# Patient Record
Sex: FEMALE | Race: BLACK OR AFRICAN AMERICAN | NOT HISPANIC OR LATINO | Employment: FULL TIME | URBAN - METROPOLITAN AREA
[De-identification: names, ages, dates, MRNs, and addresses within clinical notes are randomized per-mention and may not be internally consistent; named-entity substitution may affect disease eponyms.]

---

## 2020-01-17 ENCOUNTER — TELEPHONE (OUTPATIENT)
Dept: ENDOCRINOLOGY | Facility: CLINIC | Age: 55
End: 2020-01-17

## 2020-01-17 NOTE — TELEPHONE ENCOUNTER
----- Message from Janki Souza LPN sent at 1/17/2020  3:06 PM CST -----  Contact: Self  Message was sent to Dr. Strange's staff but pt is specifically asking for [place].  Please call her to schedule.    ----- Message -----  From: Miladis Jack  Sent: 1/17/2020   2:27 PM CST  To: Alexandr Powell Staff (Endo)    Pt is calling to be scheduled for an appt for possible diabetes & underactive thyroid; however,  was unable to find availability due to an exhausted template.    She can be reached at [phone].    Thank you.

## 2020-09-30 PROBLEM — S52.021A CLOSED FRACTURE OF RIGHT OLECRANON PROCESS: Status: RESOLVED | Noted: 2020-09-30 | Resolved: 2020-09-30

## 2020-09-30 PROBLEM — S52.021A CLOSED FRACTURE OF RIGHT OLECRANON PROCESS: Status: ACTIVE | Noted: 2020-09-30

## 2021-03-05 ENCOUNTER — IMMUNIZATION (OUTPATIENT)
Dept: PRIMARY CARE CLINIC | Facility: CLINIC | Age: 56
End: 2021-03-05
Payer: COMMERCIAL

## 2021-03-05 DIAGNOSIS — Z23 NEED FOR VACCINATION: Primary | ICD-10-CM

## 2021-03-05 PROCEDURE — 0031A PR IMMUNIZ ADMIN, SARS-COV-2 COVID-19 VACC, 5X10VP/0.5ML: ICD-10-PCS | Mod: CV19,S$GLB,, | Performed by: INTERNAL MEDICINE

## 2021-03-05 PROCEDURE — 91303 PR SARSCOV2 VAC AD26 .5ML IM: CPT | Mod: S$GLB,,, | Performed by: INTERNAL MEDICINE

## 2021-03-05 PROCEDURE — 91303 PR SARSCOV2 VAC AD26 .5ML IM: ICD-10-PCS | Mod: S$GLB,,, | Performed by: INTERNAL MEDICINE

## 2021-03-05 PROCEDURE — 0031A PR IMMUNIZ ADMIN, SARS-COV-2 COVID-19 VACC, 5X10VP/0.5ML: CPT | Mod: CV19,S$GLB,, | Performed by: INTERNAL MEDICINE

## 2021-05-07 ENCOUNTER — LAB VISIT (OUTPATIENT)
Dept: INTERNAL MEDICINE | Facility: CLINIC | Age: 56
End: 2021-05-07
Payer: COMMERCIAL

## 2021-05-07 DIAGNOSIS — Z01.818 PRE-OP TESTING: ICD-10-CM

## 2021-05-07 LAB — SARS-COV-2 RNA RESP QL NAA+PROBE: NOT DETECTED

## 2021-05-07 PROCEDURE — U0003 INFECTIOUS AGENT DETECTION BY NUCLEIC ACID (DNA OR RNA); SEVERE ACUTE RESPIRATORY SYNDROME CORONAVIRUS 2 (SARS-COV-2) (CORONAVIRUS DISEASE [COVID-19]), AMPLIFIED PROBE TECHNIQUE, MAKING USE OF HIGH THROUGHPUT TECHNOLOGIES AS DESCRIBED BY CMS-2020-01-R: HCPCS | Performed by: ORTHOPAEDIC SURGERY

## 2021-05-07 PROCEDURE — U0005 INFEC AGEN DETEC AMPLI PROBE: HCPCS | Performed by: ORTHOPAEDIC SURGERY

## 2021-05-10 PROBLEM — Z01.818 PRE-OP TESTING: Status: ACTIVE | Noted: 2021-05-10

## 2021-05-10 PROBLEM — S52.021D: Status: ACTIVE | Noted: 2020-09-30

## 2021-05-10 PROBLEM — S52.021D: Status: RESOLVED | Noted: 2020-09-30 | Resolved: 2021-05-10

## 2022-04-14 ENCOUNTER — OFFICE VISIT (OUTPATIENT)
Dept: ENDOCRINOLOGY | Facility: CLINIC | Age: 57
End: 2022-04-14
Payer: COMMERCIAL

## 2022-04-14 VITALS
TEMPERATURE: 99 F | WEIGHT: 186 LBS | BODY MASS INDEX: 35.14 KG/M2 | SYSTOLIC BLOOD PRESSURE: 163 MMHG | HEART RATE: 73 BPM | DIASTOLIC BLOOD PRESSURE: 96 MMHG

## 2022-04-14 DIAGNOSIS — I10 HYPERTENSION, UNSPECIFIED TYPE: ICD-10-CM

## 2022-04-14 DIAGNOSIS — E11.9 CONTROLLED TYPE 2 DIABETES MELLITUS WITHOUT COMPLICATION, WITHOUT LONG-TERM CURRENT USE OF INSULIN: Primary | ICD-10-CM

## 2022-04-14 DIAGNOSIS — E04.9 GOITER: ICD-10-CM

## 2022-04-14 PROCEDURE — 1159F MED LIST DOCD IN RCRD: CPT | Mod: CPTII,S$GLB,, | Performed by: HOSPITALIST

## 2022-04-14 PROCEDURE — 3077F SYST BP >= 140 MM HG: CPT | Mod: CPTII,S$GLB,, | Performed by: HOSPITALIST

## 2022-04-14 PROCEDURE — 4010F PR ACE/ARB THEARPY RXD/TAKEN: ICD-10-PCS | Mod: CPTII,S$GLB,, | Performed by: HOSPITALIST

## 2022-04-14 PROCEDURE — 4010F ACE/ARB THERAPY RXD/TAKEN: CPT | Mod: CPTII,S$GLB,, | Performed by: HOSPITALIST

## 2022-04-14 PROCEDURE — 3077F PR MOST RECENT SYSTOLIC BLOOD PRESSURE >= 140 MM HG: ICD-10-PCS | Mod: CPTII,S$GLB,, | Performed by: HOSPITALIST

## 2022-04-14 PROCEDURE — 99204 OFFICE O/P NEW MOD 45 MIN: CPT | Mod: S$GLB,,, | Performed by: HOSPITALIST

## 2022-04-14 PROCEDURE — 3008F PR BODY MASS INDEX (BMI) DOCUMENTED: ICD-10-PCS | Mod: CPTII,S$GLB,, | Performed by: HOSPITALIST

## 2022-04-14 PROCEDURE — 3080F DIAST BP >= 90 MM HG: CPT | Mod: CPTII,S$GLB,, | Performed by: HOSPITALIST

## 2022-04-14 PROCEDURE — 3080F PR MOST RECENT DIASTOLIC BLOOD PRESSURE >= 90 MM HG: ICD-10-PCS | Mod: CPTII,S$GLB,, | Performed by: HOSPITALIST

## 2022-04-14 PROCEDURE — 99999 PR PBB SHADOW E&M-EST. PATIENT-LVL V: ICD-10-PCS | Mod: PBBFAC,,, | Performed by: HOSPITALIST

## 2022-04-14 PROCEDURE — 99204 PR OFFICE/OUTPT VISIT, NEW, LEVL IV, 45-59 MIN: ICD-10-PCS | Mod: S$GLB,,, | Performed by: HOSPITALIST

## 2022-04-14 PROCEDURE — 1160F RVW MEDS BY RX/DR IN RCRD: CPT | Mod: CPTII,S$GLB,, | Performed by: HOSPITALIST

## 2022-04-14 PROCEDURE — 1160F PR REVIEW ALL MEDS BY PRESCRIBER/CLIN PHARMACIST DOCUMENTED: ICD-10-PCS | Mod: CPTII,S$GLB,, | Performed by: HOSPITALIST

## 2022-04-14 PROCEDURE — 1159F PR MEDICATION LIST DOCUMENTED IN MEDICAL RECORD: ICD-10-PCS | Mod: CPTII,S$GLB,, | Performed by: HOSPITALIST

## 2022-04-14 PROCEDURE — 99999 PR PBB SHADOW E&M-EST. PATIENT-LVL V: CPT | Mod: PBBFAC,,, | Performed by: HOSPITALIST

## 2022-04-14 PROCEDURE — 3008F BODY MASS INDEX DOCD: CPT | Mod: CPTII,S$GLB,, | Performed by: HOSPITALIST

## 2022-04-14 RX ORDER — METFORMIN HYDROCHLORIDE 500 MG/1
500 TABLET, EXTENDED RELEASE ORAL DAILY
COMMUNITY
Start: 2022-02-17 | End: 2022-04-14 | Stop reason: SDUPTHER

## 2022-04-14 RX ORDER — FUROSEMIDE 40 MG/1
40 TABLET ORAL DAILY
COMMUNITY
Start: 2022-01-25 | End: 2022-10-06 | Stop reason: SDUPTHER

## 2022-04-14 RX ORDER — INSULIN PUMP SYRINGE, 3 ML
EACH MISCELLANEOUS
Qty: 1 EACH | Refills: 0 | Status: SHIPPED | OUTPATIENT
Start: 2022-04-14

## 2022-04-14 RX ORDER — LOSARTAN POTASSIUM 50 MG/1
50 TABLET ORAL DAILY
Status: ON HOLD | COMMUNITY
Start: 2021-11-16 | End: 2022-10-07 | Stop reason: HOSPADM

## 2022-04-14 RX ORDER — LANCETS
EACH MISCELLANEOUS
Qty: 50 EACH | Refills: 11 | Status: SHIPPED | OUTPATIENT
Start: 2022-04-14

## 2022-04-14 RX ORDER — METFORMIN HYDROCHLORIDE 500 MG/1
500 TABLET, EXTENDED RELEASE ORAL DAILY
Qty: 90 TABLET | Refills: 3 | Status: SHIPPED | OUTPATIENT
Start: 2022-04-14 | End: 2022-06-03 | Stop reason: SDUPTHER

## 2022-04-14 RX ORDER — LANCING DEVICE
EACH MISCELLANEOUS
Qty: 1 EACH | Refills: 0 | Status: SHIPPED | OUTPATIENT
Start: 2022-04-14

## 2022-04-14 NOTE — ASSESSMENT & PLAN NOTE
- lack of information, does limit diabetes care, reportedly diabetes to be better control with A1c of 6%, Goal A1C for patient is 7%  - Limit data/lack of glucose log, making adjustment of diabetes regiment very difficult  - Diabetic supplies/medications reviewed this visit to ensure continue refills and compliance  - Reviewed routine maintenance: lipid, U:P/C     Plan  - due to the lack of information, we will continue metformin daily  - we will check formal A1c  - Advised pt to check glucoses regularly, asked to filled glucose log and bring back for review at next office visit>> glucometer and supplies sent to pharmacy  - Follow up as scheduled with lab work prior  - can consider GLP1 given history of heart disease and obesity

## 2022-04-14 NOTE — ASSESSMENT & PLAN NOTE
- patient reports history of thyroid goiter with right thyroidectomy  - reported compressive symptoms at that time.    - Given the lack of information, we will pursue formal thyroid ultrasound  - check TFTs, check antibodies

## 2022-04-14 NOTE — PROGRESS NOTES
Subjective:      Patient ID: Olga Lopez is a 56 y.o. female presented to [facility] on 4/14/2022.  Chief Complaint:  Diabetes      History of Present Illness: Olga Lopez is a 56 y.o. female here for type 2 diabetes   Other significant past medical history:  Goiter, obesity, hypertension, heart disease    With regards to Diabetes Mellitus Type 2  Known diabetic complications: peripheral neuropathy  Diagnosed w/ DM: unknown    Interval history: Transfer of care, from outside primary care doctor.  Reportedly over the last few months her A1c, A1C was 10>>> improved to 6.7% on last A1C, eating better leading to this.  Reportedly currently only taking metformin 1 pill daily.  Does endorse a history of thyroid goiter with partial right thyroidectomy.  Many years ago.  Patient request cardiology referral for assistant with blood pressure as well as heart disease      Current meds:               Metformin XR 500mg once a day  Previous meds: n/a    Home glucose checks: not checking it  Diet/Exercise:               Eating 2-3x meals per day, skip lunch              Drink: sugar free drink  Weight trend: increasing steadily  Diabetes Education: No  Hx of pancreatitis, hx of thyroid cancer: No  Family history of diabetes: no  Occupation: retired    Eye exam current (within one year): yes, DR: no, 6 months ago  Reports cuts or ulcers on feet:   Denies    Statin: Taking  ACE/ARB: Taking    Diabetes Management Status: Reviewed     A1C Trend  No results found for: HGBA1C    No results found for: MICALBCREAT  No results found for: [id number]  No results found for: TTGIGA    No results found for: CPEPTIDE, GLUTAMICACID, ISLETCELLANT, FRUCTOSAMINE     Screening or Prevention Patient's value Goal Complete/Controlled?   Lipid profile Most Recent Lipid Panel Health Maintenance Topic Completion: Not Found Annually No   LDL control No results found for: LDLCALC Annually/Less than 100 mg/dl  No  "  Nephropathy screening No results found for: LABMICR  Lab Results   Component Value Date    PROTEINUA Trace (A) 04/26/2021    Annually Yes   Blood pressure BP Readings from Last 1 Encounters:   04/14/22 (!) 163/96    Less than 140/90 No   Dilated retinal exam Most Recent Eye Exam Date: Not Found Annually Yes   Foot exam   Most Recent Foot Exam Date: Not Found Annually Yes       Thyroid nodule/Goiter  Had surgery 2/2020>> had surgery for ?R thyroidectomy  +choking sensation>> leading to surgery    Denies Thyroid Ca  No graves, no RASHID, no Methimazole   No family hx of thyroid problem    No medication for thyroid    Reported "3 nodules" on the left side    No results found for: TSH    Reviewed past surgical, medical, family, social history and updated as appropriate.    Review of Systems: see HPI above    Objective:   BP (!) 163/96 (BP Location: Left arm)   Pulse 73   Temp 98.5 °F (36.9 °C) (Oral)   Wt 84.4 kg (186 lb)   BMI 35.14 kg/m²     Body mass index is 35.14 kg/m².  Vital signs reviewed    Physical Exam  Vitals and nursing note reviewed.   Constitutional:       General: She is not in acute distress.     Appearance: Normal appearance. She is well-developed. She is obese. She is not toxic-appearing.   Neck:      Thyroid: No thyromegaly.      Comments: Large neck, difficult to evaluate thyroid gland, small surgical scar noted  Cardiovascular:      Heart sounds: Normal heart sounds.   Pulmonary:      Effort: Pulmonary effort is normal. No respiratory distress.   Abdominal:      Tenderness: There is no abdominal tenderness.   Musculoskeletal:         General: No deformity. Normal range of motion.      Cervical back: Normal range of motion.   Skin:     Findings: No bruising.   Neurological:      Mental Status: She is alert and oriented to person, place, and time.   Psychiatric:         Mood and Affect: Mood normal.         Lab Reviewed:   No results found for: HGBA1C    No results found for: CHOL, HDL, LDLCALC, " TRIG, CHOLHDL    Lab Results   Component Value Date     04/26/2021    K 2.8 (L) 04/26/2021    CL 98 04/26/2021    CO2 33 (H) 04/26/2021    GLU 86 04/26/2021    BUN 6 04/26/2021    CREATININE 0.8 04/26/2021    CALCIUM 9.3 04/26/2021    PROT 9.1 (H) 04/26/2021    ALBUMIN 3.6 04/26/2021    BILITOT 0.3 04/26/2021    ALKPHOS 95 04/26/2021    AST 17 04/26/2021    ALT 17 04/26/2021    ANIONGAP 9 04/26/2021    ESTGFRAFRICA >60.0 04/26/2021    EGFRNONAA >60.0 04/26/2021        Lab Results   Component Value Date    CALCIUM 9.3 04/26/2021    CALCIUM 8.8 09/28/2020    ALKPHOS 95 04/26/2021    ALKPHOS 94 09/28/2020       Assessment     1. Controlled type 2 diabetes mellitus without complication, without long-term current use of insulin  Hemoglobin A1C    Comprehensive Metabolic Panel    lancing device Misc    lancets Misc    blood-glucose meter kit    blood sugar diagnostic Strp    metFORMIN (GLUCOPHAGE-XR) 500 MG ER 24hr tablet   2. Goiter  TSH    T4, Free    US Soft Tissue Head Neck Thyroid    Thyroid Peroxidase Antibody   3. Hypertension, unspecified type  Ambulatory referral/consult to Cardiology        Plan     Controlled type 2 diabetes mellitus without complication, without long-term current use of insulin  - lack of information, does limit diabetes care, reportedly diabetes to be better control with A1c of 6%, Goal A1C for patient is 7%  - Limit data/lack of glucose log, making adjustment of diabetes regiment very difficult  - Diabetic supplies/medications reviewed this visit to ensure continue refills and compliance  - Reviewed routine maintenance: lipid, U:P/C     Plan  - due to the lack of information, we will continue metformin daily  - we will check formal A1c  - Advised pt to check glucoses regularly, asked to filled glucose log and bring back for review at next office visit>> glucometer and supplies sent to pharmacy  - Follow up as scheduled with lab work prior  - can consider GLP1 given history of heart  disease and obesity    Goiter  - patient reports history of thyroid goiter with right thyroidectomy  - reported compressive symptoms at that time.    - Given the lack of information, we will pursue formal thyroid ultrasound  - check TFTs, check antibodies    Hypertension  - blood pressure not control, patient requests referral for Cardiology, order placed    Advised patient to follow up with PCP for routine health maintenance care.   RTC in 6 weeks to review lab work, ultrasound      Magno Wiggins M.D.  Endocrinology  [facility]  4/14/2022      Disclaimer: This note has been generated in part with the use of voice-recognition software. There may be typographical errors that have been missed during proof-reading.

## 2022-05-20 ENCOUNTER — HOSPITAL ENCOUNTER (OUTPATIENT)
Dept: RADIOLOGY | Facility: HOSPITAL | Age: 57
Discharge: HOME | End: 2022-05-20
Attending: HOSPITALIST
Payer: COMMERCIAL

## 2022-05-20 DIAGNOSIS — E04.9 GOITER: ICD-10-CM

## 2022-05-20 PROCEDURE — 76536 US EXAM OF HEAD AND NECK: CPT | Mod: 26,,, | Performed by: STUDENT IN AN ORGANIZED HEALTH CARE EDUCATION/TRAINING PROGRAM

## 2022-05-20 PROCEDURE — 76536 US SOFT TISSUE HEAD NECK THYROID: ICD-10-PCS | Mod: 26,,, | Performed by: STUDENT IN AN ORGANIZED HEALTH CARE EDUCATION/TRAINING PROGRAM

## 2022-05-20 PROCEDURE — 76536 US EXAM OF HEAD AND NECK: CPT | Mod: TC

## 2022-06-03 ENCOUNTER — OFFICE VISIT (OUTPATIENT)
Dept: ENDOCRINOLOGY | Facility: CLINIC | Age: 57
End: 2022-06-03
Payer: COMMERCIAL

## 2022-06-03 VITALS
TEMPERATURE: 98 F | SYSTOLIC BLOOD PRESSURE: 164 MMHG | BODY MASS INDEX: 35.96 KG/M2 | DIASTOLIC BLOOD PRESSURE: 90 MMHG | HEART RATE: 73 BPM | WEIGHT: 190.31 LBS

## 2022-06-03 DIAGNOSIS — E66.9 OBESITY, UNSPECIFIED CLASSIFICATION, UNSPECIFIED OBESITY TYPE, UNSPECIFIED WHETHER SERIOUS COMORBIDITY PRESENT: ICD-10-CM

## 2022-06-03 DIAGNOSIS — M21.612 BUNION OF LEFT FOOT: ICD-10-CM

## 2022-06-03 DIAGNOSIS — I10 PRIMARY HYPERTENSION: ICD-10-CM

## 2022-06-03 DIAGNOSIS — E04.9 GOITER: ICD-10-CM

## 2022-06-03 DIAGNOSIS — E89.0 POSTOPERATIVE HYPOTHYROIDISM: ICD-10-CM

## 2022-06-03 DIAGNOSIS — E11.9 CONTROLLED TYPE 2 DIABETES MELLITUS WITHOUT COMPLICATION, WITHOUT LONG-TERM CURRENT USE OF INSULIN: Primary | ICD-10-CM

## 2022-06-03 PROCEDURE — 1159F PR MEDICATION LIST DOCUMENTED IN MEDICAL RECORD: ICD-10-PCS | Mod: CPTII,S$GLB,, | Performed by: HOSPITALIST

## 2022-06-03 PROCEDURE — 4010F ACE/ARB THERAPY RXD/TAKEN: CPT | Mod: CPTII,S$GLB,, | Performed by: HOSPITALIST

## 2022-06-03 PROCEDURE — 99214 OFFICE O/P EST MOD 30 MIN: CPT | Mod: S$GLB,,, | Performed by: HOSPITALIST

## 2022-06-03 PROCEDURE — 99999 PR PBB SHADOW E&M-EST. PATIENT-LVL V: ICD-10-PCS | Mod: PBBFAC,,, | Performed by: HOSPITALIST

## 2022-06-03 PROCEDURE — 4010F PR ACE/ARB THEARPY RXD/TAKEN: ICD-10-PCS | Mod: CPTII,S$GLB,, | Performed by: HOSPITALIST

## 2022-06-03 PROCEDURE — 3008F PR BODY MASS INDEX (BMI) DOCUMENTED: ICD-10-PCS | Mod: CPTII,S$GLB,, | Performed by: HOSPITALIST

## 2022-06-03 PROCEDURE — 1160F RVW MEDS BY RX/DR IN RCRD: CPT | Mod: CPTII,S$GLB,, | Performed by: HOSPITALIST

## 2022-06-03 PROCEDURE — 1160F PR REVIEW ALL MEDS BY PRESCRIBER/CLIN PHARMACIST DOCUMENTED: ICD-10-PCS | Mod: CPTII,S$GLB,, | Performed by: HOSPITALIST

## 2022-06-03 PROCEDURE — 3044F HG A1C LEVEL LT 7.0%: CPT | Mod: CPTII,S$GLB,, | Performed by: HOSPITALIST

## 2022-06-03 PROCEDURE — 3044F PR MOST RECENT HEMOGLOBIN A1C LEVEL <7.0%: ICD-10-PCS | Mod: CPTII,S$GLB,, | Performed by: HOSPITALIST

## 2022-06-03 PROCEDURE — 99214 PR OFFICE/OUTPT VISIT, EST, LEVL IV, 30-39 MIN: ICD-10-PCS | Mod: S$GLB,,, | Performed by: HOSPITALIST

## 2022-06-03 PROCEDURE — 1159F MED LIST DOCD IN RCRD: CPT | Mod: CPTII,S$GLB,, | Performed by: HOSPITALIST

## 2022-06-03 PROCEDURE — 3080F PR MOST RECENT DIASTOLIC BLOOD PRESSURE >= 90 MM HG: ICD-10-PCS | Mod: CPTII,S$GLB,, | Performed by: HOSPITALIST

## 2022-06-03 PROCEDURE — 3080F DIAST BP >= 90 MM HG: CPT | Mod: CPTII,S$GLB,, | Performed by: HOSPITALIST

## 2022-06-03 PROCEDURE — 99999 PR PBB SHADOW E&M-EST. PATIENT-LVL V: CPT | Mod: PBBFAC,,, | Performed by: HOSPITALIST

## 2022-06-03 PROCEDURE — 3077F SYST BP >= 140 MM HG: CPT | Mod: CPTII,S$GLB,, | Performed by: HOSPITALIST

## 2022-06-03 PROCEDURE — 3008F BODY MASS INDEX DOCD: CPT | Mod: CPTII,S$GLB,, | Performed by: HOSPITALIST

## 2022-06-03 PROCEDURE — 3077F PR MOST RECENT SYSTOLIC BLOOD PRESSURE >= 140 MM HG: ICD-10-PCS | Mod: CPTII,S$GLB,, | Performed by: HOSPITALIST

## 2022-06-03 RX ORDER — LEVOTHYROXINE SODIUM 50 UG/1
50 TABLET ORAL
Qty: 90 TABLET | Refills: 0 | Status: SHIPPED | OUTPATIENT
Start: 2022-06-03 | End: 2022-09-12

## 2022-06-03 RX ORDER — METFORMIN HYDROCHLORIDE 500 MG/1
500 TABLET, EXTENDED RELEASE ORAL 2 TIMES DAILY WITH MEALS
Qty: 180 TABLET | Refills: 0 | Status: SHIPPED | OUTPATIENT
Start: 2022-06-03 | End: 2022-11-11 | Stop reason: SDUPTHER

## 2022-06-03 NOTE — ASSESSMENT & PLAN NOTE
- mild hypothyroidism, elevation TSH 4.1, patient reports symptoms of weight gain  - trial of levothyroxine 50 mcg daily, proper administration direction discuss  - repeat lab work in 2 months

## 2022-06-03 NOTE — ASSESSMENT & PLAN NOTE
- blood pressure not control  - patient requests referral for Cardiology  - order placed, phone number given for patient to call

## 2022-06-03 NOTE — ASSESSMENT & PLAN NOTE
- patient reports history of thyroid goiter with right thyroidectomy  - ultrasound thyroid review:  2022:  Right absent thyroid gland, subcentimeter left thyroid nodules  - TSH elevation  - no further workup needed for thyroid ultrasound

## 2022-06-03 NOTE — PATIENT INSTRUCTIONS
Metformin 500mg twice a day      Start levothyroxine 50 mcg daily  Please take as a single dose, on an empty stomach with glass of water, one-half to one hour before breakfast.      Check glucose daily  Goal blood sugar in the morning, before breakfast:   Glucose goal AFTER MEALS:    2 Hour after: less than 140  Before going to bed: 100-140  Do not go to bed with glucose less than 100  Have a small snack if glucose is lower than 100

## 2022-06-03 NOTE — PROGRESS NOTES
Subjective:      Patient ID: Olga Lopez is a 56 y.o. female presented to [facility] on 6/3/2022.  Chief Complaint:  Diabetes      History of Present Illness: Olga Lopez is a 56 y.o. female here for type 2 diabetes   Other significant past medical history:  Goiter, obesity, hypertension, heart disease, COPD/Asthma      1) With regards to Diabetes Mellitus Type 2  Known diabetic complications: peripheral neuropathy  Diagnosed w/ DM: 2019?>> worsening to A1C 10%  Transfer of care, from outside primary care doctor.  Reportedly over the last few months her A1c, A1C was 10>>> improved to 6.7% on last A1C,      Interval history:  Patient continue to report good dietary modification.  Requesting additional help with nutrition, not seen diabetic educator  Currently compliant with metformin 500 mg XR daily.  She is concerned about continue weight gain  history of thyroid goiter with partial right thyroidectom  Patient request cardiology referral for assistant with blood pressure as well as heart disease    Glucose data oral recall  128, 103, some 140s  70s    Current meds:               Metformin XR 500mg once a day  Previous meds: n/a    Home glucose checks: not checking it  Diet/Exercise:               Eating 2-3x meals per day, skip lunch              Drink: sugar free drink  Weight trend: increasing steadily  Diabetes Education: No  Hx of pancreatitis: No  Family history of diabetes: no  Occupation: retired    Eye exam current (within one year): yes, DR: no, 6 months ago  Reports cuts or ulcers on feet:   Denies    Statin: Taking  ACE/ARB: Taking    Diabetes Management Status: Reviewed     A1C Trend  Lab Results   Component Value Date    HGBA1C 6.3 (H) 05/20/2022       No results found for: MICALBCREAT  No results found for: SDGBRHPD15  No results found for: TTGIGA    No results found for: CPEPTIDE, GLUTAMICACID, ISLETCELLANT, FRUCTOSAMINE     Screening or Prevention Patient's  "value Goal Complete/Controlled?   Lipid profile Most Recent Lipid Panel Health Maintenance Topic Completion: Not Found Annually No   LDL control No results found for: LDLCALC Annually/Less than 100 mg/dl  No   Nephropathy screening No results found for: LABMICR  Lab Results   Component Value Date    PROTEINUA Trace (A) 04/26/2021    Annually Yes   Blood pressure BP Readings from Last 1 Encounters:   06/03/22 (!) 164/90    Less than 140/90 No   Dilated retinal exam : 05/27/2022 Annually Yes   Foot exam   : 06/03/2022 Annually Yes         2) Subclinical hypothyroidism   Elevated TSH 4.1    Lab Results   Component Value Date    TSH 4.160 (H) 05/20/2022     Thyroid nodule/Goiter  Had surgery 2/2020>> had surgery for ?R thyroidectomy  +choking sensation>> leading to surgery    Denies Thyroid Ca  No graves, no RASHID, no Methimazole   No family hx of thyroid problem  No medication for thyroid    Reported "3 nodules" on the left side    FINDINGS:  The right lobe of the thyroid is surgically absent.     The thyroid isthmus measures 0.5 cm.     US thyroid 2022  The left lobe of the thyroid measures 3.7 x 1.4 x 1.5 cm.  Two subcentimeter left lobe nodules which do not meet criteria for FNA or surveillance.  Thyroid vascularity is within normal limits.  There are no abnormal lymph nodes within the visualized portions of the neck.     Impression:  1. Two subcentimeter left thyroid lobe nodules.  None meet criteria for FNA or surveillance.  2. Status post right hemithyroidectomy.      Lab Results   Component Value Date    TSH 4.160 (H) 05/20/2022       Reviewed past surgical, medical, family, social history and updated as appropriate.    Review of Systems: see HPI above    Objective:   BP (!) 164/90 (BP Location: Right arm)   Pulse 73   Temp 98.3 °F (36.8 °C) (Oral)   Wt 86.3 kg (190 lb 4.8 oz)   BMI 35.96 kg/m²     Body mass index is 35.96 kg/m².  Vital signs reviewed    Physical Exam  Vitals and nursing note reviewed. "   Constitutional:       General: She is not in acute distress.     Appearance: Normal appearance. She is well-developed. She is obese. She is not toxic-appearing.   Neck:      Thyroid: No thyromegaly.      Comments: Large neck, difficult to evaluate thyroid gland, small surgical scar noted  Cardiovascular:      Pulses:           Dorsalis pedis pulses are 1+ on the right side and 1+ on the left side.   Pulmonary:      Effort: Pulmonary effort is normal. No respiratory distress.   Musculoskeletal:         General: No deformity. Normal range of motion.      Cervical back: Normal range of motion.      Right lower leg: No edema.      Left lower leg: No edema.      Right foot: Normal range of motion. No deformity or bunion.      Left foot: Normal range of motion. No deformity or bunion.        Feet:    Feet:      Right foot:      Protective Sensation: 5 sites tested. 5 sites sensed.      Skin integrity: Skin integrity normal.      Toenail Condition: Right toenails are normal.      Left foot:      Protective Sensation: 5 sites tested. 5 sites sensed.      Skin integrity: Skin integrity normal.      Toenail Condition: Left toenails are normal.   Skin:     Findings: No bruising.   Neurological:      Mental Status: She is alert and oriented to person, place, and time.   Psychiatric:         Mood and Affect: Mood normal.         Lab Reviewed:   Lab Results   Component Value Date    HGBA1C 6.3 (H) 05/20/2022       No results found for: CHOL, HDL, LDLCALC, TRIG, CHOLHDL    Lab Results   Component Value Date     05/20/2022    K 3.2 (L) 05/20/2022     05/20/2022    CO2 32 (H) 05/20/2022     05/20/2022    BUN 9 05/20/2022    CREATININE 0.7 05/20/2022    CALCIUM 9.4 05/20/2022    PROT 8.2 05/20/2022    ALBUMIN 3.5 05/20/2022    BILITOT 0.3 05/20/2022    ALKPHOS 83 05/20/2022    AST 17 05/20/2022    ALT 21 05/20/2022    ANIONGAP 9 05/20/2022    ESTGFRAFRICA >60.0 05/20/2022    EGFRNONAA >60.0 05/20/2022    TSH  4.160 (H) 05/20/2022        Lab Results   Component Value Date    CALCIUM 9.4 05/20/2022    CALCIUM 9.3 04/26/2021    CALCIUM 8.8 09/28/2020    ALKPHOS 83 05/20/2022    ALKPHOS 95 04/26/2021    ALKPHOS 94 09/28/2020    TSH 4.160 (H) 05/20/2022       Assessment     1. Controlled type 2 diabetes mellitus without complication, without long-term current use of insulin  Ambulatory referral/consult to Diabetes Education    metFORMIN (GLUCOPHAGE-XR) 500 MG ER 24hr tablet    Hemoglobin A1C    Basic Metabolic Panel   2. Goiter  TSH    T4, Free    Ambulatory referral/consult to Podiatry   3. Primary hypertension     4. Postoperative hypothyroidism  levothyroxine (SYNTHROID) 50 MCG tablet    TSH    T4, Free   5. Bunion of left foot     6. Obesity, unspecified classification, unspecified obesity type, unspecified whether serious comorbidity present  Vitamin D        Plan     Controlled type 2 diabetes mellitus without complication, without long-term current use of insulin  - Diabetes is at goal, given most current A1C, Goal A1C for patient is <7%  - Diabetic supplies/medications reviewed this visit to ensure continue refills and compliance  - Advised patient to get routine feet care, routine eye exam  - encourage patient to continue good diabetes control    Plan  - patient expressed desire for diabetes control as well as weight loss:  Offer Trulicity/Ozempic, she declined at this time, she wants to spend more time working on dietary modification  - Diabetes Education referral placed for nutrition  - Advised pt to check glucoses regularly, asked to filled glucose log and bring back for review at next office visit  - follow-up per patient request in 2 months for re-evaluation, will consider initiation therapy at that time    Goiter  - patient reports history of thyroid goiter with right thyroidectomy  - ultrasound thyroid review:  2022:  Right absent thyroid gland, subcentimeter left thyroid nodules  - TSH elevation  - no further  workup needed for thyroid ultrasound    Hypertension  - blood pressure not control  - patient requests referral for Cardiology  - order placed, phone number given for patient to call    Postoperative hypothyroidism  - mild hypothyroidism, elevation TSH 4.1, patient reports symptoms of weight gain  - trial of levothyroxine 50 mcg daily, proper administration direction discuss  - repeat lab work in 2 months      Advised patient to follow up with PCP for routine health maintenance care.   RTC in 6 weeks to review lab work, ultrasound      Magno Wiggins M.D.  Endocrinology  [facility]  6/3/2022      Disclaimer: This note has been generated in part with the use of voice-recognition software. There may be typographical errors that have been missed during proof-reading.

## 2022-06-03 NOTE — ASSESSMENT & PLAN NOTE
- Diabetes is at goal, given most current A1C, Goal A1C for patient is <7%  - Diabetic supplies/medications reviewed this visit to ensure continue refills and compliance  - Advised patient to get routine feet care, routine eye exam  - encourage patient to continue good diabetes control    Plan  - patient expressed desire for diabetes control as well as weight loss:  Offer Trulicity/Ozempic, she declined at this time, she wants to spend more time working on dietary modification  - Diabetes Education referral placed for nutrition  - Advised pt to check glucoses regularly, asked to filled glucose log and bring back for review at next office visit  - follow-up per patient request in 2 months for re-evaluation, will consider initiation therapy at that time

## 2022-06-30 ENCOUNTER — HOSPITAL ENCOUNTER (EMERGENCY)
Facility: HOSPITAL | Age: 57
Discharge: HOME | End: 2022-07-01
Attending: EMERGENCY MEDICINE
Payer: COMMERCIAL

## 2022-06-30 VITALS
RESPIRATION RATE: 20 BRPM | BODY MASS INDEX: 35.12 KG/M2 | WEIGHT: 186 LBS | HEIGHT: 61 IN | DIASTOLIC BLOOD PRESSURE: 116 MMHG | OXYGEN SATURATION: 98 % | TEMPERATURE: 98 F | HEART RATE: 100 BPM | SYSTOLIC BLOOD PRESSURE: 186 MMHG

## 2022-06-30 DIAGNOSIS — M25.511 RIGHT SHOULDER PAIN: ICD-10-CM

## 2022-06-30 DIAGNOSIS — Z01.811 PRE-OP CHEST EXAM: ICD-10-CM

## 2022-06-30 DIAGNOSIS — S42.291A OTHER CLOSED DISPLACED FRACTURE OF PROXIMAL END OF RIGHT HUMERUS, INITIAL ENCOUNTER: ICD-10-CM

## 2022-06-30 DIAGNOSIS — S00.83XA TRAUMATIC HEMATOMA OF FOREHEAD, INITIAL ENCOUNTER: ICD-10-CM
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
## 2022-06-30 PROBLEM — S42.301A FRACTURE OF HUMERAL SHAFT, RIGHT, CLOSED: Status: ACTIVE | Noted: 2022-06-30

## 2022-06-30 LAB
ABO + RH BLD: NORMAL
ALBUMIN SERPL BCP-MCNC: 3.6 G/DL (ref 3.5–5.2)
ALP SERPL-CCNC: 100 U/L (ref 55–135)
ALT SERPL W/O P-5'-P-CCNC: 32 U/L (ref 10–44)
ANION GAP SERPL CALC-SCNC: 9 MMOL/L (ref 8–16)
AST SERPL-CCNC: 26 U/L (ref 10–40)
BASOPHILS # BLD AUTO: 0.06 K/UL (ref 0–0.2)
BASOPHILS NFR BLD: 0.5 % (ref 0–1.9)
BILIRUB SERPL-MCNC: 0.3 MG/DL (ref 0.1–1)
BLD GP AB SCN CELLS X3 SERPL QL: NORMAL
BUN SERPL-MCNC: 10 MG/DL (ref 6–20)
CALCIUM SERPL-MCNC: 9.7 MG/DL (ref 8.7–10.5)
CHLORIDE SERPL-SCNC: 104 MMOL/L (ref 95–110)
CO2 SERPL-SCNC: 28 MMOL/L (ref 23–29)
CREAT SERPL-MCNC: 0.7 MG/DL (ref 0.5–1.4)
DIFFERENTIAL METHOD: ABNORMAL
EOSINOPHIL # BLD AUTO: 0 K/UL (ref 0–0.5)
EOSINOPHIL NFR BLD: 0.2 % (ref 0–8)
ERYTHROCYTE [DISTWIDTH] IN BLOOD BY AUTOMATED COUNT: 14.5 % (ref 11.5–14.5)
EST. GFR  (AFRICAN AMERICAN): >60 ML/MIN/1.73 M^2
EST. GFR  (NON AFRICAN AMERICAN): >60 ML/MIN/1.73 M^2
ESTIMATED AVG GLUCOSE: 140 MG/DL (ref 68–131)
GLUCOSE SERPL-MCNC: 145 MG/DL (ref 70–110)
HBA1C MFR BLD: 6.5 % (ref 4–5.6)
HCT VFR BLD AUTO: 40.6 % (ref 37–48.5)
HGB BLD-MCNC: 12.5 G/DL (ref 12–16)
IMM GRANULOCYTES # BLD AUTO: 0.06 K/UL (ref 0–0.04)
IMM GRANULOCYTES NFR BLD AUTO: 0.5 % (ref 0–0.5)
INR PPP: 1.1 (ref 0.8–1.2)
LYMPHOCYTES # BLD AUTO: 2.2 K/UL (ref 1–4.8)
LYMPHOCYTES NFR BLD: 17 % (ref 18–48)
MAGNESIUM SERPL-MCNC: 1.9 MG/DL (ref 1.6–2.6)
MCH RBC QN AUTO: 26.5 PG (ref 27–31)
MCHC RBC AUTO-ENTMCNC: 30.8 G/DL (ref 32–36)
MCV RBC AUTO: 86 FL (ref 82–98)
MONOCYTES # BLD AUTO: 0.9 K/UL (ref 0.3–1)
MONOCYTES NFR BLD: 6.7 % (ref 4–15)
NEUTROPHILS # BLD AUTO: 9.7 K/UL (ref 1.8–7.7)
NEUTROPHILS NFR BLD: 75.1 % (ref 38–73)
NRBC BLD-RTO: 0 /100 WBC
PHOSPHATE SERPL-MCNC: 3.4 MG/DL (ref 2.7–4.5)
PLATELET # BLD AUTO: 344 K/UL (ref 150–450)
PMV BLD AUTO: 10.8 FL (ref 9.2–12.9)
POTASSIUM SERPL-SCNC: 2.9 MMOL/L (ref 3.5–5.1)
PREALB SERPL-MCNC: 31 MG/DL (ref 20–43)
PROT SERPL-MCNC: 8.7 G/DL (ref 6–8.4)
PROTHROMBIN TIME: 11 SEC (ref 9–12.5)
RBC # BLD AUTO: 4.72 M/UL (ref 4–5.4)
SODIUM SERPL-SCNC: 141 MMOL/L (ref 136–145)
TRANSFERRIN SERPL-MCNC: 311 MG/DL (ref 200–375)
WBC # BLD AUTO: 12.96 K/UL (ref 3.9–12.7)

## 2022-06-30 PROCEDURE — 84466 ASSAY OF TRANSFERRIN: CPT | Performed by: STUDENT IN AN ORGANIZED HEALTH CARE EDUCATION/TRAINING PROGRAM

## 2022-06-30 PROCEDURE — 99285 EMERGENCY DEPT VISIT HI MDM: CPT | Mod: 25

## 2022-06-30 PROCEDURE — 99285 PR EMERGENCY DEPT VISIT,LEVEL V: ICD-10-PCS | Mod: ,,, | Performed by: EMERGENCY MEDICINE

## 2022-06-30 PROCEDURE — 63600175 PHARM REV CODE 636 W HCPCS: Performed by: STUDENT IN AN ORGANIZED HEALTH CARE EDUCATION/TRAINING PROGRAM

## 2022-06-30 PROCEDURE — 84134 ASSAY OF PREALBUMIN: CPT | Performed by: STUDENT IN AN ORGANIZED HEALTH CARE EDUCATION/TRAINING PROGRAM

## 2022-06-30 PROCEDURE — 24505 CLTX HUMRL SHFT FX W/MNPJ: CPT | Mod: RT

## 2022-06-30 PROCEDURE — 25000003 PHARM REV CODE 250: Performed by: EMERGENCY MEDICINE

## 2022-06-30 PROCEDURE — 86850 RBC ANTIBODY SCREEN: CPT | Performed by: STUDENT IN AN ORGANIZED HEALTH CARE EDUCATION/TRAINING PROGRAM

## 2022-06-30 PROCEDURE — 99285 EMERGENCY DEPT VISIT HI MDM: CPT | Mod: ,,, | Performed by: EMERGENCY MEDICINE

## 2022-06-30 PROCEDURE — 85025 COMPLETE CBC W/AUTO DIFF WBC: CPT | Performed by: STUDENT IN AN ORGANIZED HEALTH CARE EDUCATION/TRAINING PROGRAM

## 2022-06-30 PROCEDURE — 85610 PROTHROMBIN TIME: CPT | Performed by: STUDENT IN AN ORGANIZED HEALTH CARE EDUCATION/TRAINING PROGRAM

## 2022-06-30 PROCEDURE — 84100 ASSAY OF PHOSPHORUS: CPT | Performed by: STUDENT IN AN ORGANIZED HEALTH CARE EDUCATION/TRAINING PROGRAM

## 2022-06-30 PROCEDURE — 80053 COMPREHEN METABOLIC PANEL: CPT | Performed by: STUDENT IN AN ORGANIZED HEALTH CARE EDUCATION/TRAINING PROGRAM

## 2022-06-30 PROCEDURE — 83735 ASSAY OF MAGNESIUM: CPT | Performed by: STUDENT IN AN ORGANIZED HEALTH CARE EDUCATION/TRAINING PROGRAM

## 2022-06-30 PROCEDURE — 96374 THER/PROPH/DIAG INJ IV PUSH: CPT

## 2022-06-30 PROCEDURE — 83036 HEMOGLOBIN GLYCOSYLATED A1C: CPT | Performed by: STUDENT IN AN ORGANIZED HEALTH CARE EDUCATION/TRAINING PROGRAM

## 2022-06-30 RX ORDER — OXYCODONE HYDROCHLORIDE 5 MG/1
5 TABLET ORAL
Status: COMPLETED | OUTPATIENT
Start: 2022-06-30 | End: 2022-06-30

## 2022-06-30 RX ORDER — ONDANSETRON 4 MG/1
4 TABLET, FILM COATED ORAL EVERY 6 HOURS
Qty: 12 TABLET | Refills: 0 | Status: ON HOLD | OUTPATIENT
Start: 2022-06-30 | End: 2023-09-21 | Stop reason: HOSPADM

## 2022-06-30 RX ORDER — ACETAMINOPHEN 500 MG
1000 TABLET ORAL
Status: COMPLETED | OUTPATIENT
Start: 2022-06-30 | End: 2022-06-30

## 2022-06-30 RX ORDER — LABETALOL 100 MG/1
100 TABLET, FILM COATED ORAL
Status: COMPLETED | OUTPATIENT
Start: 2022-06-30 | End: 2022-06-30

## 2022-06-30 RX ORDER — HYDROCODONE BITARTRATE AND ACETAMINOPHEN 5; 325 MG/1; MG/1
1 TABLET ORAL EVERY 4 HOURS PRN
Qty: 18 TABLET | Refills: 0 | Status: ON HOLD | OUTPATIENT
Start: 2022-06-30 | End: 2023-09-21 | Stop reason: HOSPADM

## 2022-06-30 RX ORDER — ONDANSETRON 4 MG/1
4 TABLET, ORALLY DISINTEGRATING ORAL
Status: COMPLETED | OUTPATIENT
Start: 2022-06-30 | End: 2022-06-30

## 2022-06-30 RX ORDER — LORAZEPAM 2 MG/ML
1 INJECTION INTRAMUSCULAR ONCE
Status: COMPLETED | OUTPATIENT
Start: 2022-06-30 | End: 2022-06-30

## 2022-06-30 RX ORDER — AMLODIPINE BESYLATE 5 MG/1
5 TABLET ORAL
Status: COMPLETED | OUTPATIENT
Start: 2022-06-30 | End: 2022-06-30

## 2022-06-30 RX ORDER — DOCUSATE SODIUM 100 MG/1
100 CAPSULE, LIQUID FILLED ORAL DAILY
Qty: 10 CAPSULE | Refills: 0 | Status: SHIPPED | OUTPATIENT
Start: 2022-06-30 | End: 2022-07-10

## 2022-06-30 RX ADMIN — LORAZEPAM 1 MG: 2 INJECTION INTRAMUSCULAR; INTRAVENOUS at 09:06

## 2022-06-30 RX ADMIN — LABETALOL HYDROCHLORIDE 100 MG: 100 TABLET, FILM COATED ORAL at 07:06

## 2022-06-30 RX ADMIN — AMLODIPINE BESYLATE 5 MG: 5 TABLET ORAL at 07:06

## 2022-06-30 RX ADMIN — OXYCODONE 5 MG: 5 TABLET ORAL at 07:06

## 2022-06-30 RX ADMIN — ACETAMINOPHEN 1000 MG: 500 TABLET ORAL at 05:06

## 2022-06-30 RX ADMIN — ONDANSETRON 4 MG: 4 TABLET, ORALLY DISINTEGRATING ORAL at 08:06
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
## 2022-06-30 NOTE — ED NOTES
Appearance:  Pt awake, alert & oriented to person, place & time.  Pt in no acute distress at present time.  Skin:  Skin warm, dry & intact.  Mucous membranes moist.  Skin turgor normal.m  Hematoma to left forehead.  Respiratory:  Respirations even, non-labored.    Neurologic:  Pt moving all extremities without difficulty.  Sensation intact.     Peripheral Vascular:  All peripheral pulses present.  Abdomen:  Abdomen soft, non-tender to palpation.
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
## 2022-06-30 NOTE — Clinical Note
"Olga Lopez (Kathy) was seen and treated in our emergency department on 6/30/2022.  She may return to work on 07/07/2022.       If you have any questions or concerns, please don't hesitate to call.      Clem Gaspar, DO"

## 2022-07-01 DIAGNOSIS — S42.351A CLOSED DISPLACED COMMINUTED FRACTURE OF SHAFT OF RIGHT HUMERUS, INITIAL ENCOUNTER: Primary | ICD-10-CM

## 2022-07-01 RX ORDER — MUPIROCIN 20 MG/G
OINTMENT TOPICAL
Status: CANCELLED | OUTPATIENT
Start: 2022-07-01

## 2022-07-01 RX ORDER — SODIUM CHLORIDE 9 MG/ML
INJECTION, SOLUTION INTRAVENOUS CONTINUOUS
Status: CANCELLED | OUTPATIENT
Start: 2022-07-01

## 2022-07-01 NOTE — SUBJECTIVE & OBJECTIVE
Past Medical History:   Diagnosis Date    COPD (chronic obstructive pulmonary disease)     GERD (gastroesophageal reflux disease)     Hypertension     Seasonal allergies        Past Surgical History:   Procedure Laterality Date    bladder lift  1994    BREAST SURGERY      EXCISION OF LESION OF THYROID      EYE SURGERY Right 1968    HYSTERECTOMY      OPEN REDUCTION AND INTERNAL FIXATION (ORIF) OF FRACTURE OF OLECRANON PROCESS OF ULNA Right 9/30/2020    Procedure: ORIF, FRACTURE, OLECRANON;  Surgeon: Luis Davidson MD;  Location: [facility];  Service: Orthopedics;  Laterality: Right;  too instruments, k-wire and cable     C-ARM    R Breast Mass Removal Right 02/27/2020    benign    REDUCTION OF BOTH BREASTS      REMOVAL OF HARDWARE FROM UPPER EXTREMITY  05/10/2021    right arm. removal orthopedic hardware     TUBAL LIGATION         Review of patient's allergies indicates:   Allergen Reactions    Vibramycin [doxycycline calcium] Hives    Codeine     Pcn [penicillins]     Sulfa (sulfonamide antibiotics)     Tetracyclines        No current facility-administered medications for this encounter.     Current Outpatient Medications   Medication Sig    acetaminophen (TYLENOL) 500 MG tablet Take 500 mg by mouth 2 (two) times daily.    amLODIPine (NORVASC) 2.5 MG tablet Take 5 mg by mouth 2 (two) times daily.     aspirin (ECOTRIN) 81 MG EC tablet Take 81 mg by mouth once daily.    atorvastatin (LIPITOR) 40 MG tablet Take 40 mg by mouth once daily.    blood sugar diagnostic Strp One test strip use 1 times a day to check blood glucose,  ICD-10: E11.9, compatible with insurance/glucometer    blood-glucose meter kit One glucometer, use to check 1 times a day.   ICD-10: E11.9,  Dispense machine covered by insurance    CETIRIZINE HCL (ZYRTEC ORAL) Take by mouth.    chlorthalidone (HYGROTEN) 25 MG Tab Take 25 mg by mouth once daily.    docusate sodium (COLACE) 100 MG capsule Take 1 capsule (100 mg total) by mouth once daily at 6am.  for 10 days    furosemide (LASIX) 40 MG tablet Take 40 mg by mouth once daily.    HYDROcodone-acetaminophen (NORCO) 5-325 mg per tablet Take 1 tablet by mouth every 4 (four) hours as needed for Pain.    labetaloL (NORMODYNE) 100 MG tablet Take 100 mg by mouth 3 (three) times daily with meals.    lancets Misc One lancets use 1 times a day to check blood glucose, ICD-10: E11.9    lancing device Misc One device, used to check blood glucose, ICD-10: E11.9    levothyroxine (SYNTHROID) 50 MCG tablet Take 1 tablet (50 mcg total) by mouth before breakfast.    losartan (COZAAR) 50 MG tablet Take 50 mg by mouth once daily.    metFORMIN (GLUCOPHAGE-XR) 500 MG ER 24hr tablet Take 1 tablet (500 mg total) by mouth 2 (two) times daily with meals.    omega-3 fatty acids/fish oil (FISH OIL-OMEGA-3 FATTY ACIDS) 300-1,000 mg capsule Take by mouth once daily.    omeprazole (PRILOSEC) 20 MG capsule Take 20 mg by mouth once daily.    ondansetron (ZOFRAN) 4 MG tablet Take 1 tablet (4 mg total) by mouth every 6 (six) hours.    potassium chloride 10 mEq/100 mL IVPB Inject 10 mEq into the vein once.     Facility-Administered Medications Ordered in Other Encounters   Medication    0.9%  NaCl infusion    lactated ringers infusion     Family History    None       Tobacco Use    Smoking status: Never Smoker    Smokeless tobacco: Never Used   Substance and Sexual Activity    Alcohol use: No    Drug use: Never    Sexual activity: Not on file     ROS  Constitutional: negative for fevers/chills/night sweats  Eyes: no acute visual changes  ENT: negative acute  for hearing loss  Respiratory: negative for dyspnea  Cardiovascular: negative for chest pain  Gastrointestinal: negative for abdominal pain  Genitourinary: negative for dysuria  Neurological: negative for headaches  Behavioral/Psych: negative for hallucinations  Endocrine: negative for temperature intolerance  MSK: per HPI    Objective:     Vital Signs (Most Recent):  Temp: 98.1 °F (36.7 °C)  "(06/30/22 1617)  Pulse: 100 (06/30/22 1925)  Resp: 20 (06/30/22 1925)  BP: (!) 186/116 (06/30/22 1925)  SpO2: 98 % (06/30/22 1925)   Vital Signs (24h Range):  Temp:  [98.1 °F (36.7 °C)] 98.1 °F (36.7 °C)  Pulse:  [] 100  Resp:  [15-20] 20  SpO2:  [98 %] 98 %  BP: (186-224)/(116-117) 186/116     Weight: 84.4 kg (186 lb)  Height: 5' 1" (154.9 cm)  Body mass index is 35.14 kg/m².    No intake or output data in the 24 hours ending 06/30/22 2246    Ortho/SPM Exam    General:  no acute distress, appears stated age   Neuro: alert and oriented x3  Psych: normal mood  Head: normocephalic, atraumatic.  Eyes: no scleral icterus  Mouth: moist mucous membranes  Cardiovascular: extremities warm and well perfused  Lungs: breathing comfortably, equal chest rise bilat  Skin: clean, dry, intact (any exceptions noted in below musculoskeletal exam)       Musculoskeletal  Right Upper Extremity     -Skin, Intact : no ecchymosis, lesions, lacerations or abrasions   -TTP over the proximal humeral shaft   -Deltoid, biceps, triceps intact but limited by pain  -Compartments soft and compressible  -PROM full in wrist and elbow, limited extension of the thumb and wrist (1/5) and mild paresthesias over the radial dorsal hand    -Motor intact Ain/U/Ax  -WWP     Left Upper Extremity     -Skin, Intact : no ecchymosis, lesions, lacerations or abrasions   -Non tender to palpation of entire arm   -Deltoid, biceps, triceps intact   -Compartments soft and compressible  -A/P ROM full in fingers, wrist, elbow, shoulder  -SILT M/R/U/Ax  -Motor intact Ain/PIN/U/Ax  -WWP     Right Lower Extremity Exam     - Skin Intact : no ecchymosis, lesions, lacerations or abrasions   - Non-tender to palpation of the entire leg  - Quad/ Hip flexor intact   - Compartments soft and compressible  - A/P ROM full to the toes, ankle, knee, and hip  - TA/EHL/Gastroc/FHL intact  - SILT throughout  - WWP        Left Lower Extremity Exam    - Skin Intact : no ecchymosis, " lesions, lacerations or abrasions   - Non-tender to palpation of the entire leg  - Quad/ Hip flexor intact   - Compartments soft and compressible  - A/P ROM full to the toes, ankle, knee, and hip  - TA/EHL/Gastroc/FHL intact  - SILT throughout  - WWP    Spine: No step off of spinous process tenderness throughout, No sacral ulcers.      All joints (shoulder/elbow/wrist/hip/knee/ankle) were examined and had full ROM and were non-tender to palpation except as above     Significant Labs: All pertinent labs within the past 24 hours have been reviewed.    Significant Imaging: I have reviewed and interpreted all pertinent imaging results/findings. Long oblique fracture of the right proximal humeral shaft.
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
## 2022-07-05 ENCOUNTER — TELEPHONE (OUTPATIENT)
Dept: ORTHOPEDICS | Facility: CLINIC | Age: 57
End: 2022-07-05
Payer: COMMERCIAL

## 2022-07-05 NOTE — TELEPHONE ENCOUNTER
Spoke with pt in regards to up coming surgery, stated to pt appt was being scheduled for her to come in and sign consents. Pt stated she was already seeing someone else for another issue and she was going to f/u up with that same provider for her humerus. (Dr. manning)

## 2022-07-20 ENCOUNTER — TELEPHONE (OUTPATIENT)
Dept: DIABETES | Facility: CLINIC | Age: 57
End: 2022-07-20
Payer: COMMERCIAL

## 2022-08-09 ENCOUNTER — HOSPITAL ENCOUNTER (EMERGENCY)
Facility: HOSPITAL | Age: 57
Discharge: HOME | End: 2022-08-09
Attending: EMERGENCY MEDICINE
Payer: COMMERCIAL

## 2022-08-09 VITALS
HEART RATE: 72 BPM | OXYGEN SATURATION: 97 % | SYSTOLIC BLOOD PRESSURE: 154 MMHG | RESPIRATION RATE: 20 BRPM | BODY MASS INDEX: 35.12 KG/M2 | TEMPERATURE: 98 F | WEIGHT: 186 LBS | HEIGHT: 61 IN | DIASTOLIC BLOOD PRESSURE: 87 MMHG

## 2022-08-09 DIAGNOSIS — R04.0 EPISTAXIS: Primary | ICD-10-CM

## 2022-08-09 LAB
ALBUMIN SERPL BCP-MCNC: 3.5 G/DL (ref 3.5–5.2)
ALP SERPL-CCNC: 111 U/L (ref 55–135)
ALT SERPL W/O P-5'-P-CCNC: 17 U/L (ref 10–44)
ANION GAP SERPL CALC-SCNC: 10 MMOL/L (ref 8–16)
APTT BLDCRRT: 27.3 SEC (ref 21–32)
AST SERPL-CCNC: 19 U/L (ref 10–40)
BASOPHILS # BLD AUTO: 0.06 K/UL (ref 0–0.2)
BASOPHILS NFR BLD: 1 % (ref 0–1.9)
BILIRUB SERPL-MCNC: 0.2 MG/DL (ref 0.1–1)
BUN SERPL-MCNC: 8 MG/DL (ref 6–20)
CALCIUM SERPL-MCNC: 9.6 MG/DL (ref 8.7–10.5)
CHLORIDE SERPL-SCNC: 100 MMOL/L (ref 95–110)
CO2 SERPL-SCNC: 34 MMOL/L (ref 23–29)
CREAT SERPL-MCNC: 0.8 MG/DL (ref 0.5–1.4)
DIFFERENTIAL METHOD: ABNORMAL
EOSINOPHIL # BLD AUTO: 0.1 K/UL (ref 0–0.5)
EOSINOPHIL NFR BLD: 1 % (ref 0–8)
ERYTHROCYTE [DISTWIDTH] IN BLOOD BY AUTOMATED COUNT: 15.3 % (ref 11.5–14.5)
EST. GFR  (NO RACE VARIABLE): >60 ML/MIN/1.73 M^2
GLUCOSE SERPL-MCNC: 114 MG/DL (ref 70–110)
HCT VFR BLD AUTO: 37.9 % (ref 37–48.5)
HGB BLD-MCNC: 10.9 G/DL (ref 12–16)
IMM GRANULOCYTES # BLD AUTO: 0.02 K/UL (ref 0–0.04)
IMM GRANULOCYTES NFR BLD AUTO: 0.3 % (ref 0–0.5)
INR PPP: 1 (ref 0.8–1.2)
LYMPHOCYTES # BLD AUTO: 2 K/UL (ref 1–4.8)
LYMPHOCYTES NFR BLD: 34.5 % (ref 18–48)
MCH RBC QN AUTO: 24.4 PG (ref 27–31)
MCHC RBC AUTO-ENTMCNC: 28.8 G/DL (ref 32–36)
MCV RBC AUTO: 85 FL (ref 82–98)
MONOCYTES # BLD AUTO: 0.6 K/UL (ref 0.3–1)
MONOCYTES NFR BLD: 10.5 % (ref 4–15)
NEUTROPHILS # BLD AUTO: 3.1 K/UL (ref 1.8–7.7)
NEUTROPHILS NFR BLD: 52.7 % (ref 38–73)
NRBC BLD-RTO: 0 /100 WBC
PLATELET # BLD AUTO: 363 K/UL (ref 150–450)
PMV BLD AUTO: 10.4 FL (ref 9.2–12.9)
POTASSIUM SERPL-SCNC: 3.2 MMOL/L (ref 3.5–5.1)
PROT SERPL-MCNC: 8.6 G/DL (ref 6–8.4)
PROTHROMBIN TIME: 10.6 SEC (ref 9–12.5)
RBC # BLD AUTO: 4.46 M/UL (ref 4–5.4)
SODIUM SERPL-SCNC: 144 MMOL/L (ref 136–145)
WBC # BLD AUTO: 5.92 K/UL (ref 3.9–12.7)

## 2022-08-09 PROCEDURE — 99283 EMERGENCY DEPT VISIT LOW MDM: CPT

## 2022-08-09 PROCEDURE — 85610 PROTHROMBIN TIME: CPT | Performed by: EMERGENCY MEDICINE

## 2022-08-09 PROCEDURE — 80053 COMPREHEN METABOLIC PANEL: CPT | Performed by: EMERGENCY MEDICINE

## 2022-08-09 PROCEDURE — 85730 THROMBOPLASTIN TIME PARTIAL: CPT | Performed by: EMERGENCY MEDICINE

## 2022-08-09 PROCEDURE — 99282 EMERGENCY DEPT VISIT SF MDM: CPT | Mod: ,,, | Performed by: EMERGENCY MEDICINE

## 2022-08-09 PROCEDURE — 85025 COMPLETE CBC W/AUTO DIFF WBC: CPT | Performed by: EMERGENCY MEDICINE

## 2022-08-09 PROCEDURE — 99282 PR EMERGENCY DEPT VISIT,LEVEL II: ICD-10-PCS | Mod: ,,, | Performed by: EMERGENCY MEDICINE

## 2022-08-09 RX ORDER — OXYMETAZOLINE HCL 0.05 %
1 SPRAY, NON-AEROSOL (ML) NASAL
Status: DISCONTINUED | OUTPATIENT
Start: 2022-08-09 | End: 2022-08-09 | Stop reason: HOSPADM
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
## 2022-08-09 NOTE — ED PROVIDER NOTES
"Encounter Date: 8/9/2022    SCRIBE #1 NOTE: I, Alison Roblero, am scribing for, and in the presence of,  Erin Soto MD. I have scribed the following portions of the note - Other sections scribed: HPI ROS PE.       History     Chief Complaint   Patient presents with    Epistaxis     Nose bleed started 2 hours ago, "went through six paper towels", bleeding controlled at this time but couple drops down back of throat, not on blood thinners     Time patient was seen by the provider: 2:56 PM      The patient is a 56 y.o. female with past medical history of COPD, HTN, GERD, who presents to the ED with a complaint of epistaxis onset 3 hours PTA. The patient reports of bleeding through the left nare. States she went through 6 paper towels because of excessive bleeding. She had 3 episodes of bleeding in a 2 hour period. When blowing her nose blood clots would come out. The bleeding is now controlled at this time. Denies any trauma or injury to the nose. Patient is not on any blood thinners.     The history is provided by the patient and medical records. No  was used.     Review of patient's allergies indicates:   Allergen Reactions    Vibramycin [doxycycline calcium] Hives    Codeine     Pcn [penicillins]     Sulfa (sulfonamide antibiotics)     Tetracyclines      Past Medical History:   Diagnosis Date    COPD (chronic obstructive pulmonary disease)     GERD (gastroesophageal reflux disease)     Hypertension     Seasonal allergies      Past Surgical History:   Procedure Laterality Date    bladder lift  1994    BREAST SURGERY      EXCISION OF LESION OF THYROID      EYE SURGERY Right 1968    HYSTERECTOMY      OPEN REDUCTION AND INTERNAL FIXATION (ORIF) OF FRACTURE OF OLECRANON PROCESS OF ULNA Right 9/30/2020    Procedure: ORIF, FRACTURE, OLECRANON;  Surgeon: Luis Davidson MD;  Location: [facility] OR;  Service: Orthopedics;  Laterality: Right;  too instruments, k-wire and cable "     C-ARM    R Breast Mass Removal Right 02/27/2020    benign    REDUCTION OF BOTH BREASTS      REMOVAL OF HARDWARE FROM UPPER EXTREMITY  05/10/2021    right arm. removal orthopedic hardware     TUBAL LIGATION       No family history on file.  Social History     Tobacco Use    Smoking status: Never Smoker    Smokeless tobacco: Never Used   Substance Use Topics    Alcohol use: No    Drug use: Never     Review of Systems   Constitutional: Negative for fever.   HENT: Positive for nosebleeds (left nares).    Respiratory: Negative for shortness of breath.    Cardiovascular: Negative for chest pain.   Gastrointestinal: Negative for nausea.   Genitourinary: Negative for dysuria.   Musculoskeletal: Negative for back pain.   Skin: Negative for rash.   Neurological: Negative for weakness.   Hematological: Does not bruise/bleed easily.       Physical Exam     Initial Vitals [08/09/22 1305]   BP Pulse Resp Temp SpO2   (!) 177/99 83 18 98.2 °F (36.8 °C) 96 %      MAP       --         Physical Exam    Nursing note and vitals reviewed.  Constitutional: Vital signs are normal. She appears well-developed and well-nourished.  Non-toxic appearance. She does not appear ill.   HENT:   Head: Normocephalic and atraumatic.   Mouth/Throat: Mucous membranes are normal. Mucous membranes are not dry.   Evidence of dried blood in the left nostril but no active bleeding. No blood in posterior pharynx.    Eyes: Conjunctivae and lids are normal.   No conjunctival pallor   Neck: Neck supple.   Normal range of motion.  Cardiovascular: Normal rate.   Musculoskeletal:      Cervical back: Normal range of motion and neck supple.     Neurological: She is alert and oriented to person, place, and time.   Skin: Skin is dry and intact. No pallor.   Psychiatric: She has a normal mood and affect. Her speech is normal and behavior is normal.         ED Course   Procedures  Labs Reviewed   CBC W/ AUTO DIFFERENTIAL - Abnormal; Notable for the following  components:       Result Value    Hemoglobin 10.9 (*)     MCH 24.4 (*)     MCHC 28.8 (*)     RDW 15.3 (*)     All other components within normal limits   COMPREHENSIVE METABOLIC PANEL - Abnormal; Notable for the following components:    Potassium 3.2 (*)     CO2 34 (*)     Glucose 114 (*)     Total Protein 8.6 (*)     All other components within normal limits   PROTIME-INR   APTT          Imaging Results    None          Medications   oxymetazoline 0.05 % nasal spray 1 spray (1 spray Each Nostril Not Given 8/9/22 2192)     Medical Decision Making:   History:   Old Medical Records: I decided to obtain old medical records.  Initial Assessment:   Resolved left-sided epistaxis  No anticoagulants or anti-platelet agents and no history of trauma    Labs ordered prior to my assessment and all stable  Clinical Tests:   Lab Tests: Reviewed  ED Management:  Education provided to the patient and her family about outpatient management of epistaxis  Advised to by Afrin as we did not have any available in the ED  I have made her a external compression device as well to use if her epistaxis recurs  ED return precautions discussed  ENT referral placed          Scribe Attestation:   Scribe #1: I performed the above scribed service and the documentation accurately describes the services I performed. I attest to the accuracy of the note.                I, Dr. Erin Soto, personally performed the services described in this documentation. All medical record entries made by the scribe were at my direction and in my presence.  I have reviewed the chart and agree that the record reflects my personal performance and is accurate and complete. Erin Soto MD.  3:46 PM 08/09/2022    Clinical Impression:   Final diagnoses:  [R04.0] Epistaxis (Primary)          ED Disposition Condition    Discharge Stable        ED Prescriptions     None        Follow-up Information     Follow up With Specialties Details Why Contact Info    [facility]  Otolaryngology Schedule an appointment as soon as possible for a visit   [address]  [facility] [address]  [phone]           Erin Soto MD  08/09/22 4527

## 2022-08-22 ENCOUNTER — HOSPITAL ENCOUNTER (EMERGENCY)
Facility: HOSPITAL | Age: 57
Discharge: HOME | End: 2022-08-22
Attending: EMERGENCY MEDICINE
Payer: COMMERCIAL

## 2022-08-22 VITALS
OXYGEN SATURATION: 96 % | HEART RATE: 95 BPM | HEIGHT: 61 IN | WEIGHT: 185 LBS | SYSTOLIC BLOOD PRESSURE: 169 MMHG | TEMPERATURE: 98 F | DIASTOLIC BLOOD PRESSURE: 95 MMHG | BODY MASS INDEX: 34.93 KG/M2 | RESPIRATION RATE: 18 BRPM

## 2022-08-22 DIAGNOSIS — R04.0 LEFT-SIDED EPISTAXIS: Primary | ICD-10-CM

## 2022-08-22 PROCEDURE — 99283 EMERGENCY DEPT VISIT LOW MDM: CPT | Mod: 25

## 2022-08-22 PROCEDURE — 99284 PR EMERGENCY DEPT VISIT,LEVEL IV: ICD-10-PCS | Mod: 25,,, | Performed by: EMERGENCY MEDICINE

## 2022-08-22 PROCEDURE — 99284 EMERGENCY DEPT VISIT MOD MDM: CPT | Mod: 25,,, | Performed by: EMERGENCY MEDICINE

## 2022-08-22 PROCEDURE — 30903 CONTROL OF NOSEBLEED: CPT | Mod: LT,,, | Performed by: EMERGENCY MEDICINE

## 2022-08-22 PROCEDURE — 25000003 PHARM REV CODE 250: Performed by: EMERGENCY MEDICINE

## 2022-08-22 PROCEDURE — 30901 CONTROL OF NOSEBLEED: CPT | Mod: LT

## 2022-08-22 PROCEDURE — 30903 PR CTRL NOSEBLEED,ANTER,COMPLEX: ICD-10-PCS | Mod: LT,,, | Performed by: EMERGENCY MEDICINE

## 2022-08-22 RX ORDER — LIDOCAINE HCL/EPINEPHRINE/PF 2%-1:200K
10 VIAL (ML) INJECTION
Status: COMPLETED | OUTPATIENT
Start: 2022-08-22 | End: 2022-08-22

## 2022-08-22 RX ORDER — LIDOCAINE HYDROCHLORIDE AND EPINEPHRINE 10; 10 MG/ML; UG/ML
10 INJECTION, SOLUTION INFILTRATION; PERINEURAL ONCE
Status: DISCONTINUED | OUTPATIENT
Start: 2022-08-22 | End: 2022-08-22

## 2022-08-22 RX ORDER — SILVER NITRATE 38.21; 12.74 MG/1; MG/1
1 STICK TOPICAL
Status: COMPLETED | OUTPATIENT
Start: 2022-08-22 | End: 2022-08-22

## 2022-08-22 RX ADMIN — LIDOCAINE HYDROCHLORIDE,EPINEPHRINE BITARTRATE 10 ML: 20; .005 INJECTION, SOLUTION EPIDURAL; INFILTRATION; INTRACAUDAL; PERINEURAL at 09:08

## 2022-08-22 RX ADMIN — PHENYLEPHRINE HYDROCHLORIDE 2 SPRAY: 0.25 SPRAY NASAL at 09:08

## 2022-08-22 RX ADMIN — SILVER NITRATE APPLICATORS 1 APPLICATOR: 25; 75 STICK TOPICAL at 09:08

## 2022-08-23 ENCOUNTER — TELEPHONE (OUTPATIENT)
Dept: OTOLARYNGOLOGY | Facility: CLINIC | Age: 57
End: 2022-08-23
Payer: COMMERCIAL

## 2022-08-23 NOTE — DISCHARGE INSTRUCTIONS
Take Tylenol over the counter as directed on packaging as needed for pain.    Use afrin as directed on packaging.    Our goal in the emergency department is to always give you outstanding care and exceptional service. You may receive a survey by mail or e-mail in the next week regarding your experience in our ED. We would greatly appreciate your completing and returning the survey. Your feedback provides us with a way to recognize our staff who give very good care and it helps us learn how to improve when your experience was below our aspiration of excellence.

## 2022-08-23 NOTE — ED PROVIDER NOTES
"Encounter Date: 8/22/2022    SCRIBE #1 NOTE: I, Michellbasia Licea, am scribing for, and in the presence of,  Christopher Davis MD. I have scribed the following portions of the note - Other sections scribed: HPI, ROS, PE.   SCRIBE #2 NOTE: I, Arlene Hilton, am scribing for, and in the presence of,  Christopher Davis MD. I have scribed the following portions of the note - Other sections scribed: HPI, ROS, PE.     History     Chief Complaint   Patient presents with    Epistaxis     Pt c/o multiple nosebleeds throughout the day x 2 weeks. Pt has two tongue blades squeezing nose closed. No active bleeding noted when removed.C/o lightheadedness and generalized weakness      Time patient was seen by the provider: 9:11 PM      The patient is a 56 y.o. female with past medical history of COPD, GERD, HTN, broken right arm who presents to the ED with a complaint of epistaxis with onset 2 weeks ago. She reports multiple episodes of nose bleeds throughout the day. She describes the nose bleed as "dripping like a faucet" and notes clotting. She reports that the nose bleeds are predominantly left sided. Associated symptoms include nausea and lightheadedness. She came to the ED 2 weeks ago for similar symptoms and was referred to ENT. She went to ENT 4 days ago who did not give her a diagnosis. She has been using afrin. She is not on blood thinners. Denies vomiting, diarrhea, fever, cough, SOB, chest pain, abdominal pain, dysuria. A ten point review of systems was completed and is negative except as documented above.  Patient denies any other acute medical complaint. The patients available PMH, PSH, Social History, medications, allergies, and triage vital signs were reviewed just prior to their medical evaluation.       The history is provided by the patient and medical records. No  was used.     Review of patient's allergies indicates:   Allergen Reactions    Vibramycin [doxycycline calcium] Hives    " Codeine     Pcn [penicillins]     Sulfa (sulfonamide antibiotics)     Tetracyclines      Past Medical History:   Diagnosis Date    COPD (chronic obstructive pulmonary disease)     GERD (gastroesophageal reflux disease)     Hypertension     Seasonal allergies      Past Surgical History:   Procedure Laterality Date    bladder lift  1994    BREAST SURGERY      EXCISION OF LESION OF THYROID      EYE SURGERY Right 1968    HYSTERECTOMY      OPEN REDUCTION AND INTERNAL FIXATION (ORIF) OF FRACTURE OF OLECRANON PROCESS OF ULNA Right 9/30/2020    Procedure: ORIF, FRACTURE, OLECRANON;  Surgeon: Luis Davidson MD;  Location: [facility];  Service: Orthopedics;  Laterality: Right;  too instruments, k-wire and cable     C-ARM    R Breast Mass Removal Right 02/27/2020    benign    REDUCTION OF BOTH BREASTS      REMOVAL OF HARDWARE FROM UPPER EXTREMITY  05/10/2021    right arm. removal orthopedic hardware     TUBAL LIGATION       No family history on file.  Social History     Tobacco Use    Smoking status: Never Smoker    Smokeless tobacco: Never Used   Substance Use Topics    Alcohol use: No    Drug use: Never     Review of Systems   Constitutional: Negative for fever.   HENT: Positive for nosebleeds. Negative for sore throat.    Eyes: Negative for visual disturbance.   Respiratory: Negative for cough and shortness of breath.    Cardiovascular: Negative for chest pain.   Gastrointestinal: Positive for nausea. Negative for abdominal pain, diarrhea and vomiting.   Genitourinary: Negative for dysuria.   Musculoskeletal: Negative for neck pain.   Skin: Negative for rash and wound.   Allergic/Immunologic: Negative for immunocompromised state.   Neurological: Positive for light-headedness. Negative for syncope.   Psychiatric/Behavioral: Negative for confusion.       Physical Exam     Initial Vitals [08/22/22 2018]   BP Pulse Resp Temp SpO2   (!) 169/95 95 18 97.7 °F (36.5 °C) 96 %      MAP       --          Physical Exam    Nursing note and vitals reviewed.  Constitutional: She appears well-developed and well-nourished. She is not diaphoretic. No distress.   HENT:   Head: Normocephalic and atraumatic.   Nose: Epistaxis is observed.   Anterior nose bleed to medial wall of the left nare   Eyes: Conjunctivae are normal. Right eye exhibits no discharge. Left eye exhibits no discharge.   Neck: Neck supple.   Normal range of motion.  Cardiovascular: Normal rate, regular rhythm and normal heart sounds. Exam reveals no gallop and no friction rub.    No murmur heard.  Pulmonary/Chest: Breath sounds normal. No respiratory distress. She has no wheezes. She has no rhonchi. She has no rales.   Abdominal: Abdomen is soft. She exhibits no distension. There is no abdominal tenderness. There is no rebound and no guarding.   Musculoskeletal:         General: No tenderness or edema. Normal range of motion.      Cervical back: Normal range of motion and neck supple.     Neurological: She is alert and oriented to person, place, and time. She has normal strength. GCS score is 15. GCS eye subscore is 4. GCS verbal subscore is 5. GCS motor subscore is 6.   Skin: Skin is warm and dry. No rash noted. No erythema.   Psychiatric: She has a normal mood and affect. Her behavior is normal. Judgment and thought content normal.         ED Course   Epistaxis Mgmt    Date/Time: 8/22/2022 10:16 PM  Performed by: Christopher Davis MD  Authorized by: Christopher Davis MD   Consent Done: Yes  Consent: Verbal consent obtained. Written consent not obtained.  Consent given by: patient  Patient understanding: patient states understanding of the procedure being performed  Patient consent: the patient's understanding of the procedure matches consent given  Patient identity confirmed: verbally with patient    Anesthesia:  Local Anesthetic: topical anesthetic (1% lido with epi on muroseal pad)  Anesthetic total: 10 mL    Patient sedated: no  Treatment site:  left anterior and left Kiesselbach's area  Repair method: silver nitrate, oxymetazoline and merocel sponge  Post-procedure assessment: bleeding stopped  Treatment complexity: simple  Patient tolerance: Patient tolerated the procedure well with no immediate complications        Labs Reviewed - No data to display       Imaging Results    None          Medications   phenylephrine HCL 0.25% nasal spray 2 spray (2 sprays Each Nostril Given 8/22/22 2127)   silver nitrate applicators applicator 1 applicator (1 applicator Topical (Top) Given 8/22/22 2127)   LIDOcaine-EPINEPHrine (PF) 2%-1:200,000 injection 10 mL (10 mLs Other Given by Provider 8/22/22 2126)     Medical Decision Making:   History:   I obtained history from: someone other than patient.       <> Summary of History:  assists HPI  Old Medical Records: I decided to obtain old medical records.  Old Records Summarized: other records.       <> Summary of Records: Last ED note  ED Management:  56-year-old female presents with left-sided nose bleed.  Vitals with hypertension.  Physical exam as above.  Nose bleed managed as above.  Bleeding now resolved.  Monitored in the ED and did not have return of symptoms.  Will discharge home.  She will call ENT tomorrow to arrange close follow-up.  Patient will return to ED for worsening symptoms, inability to eat/drink, fever greater than 100.4, or any other concerns.  Did bedside teaching with return precautions.  All questions answered.  The patient acknowledges understanding.  Gave verbal discharge instructions.          Scribe Attestation:   Scribe #1: I performed the above scribed service and the documentation accurately describes the services I performed. I attest to the accuracy of the note.                 Clinical Impression:   Final diagnoses:  [R04.0] Left-sided epistaxis (Primary)          ED Disposition Condition    Discharge Stable        ED Prescriptions     None        Follow-up Information     Follow up  With Specialties Details Why Contact Info    Trever Guevara MD Otolaryngology   [address]  [facility] [address]  [phone]      [facility] - Emergency Dept Emergency Medicine  Return to ED for worsening symptoms, inability to eat/drink, fever greater than 100.4, or any other concerns. [address]  [facility] [id number] [phone]           Christopher Davis MD  08/23/22 1058

## 2022-08-23 NOTE — TELEPHONE ENCOUNTER
----- Message from Mey Mary sent at 8/23/2022 11:42 AM CDT -----  Regarding: sooner appt  Contact: pt @ [phone]  Pt calling to schedule a sooner ER follow up appt with Dr. Young, was scheduled 10/4, asking to be seen sooner. Please call.

## 2022-10-06 ENCOUNTER — HOSPITAL ENCOUNTER (INPATIENT)
Facility: HOSPITAL | Age: 57
LOS: 1 days | Discharge: HOME | DRG: 291 | End: 2022-10-07
Attending: EMERGENCY MEDICINE | Admitting: INTERNAL MEDICINE
Payer: COMMERCIAL

## 2022-10-06 DIAGNOSIS — R79.89 ELEVATED BRAIN NATRIURETIC PEPTIDE (BNP) LEVEL: ICD-10-CM

## 2022-10-06 DIAGNOSIS — J81.0 ACUTE PULMONARY EDEMA: ICD-10-CM

## 2022-10-06 DIAGNOSIS — R06.02 SOB (SHORTNESS OF BREATH): Primary | ICD-10-CM

## 2022-10-06 DIAGNOSIS — R07.9 CHEST PAIN: ICD-10-CM

## 2022-10-06 DIAGNOSIS — M54.6 ACUTE LEFT-SIDED THORACIC BACK PAIN: ICD-10-CM

## 2022-10-06 DIAGNOSIS — I10 HYPERTENSION: ICD-10-CM

## 2022-10-06 DIAGNOSIS — R06.09 DYSPNEA ON EXERTION: ICD-10-CM

## 2022-10-06 DIAGNOSIS — I50.31 ACUTE DIASTOLIC CONGESTIVE HEART FAILURE: ICD-10-CM

## 2022-10-06 PROBLEM — J98.11 ATELECTASIS: Status: ACTIVE | Noted: 2022-10-06

## 2022-10-06 PROBLEM — I50.9 ACUTE CONGESTIVE HEART FAILURE: Status: ACTIVE | Noted: 2022-10-06

## 2022-10-06 PROBLEM — J44.9 COPD (CHRONIC OBSTRUCTIVE PULMONARY DISEASE): Status: ACTIVE | Noted: 2022-10-06

## 2022-10-06 PROBLEM — E78.5 HYPERLIPIDEMIA: Status: ACTIVE | Noted: 2022-10-06

## 2022-10-06 PROBLEM — J44.1 COPD EXACERBATION: Status: ACTIVE | Noted: 2022-10-06

## 2022-10-06 PROBLEM — K21.9 GERD (GASTROESOPHAGEAL REFLUX DISEASE): Status: ACTIVE | Noted: 2022-10-06

## 2022-10-06 PROBLEM — J96.01 ACUTE RESPIRATORY FAILURE WITH HYPOXIA: Status: ACTIVE | Noted: 2022-10-06

## 2022-10-06 LAB
ALBUMIN SERPL BCP-MCNC: 3.4 G/DL (ref 3.5–5.2)
ALP SERPL-CCNC: 116 U/L (ref 55–135)
ALT SERPL W/O P-5'-P-CCNC: 18 U/L (ref 10–44)
ANION GAP SERPL CALC-SCNC: 12 MMOL/L (ref 8–16)
AST SERPL-CCNC: 21 U/L (ref 10–40)
BASOPHILS # BLD AUTO: 0.06 K/UL (ref 0–0.2)
BASOPHILS NFR BLD: 0.9 % (ref 0–1.9)
BILIRUB SERPL-MCNC: 0.3 MG/DL (ref 0.1–1)
BILIRUB UR QL STRIP: NEGATIVE
BNP SERPL-MCNC: 110 PG/ML (ref 0–99)
BUN SERPL-MCNC: 5 MG/DL (ref 6–30)
BUN SERPL-MCNC: 7 MG/DL (ref 6–20)
CALCIUM SERPL-MCNC: 9.2 MG/DL (ref 8.7–10.5)
CHLORIDE SERPL-SCNC: 104 MMOL/L (ref 95–110)
CHLORIDE SERPL-SCNC: 106 MMOL/L (ref 95–110)
CLARITY UR REFRACT.AUTO: CLEAR
CO2 SERPL-SCNC: 25 MMOL/L (ref 23–29)
COLOR UR AUTO: COLORLESS
CREAT SERPL-MCNC: 0.5 MG/DL (ref 0.5–1.4)
CREAT SERPL-MCNC: 0.7 MG/DL (ref 0.5–1.4)
D DIMER PPP IA.FEU-MCNC: 1.9 MG/L FEU
DIFFERENTIAL METHOD: ABNORMAL
EOSINOPHIL # BLD AUTO: 0.1 K/UL (ref 0–0.5)
EOSINOPHIL NFR BLD: 0.8 % (ref 0–8)
ERYTHROCYTE [DISTWIDTH] IN BLOOD BY AUTOMATED COUNT: 15.8 % (ref 11.5–14.5)
EST. GFR  (NO RACE VARIABLE): >60 ML/MIN/1.73 M^2
ESTIMATED AVG GLUCOSE: 157 MG/DL (ref 68–131)
GLUCOSE SERPL-MCNC: 123 MG/DL (ref 70–110)
GLUCOSE SERPL-MCNC: 124 MG/DL (ref 70–110)
GLUCOSE UR QL STRIP: NEGATIVE
HBA1C MFR BLD: 7.1 % (ref 4–5.6)
HCT VFR BLD AUTO: 40.1 % (ref 37–48.5)
HCT VFR BLD CALC: 37 %PCV (ref 36–54)
HCV AB SERPL QL IA: NORMAL
HGB BLD-MCNC: 11.4 G/DL (ref 12–16)
HGB UR QL STRIP: NEGATIVE
HIV 1+2 AB+HIV1 P24 AG SERPL QL IA: NORMAL
IMM GRANULOCYTES # BLD AUTO: 0.03 K/UL (ref 0–0.04)
IMM GRANULOCYTES NFR BLD AUTO: 0.5 % (ref 0–0.5)
KETONES UR QL STRIP: NEGATIVE
LEUKOCYTE ESTERASE UR QL STRIP: NEGATIVE
LYMPHOCYTES # BLD AUTO: 2 K/UL (ref 1–4.8)
LYMPHOCYTES NFR BLD: 31.5 % (ref 18–48)
MCH RBC QN AUTO: 22.9 PG (ref 27–31)
MCHC RBC AUTO-ENTMCNC: 28.4 G/DL (ref 32–36)
MCV RBC AUTO: 81 FL (ref 82–98)
MONOCYTES # BLD AUTO: 0.6 K/UL (ref 0.3–1)
MONOCYTES NFR BLD: 9.8 % (ref 4–15)
NEUTROPHILS # BLD AUTO: 3.6 K/UL (ref 1.8–7.7)
NEUTROPHILS NFR BLD: 56.5 % (ref 38–73)
NITRITE UR QL STRIP: NEGATIVE
NRBC BLD-RTO: 0 /100 WBC
PH UR STRIP: 8 [PH] (ref 5–8)
PLATELET # BLD AUTO: 351 K/UL (ref 150–450)
PMV BLD AUTO: 11 FL (ref 9.2–12.9)
POC IONIZED CALCIUM: 1.1 MMOL/L (ref 1.06–1.42)
POC TCO2 (MEASURED): 29 MMOL/L (ref 23–29)
POTASSIUM BLD-SCNC: 3.2 MMOL/L (ref 3.5–5.1)
POTASSIUM SERPL-SCNC: 3.9 MMOL/L (ref 3.5–5.1)
PROCALCITONIN SERPL IA-MCNC: 0.04 NG/ML
PROT SERPL-MCNC: 9 G/DL (ref 6–8.4)
PROT UR QL STRIP: NEGATIVE
RBC # BLD AUTO: 4.97 M/UL (ref 4–5.4)
SAMPLE: ABNORMAL
SODIUM BLD-SCNC: 145 MMOL/L (ref 136–145)
SODIUM SERPL-SCNC: 143 MMOL/L (ref 136–145)
SP GR UR STRIP: >1.03 (ref 1–1.03)
TROPONIN I SERPL DL<=0.01 NG/ML-MCNC: 0.01 NG/ML (ref 0–0.03)
URN SPEC COLLECT METH UR: ABNORMAL
WBC # BLD AUTO: 6.34 K/UL (ref 3.9–12.7)

## 2022-10-06 PROCEDURE — 80053 COMPREHEN METABOLIC PANEL: CPT | Performed by: EMERGENCY MEDICINE

## 2022-10-06 PROCEDURE — 80047 BASIC METABLC PNL IONIZED CA: CPT

## 2022-10-06 PROCEDURE — 25000003 PHARM REV CODE 250

## 2022-10-06 PROCEDURE — 93010 ELECTROCARDIOGRAM REPORT: CPT | Mod: ,,, | Performed by: INTERNAL MEDICINE

## 2022-10-06 PROCEDURE — 25000003 PHARM REV CODE 250: Performed by: EMERGENCY MEDICINE

## 2022-10-06 PROCEDURE — 36415 COLL VENOUS BLD VENIPUNCTURE: CPT | Performed by: PHYSICIAN ASSISTANT

## 2022-10-06 PROCEDURE — 25000242 PHARM REV CODE 250 ALT 637 W/ HCPCS: Performed by: EMERGENCY MEDICINE

## 2022-10-06 PROCEDURE — 83880 ASSAY OF NATRIURETIC PEPTIDE: CPT | Performed by: EMERGENCY MEDICINE

## 2022-10-06 PROCEDURE — 87389 HIV-1 AG W/HIV-1&-2 AB AG IA: CPT | Performed by: PHYSICIAN ASSISTANT

## 2022-10-06 PROCEDURE — 99285 EMERGENCY DEPT VISIT HI MDM: CPT | Mod: 25

## 2022-10-06 PROCEDURE — 20600001 HC STEP DOWN PRIVATE ROOM

## 2022-10-06 PROCEDURE — 85025 COMPLETE CBC W/AUTO DIFF WBC: CPT | Performed by: EMERGENCY MEDICINE

## 2022-10-06 PROCEDURE — 63600175 PHARM REV CODE 636 W HCPCS: Performed by: PHYSICIAN ASSISTANT

## 2022-10-06 PROCEDURE — 25000003 PHARM REV CODE 250: Performed by: PHYSICIAN ASSISTANT

## 2022-10-06 PROCEDURE — 63600175 PHARM REV CODE 636 W HCPCS: Performed by: EMERGENCY MEDICINE

## 2022-10-06 PROCEDURE — 96375 TX/PRO/DX INJ NEW DRUG ADDON: CPT

## 2022-10-06 PROCEDURE — 84484 ASSAY OF TROPONIN QUANT: CPT | Performed by: EMERGENCY MEDICINE

## 2022-10-06 PROCEDURE — 94640 AIRWAY INHALATION TREATMENT: CPT | Mod: XB

## 2022-10-06 PROCEDURE — 81003 URINALYSIS AUTO W/O SCOPE: CPT | Performed by: EMERGENCY MEDICINE

## 2022-10-06 PROCEDURE — 85379 FIBRIN DEGRADATION QUANT: CPT | Performed by: EMERGENCY MEDICINE

## 2022-10-06 PROCEDURE — 96374 THER/PROPH/DIAG INJ IV PUSH: CPT

## 2022-10-06 PROCEDURE — 83036 HEMOGLOBIN GLYCOSYLATED A1C: CPT | Performed by: PHYSICIAN ASSISTANT

## 2022-10-06 PROCEDURE — 86803 HEPATITIS C AB TEST: CPT | Performed by: PHYSICIAN ASSISTANT

## 2022-10-06 PROCEDURE — 94761 N-INVAS EAR/PLS OXIMETRY MLT: CPT

## 2022-10-06 PROCEDURE — 99223 PR INITIAL HOSPITAL CARE,LEVL III: ICD-10-PCS | Mod: ,,, | Performed by: PHYSICIAN ASSISTANT

## 2022-10-06 PROCEDURE — 99291 PR CRITICAL CARE, E/M 30-74 MINUTES: ICD-10-PCS | Mod: ,,, | Performed by: EMERGENCY MEDICINE

## 2022-10-06 PROCEDURE — 93010 EKG 12-LEAD: ICD-10-PCS | Mod: ,,, | Performed by: INTERNAL MEDICINE

## 2022-10-06 PROCEDURE — 93005 ELECTROCARDIOGRAM TRACING: CPT

## 2022-10-06 PROCEDURE — 99223 1ST HOSP IP/OBS HIGH 75: CPT | Mod: ,,, | Performed by: PHYSICIAN ASSISTANT

## 2022-10-06 PROCEDURE — 99291 CRITICAL CARE FIRST HOUR: CPT | Mod: ,,, | Performed by: EMERGENCY MEDICINE

## 2022-10-06 PROCEDURE — 25500020 PHARM REV CODE 255: Performed by: EMERGENCY MEDICINE

## 2022-10-06 PROCEDURE — 25000003 PHARM REV CODE 250: Performed by: INTERNAL MEDICINE

## 2022-10-06 PROCEDURE — 84145 PROCALCITONIN (PCT): CPT

## 2022-10-06 RX ORDER — IBUPROFEN 200 MG
24 TABLET ORAL
Status: DISCONTINUED | OUTPATIENT
Start: 2022-10-06 | End: 2022-10-07 | Stop reason: HOSPADM

## 2022-10-06 RX ORDER — LABETALOL HCL 20 MG/4 ML
10 SYRINGE (ML) INTRAVENOUS
Status: COMPLETED | OUTPATIENT
Start: 2022-10-06 | End: 2022-10-06

## 2022-10-06 RX ORDER — ONDANSETRON 2 MG/ML
4 INJECTION INTRAMUSCULAR; INTRAVENOUS EVERY 8 HOURS PRN
Status: DISCONTINUED | OUTPATIENT
Start: 2022-10-06 | End: 2022-10-07 | Stop reason: HOSPADM

## 2022-10-06 RX ORDER — INSULIN ASPART 100 [IU]/ML
0-5 INJECTION, SOLUTION INTRAVENOUS; SUBCUTANEOUS
Status: DISCONTINUED | OUTPATIENT
Start: 2022-10-06 | End: 2022-10-07 | Stop reason: HOSPADM

## 2022-10-06 RX ORDER — SODIUM CHLORIDE 0.9 % (FLUSH) 0.9 %
10 SYRINGE (ML) INJECTION EVERY 12 HOURS PRN
Status: DISCONTINUED | OUTPATIENT
Start: 2022-10-06 | End: 2022-10-07 | Stop reason: HOSPADM

## 2022-10-06 RX ORDER — FUROSEMIDE 10 MG/ML
40 INJECTION INTRAMUSCULAR; INTRAVENOUS ONCE
Status: COMPLETED | OUTPATIENT
Start: 2022-10-06 | End: 2022-10-06

## 2022-10-06 RX ORDER — ONDANSETRON 8 MG/1
8 TABLET, ORALLY DISINTEGRATING ORAL EVERY 8 HOURS PRN
Status: DISCONTINUED | OUTPATIENT
Start: 2022-10-06 | End: 2022-10-07 | Stop reason: HOSPADM

## 2022-10-06 RX ORDER — GLUCAGON 1 MG
1 KIT INJECTION
Status: DISCONTINUED | OUTPATIENT
Start: 2022-10-06 | End: 2022-10-06 | Stop reason: SDUPTHER

## 2022-10-06 RX ORDER — LABETALOL 100 MG/1
100 TABLET, FILM COATED ORAL
Status: DISCONTINUED | OUTPATIENT
Start: 2022-10-06 | End: 2022-10-07 | Stop reason: HOSPADM

## 2022-10-06 RX ORDER — ENOXAPARIN SODIUM 100 MG/ML
40 INJECTION SUBCUTANEOUS EVERY 24 HOURS
Status: DISCONTINUED | OUTPATIENT
Start: 2022-10-06 | End: 2022-10-07 | Stop reason: HOSPADM

## 2022-10-06 RX ORDER — PREDNISONE 20 MG/1
40 TABLET ORAL DAILY
Status: DISCONTINUED | OUTPATIENT
Start: 2022-10-06 | End: 2022-10-06

## 2022-10-06 RX ORDER — SODIUM CHLORIDE 0.9 % (FLUSH) 0.9 %
10 SYRINGE (ML) INJECTION
Status: DISCONTINUED | OUTPATIENT
Start: 2022-10-06 | End: 2022-10-07 | Stop reason: HOSPADM

## 2022-10-06 RX ORDER — TALC
6 POWDER (GRAM) TOPICAL NIGHTLY PRN
Status: DISCONTINUED | OUTPATIENT
Start: 2022-10-06 | End: 2022-10-07 | Stop reason: HOSPADM

## 2022-10-06 RX ORDER — ATORVASTATIN CALCIUM 20 MG/1
40 TABLET, FILM COATED ORAL DAILY
Status: DISCONTINUED | OUTPATIENT
Start: 2022-10-07 | End: 2022-10-07 | Stop reason: HOSPADM

## 2022-10-06 RX ORDER — ACETAMINOPHEN 325 MG/1
650 TABLET ORAL EVERY 4 HOURS PRN
Status: DISCONTINUED | OUTPATIENT
Start: 2022-10-06 | End: 2022-10-07 | Stop reason: HOSPADM

## 2022-10-06 RX ORDER — HYDROCODONE BITARTRATE AND ACETAMINOPHEN 5; 325 MG/1; MG/1
1 TABLET ORAL
Status: COMPLETED | OUTPATIENT
Start: 2022-10-06 | End: 2022-10-06

## 2022-10-06 RX ORDER — IBUPROFEN 200 MG
16 TABLET ORAL
Status: DISCONTINUED | OUTPATIENT
Start: 2022-10-06 | End: 2022-10-06 | Stop reason: SDUPTHER

## 2022-10-06 RX ORDER — FUROSEMIDE 40 MG/1
40 TABLET ORAL DAILY
Qty: 30 TABLET | Refills: 0 | Status: SHIPPED | OUTPATIENT
Start: 2022-10-06 | End: 2022-10-07 | Stop reason: SDUPTHER

## 2022-10-06 RX ORDER — POTASSIUM CHLORIDE 20 MEQ/1
40 TABLET, EXTENDED RELEASE ORAL ONCE
Status: COMPLETED | OUTPATIENT
Start: 2022-10-06 | End: 2022-10-06

## 2022-10-06 RX ORDER — FUROSEMIDE 10 MG/ML
80 INJECTION INTRAMUSCULAR; INTRAVENOUS
Status: COMPLETED | OUTPATIENT
Start: 2022-10-06 | End: 2022-10-06

## 2022-10-06 RX ORDER — HYDRALAZINE HYDROCHLORIDE 20 MG/ML
10 INJECTION INTRAMUSCULAR; INTRAVENOUS EVERY 8 HOURS PRN
Status: DISCONTINUED | OUTPATIENT
Start: 2022-10-06 | End: 2022-10-07 | Stop reason: HOSPADM

## 2022-10-06 RX ORDER — NALOXONE HCL 0.4 MG/ML
0.02 VIAL (ML) INJECTION
Status: DISCONTINUED | OUTPATIENT
Start: 2022-10-06 | End: 2022-10-07 | Stop reason: HOSPADM

## 2022-10-06 RX ORDER — FUROSEMIDE 40 MG/1
40 TABLET ORAL DAILY
Status: DISCONTINUED | OUTPATIENT
Start: 2022-10-07 | End: 2022-10-06

## 2022-10-06 RX ORDER — LOSARTAN POTASSIUM 50 MG/1
50 TABLET ORAL DAILY
Status: DISCONTINUED | OUTPATIENT
Start: 2022-10-07 | End: 2022-10-07

## 2022-10-06 RX ORDER — ACETAMINOPHEN 325 MG/1
650 TABLET ORAL EVERY 8 HOURS PRN
Status: DISCONTINUED | OUTPATIENT
Start: 2022-10-06 | End: 2022-10-07 | Stop reason: HOSPADM

## 2022-10-06 RX ORDER — ACETAMINOPHEN 500 MG
1000 TABLET ORAL EVERY 8 HOURS PRN
Status: DISCONTINUED | OUTPATIENT
Start: 2022-10-06 | End: 2022-10-07 | Stop reason: HOSPADM

## 2022-10-06 RX ORDER — IBUPROFEN 200 MG
16 TABLET ORAL
Status: DISCONTINUED | OUTPATIENT
Start: 2022-10-06 | End: 2022-10-07 | Stop reason: HOSPADM

## 2022-10-06 RX ORDER — AMLODIPINE BESYLATE 5 MG/1
5 TABLET ORAL
Status: COMPLETED | OUTPATIENT
Start: 2022-10-06 | End: 2022-10-06

## 2022-10-06 RX ORDER — GLUCAGON 1 MG
1 KIT INJECTION
Status: DISCONTINUED | OUTPATIENT
Start: 2022-10-06 | End: 2022-10-07 | Stop reason: HOSPADM

## 2022-10-06 RX ORDER — FUROSEMIDE 10 MG/ML
40 INJECTION INTRAMUSCULAR; INTRAVENOUS
Status: DISCONTINUED | OUTPATIENT
Start: 2022-10-06 | End: 2022-10-07 | Stop reason: HOSPADM

## 2022-10-06 RX ORDER — IPRATROPIUM BROMIDE AND ALBUTEROL SULFATE 2.5; .5 MG/3ML; MG/3ML
3 SOLUTION RESPIRATORY (INHALATION) EVERY 4 HOURS PRN
Status: DISCONTINUED | OUTPATIENT
Start: 2022-10-06 | End: 2022-10-07 | Stop reason: HOSPADM

## 2022-10-06 RX ORDER — IBUPROFEN 200 MG
24 TABLET ORAL
Status: DISCONTINUED | OUTPATIENT
Start: 2022-10-06 | End: 2022-10-06 | Stop reason: SDUPTHER

## 2022-10-06 RX ORDER — IPRATROPIUM BROMIDE AND ALBUTEROL SULFATE 2.5; .5 MG/3ML; MG/3ML
3 SOLUTION RESPIRATORY (INHALATION)
Status: COMPLETED | OUTPATIENT
Start: 2022-10-06 | End: 2022-10-06

## 2022-10-06 RX ORDER — AMLODIPINE BESYLATE 5 MG/1
5 TABLET ORAL 2 TIMES DAILY
Status: DISCONTINUED | OUTPATIENT
Start: 2022-10-06 | End: 2022-10-07 | Stop reason: HOSPADM

## 2022-10-06 RX ORDER — LEVOTHYROXINE SODIUM 50 UG/1
50 TABLET ORAL
Status: DISCONTINUED | OUTPATIENT
Start: 2022-10-07 | End: 2022-10-07 | Stop reason: HOSPADM

## 2022-10-06 RX ADMIN — AMLODIPINE BESYLATE 5 MG: 5 TABLET ORAL at 08:10

## 2022-10-06 RX ADMIN — LABETALOL HYDROCHLORIDE 10 MG: 5 INJECTION, SOLUTION INTRAVENOUS at 09:10

## 2022-10-06 RX ADMIN — HYDRALAZINE HYDROCHLORIDE 10 MG: 20 INJECTION, SOLUTION INTRAMUSCULAR; INTRAVENOUS at 04:10

## 2022-10-06 RX ADMIN — IOHEXOL 75 ML: 350 INJECTION, SOLUTION INTRAVENOUS at 08:10

## 2022-10-06 RX ADMIN — ACETAMINOPHEN 650 MG: 325 TABLET ORAL at 04:10

## 2022-10-06 RX ADMIN — ACETAMINOPHEN 650 MG: 325 TABLET ORAL at 08:10

## 2022-10-06 RX ADMIN — POTASSIUM CHLORIDE 40 MEQ: 1500 TABLET, EXTENDED RELEASE ORAL at 08:10

## 2022-10-06 RX ADMIN — AMLODIPINE BESYLATE 5 MG: 5 TABLET ORAL at 09:10

## 2022-10-06 RX ADMIN — FUROSEMIDE 40 MG: 10 INJECTION, SOLUTION INTRAMUSCULAR; INTRAVENOUS at 05:10

## 2022-10-06 RX ADMIN — HYDROCODONE BITARTRATE AND ACETAMINOPHEN 1 TABLET: 5; 325 TABLET ORAL at 07:10

## 2022-10-06 RX ADMIN — FUROSEMIDE 80 MG: 10 INJECTION, SOLUTION INTRAMUSCULAR; INTRAVENOUS at 09:10

## 2022-10-06 RX ADMIN — IPRATROPIUM BROMIDE AND ALBUTEROL SULFATE 3 ML: 2.5; .5 SOLUTION RESPIRATORY (INHALATION) at 07:10

## 2022-10-06 RX ADMIN — IPRATROPIUM BROMIDE AND ALBUTEROL SULFATE 3 ML: 2.5; .5 SOLUTION RESPIRATORY (INHALATION) at 06:10

## 2022-10-06 RX ADMIN — LABETALOL HYDROCHLORIDE 100 MG: 100 TABLET, FILM COATED ORAL at 05:10

## 2022-10-06 NOTE — ED NOTES
14 floor RN called in regards to telebox location. RN states he sent to tube station 21. No tubes arrived per ED .

## 2022-10-06 NOTE — HPI
Olga Lopez is a 57 y.o. with HTN, HLD, T2DM, GERD, and COPD presents to the ED with complaints of shortness of breath and left flank pain. Patient reports 5 days ago she noticed a sharp pain under her left breast which is worse with deep inspiration. She states three days later she developed a non-productive cough and chest congestion. It wasn't until yesterday that she became acutely short of breath. At baseline, she is able to carry buckets of water from her kitchen to her garden however is now unable to do so. She has not noticed significant swelling in her lower extremities however does report mild abdominal extension and weight gain over the last few months. She denies fever, chills, nausea, vomiting, diarrhea, or chest pain. She reports a remote smoking history--- stopped 25 years ago. She reports a significant family history of cardiac disease and failure. She lives at home with her  and completes all ADL's independently.       ED: Per EDMD, she had mild JVD, trace pedal edema, and fine bibasilar wheeze.  Treated her with COPD with DuoNebs.  BNP slightly elevated, troponin baseline.  D-dimer slightly elevated but CTA shows no PE.  However does show pulmonary edema and bilateral pleural effusions.  Lasix 80 mg IV given.  Good diuresis at approximately 1 L.

## 2022-10-06 NOTE — SUBJECTIVE & OBJECTIVE
Past Medical History:   Diagnosis Date    COPD (chronic obstructive pulmonary disease)     GERD (gastroesophageal reflux disease)     Hypertension     Seasonal allergies        Past Surgical History:   Procedure Laterality Date    bladder lift  1994    BREAST SURGERY      EXCISION OF LESION OF THYROID      EYE SURGERY Right 1968    HYSTERECTOMY      OPEN REDUCTION AND INTERNAL FIXATION (ORIF) OF FRACTURE OF OLECRANON PROCESS OF ULNA Right 9/30/2020    Procedure: ORIF, FRACTURE, OLECRANON;  Surgeon: Luis Davidson MD;  Location: [facility];  Service: Orthopedics;  Laterality: Right;  too instruments, k-wire and cable     C-ARM    R Breast Mass Removal Right 02/27/2020    benign    REDUCTION OF BOTH BREASTS      REMOVAL OF HARDWARE FROM UPPER EXTREMITY  05/10/2021    right arm. removal orthopedic hardware     TUBAL LIGATION         Review of patient's allergies indicates:   Allergen Reactions    Vibramycin [doxycycline calcium] Hives    Codeine     Pcn [penicillins]     Sulfa (sulfonamide antibiotics)     Tetracyclines        Current Facility-Administered Medications on File Prior to Encounter   Medication    0.9%  NaCl infusion    lactated ringers infusion     Current Outpatient Medications on File Prior to Encounter   Medication Sig    acetaminophen (TYLENOL) 500 MG tablet Take 500 mg by mouth 2 (two) times daily.    amLODIPine (NORVASC) 2.5 MG tablet Take 5 mg by mouth 2 (two) times daily.     aspirin (ECOTRIN) 81 MG EC tablet Take 81 mg by mouth once daily.    atorvastatin (LIPITOR) 40 MG tablet Take 40 mg by mouth once daily.    blood sugar diagnostic Strp One test strip use 1 times a day to check blood glucose,  ICD-10: E11.9, compatible with insurance/glucometer    blood-glucose meter kit One glucometer, use to check 1 times a day.   ICD-10: E11.9,  Dispense machine covered by insurance    CETIRIZINE HCL (ZYRTEC ORAL) Take by mouth.    chlorthalidone (HYGROTEN) 25 MG Tab Take 25 mg by mouth once daily.     HYDROcodone-acetaminophen (NORCO) 5-325 mg per tablet Take 1 tablet by mouth every 4 (four) hours as needed for Pain.    labetaloL (NORMODYNE) 100 MG tablet Take 100 mg by mouth 3 (three) times daily with meals.    lancets Misc One lancets use 1 times a day to check blood glucose, ICD-10: E11.9    lancing device Misc One device, used to check blood glucose, ICD-10: E11.9    levothyroxine (SYNTHROID) 50 MCG tablet TAKE 1 TABLET BY MOUTH BEFORE BREAKFAST    losartan (COZAAR) 50 MG tablet Take 50 mg by mouth once daily.    metFORMIN (GLUCOPHAGE-XR) 500 MG ER 24hr tablet Take 1 tablet (500 mg total) by mouth 2 (two) times daily with meals.    omega-3 fatty acids/fish oil (FISH OIL-OMEGA-3 FATTY ACIDS) 300-1,000 mg capsule Take by mouth once daily.    omeprazole (PRILOSEC) 20 MG capsule Take 20 mg by mouth once daily.    ondansetron (ZOFRAN) 4 MG tablet Take 1 tablet (4 mg total) by mouth every 6 (six) hours.    potassium chloride 10 mEq/100 mL IVPB Inject 10 mEq into the vein once.    [DISCONTINUED] furosemide (LASIX) 40 MG tablet Take 40 mg by mouth once daily.     Family History    None       Tobacco Use    Smoking status: Never    Smokeless tobacco: Never   Substance and Sexual Activity    Alcohol use: No    Drug use: Never    Sexual activity: Not on file     Review of Systems   Constitutional:  Positive for activity change and unexpected weight change. Negative for appetite change, chills and fever.   HENT:  Positive for congestion. Negative for trouble swallowing.    Eyes:  Negative for photophobia and visual disturbance.   Respiratory:  Positive for cough and shortness of breath.    Cardiovascular:  Positive for chest pain (with deep breathing).   Gastrointestinal:  Positive for abdominal distention. Negative for abdominal pain, blood in stool, constipation, diarrhea, nausea and vomiting.   Endocrine: Negative for cold intolerance and heat intolerance.   Genitourinary:  Negative for difficulty urinating and  dysuria.   Musculoskeletal:  Negative for back pain and gait problem.   Skin:  Negative for color change and pallor.   Allergic/Immunologic: Negative for immunocompromised state.   Neurological:  Positive for headaches. Negative for dizziness, tremors, facial asymmetry, weakness and light-headedness.   Psychiatric/Behavioral:  Negative for agitation and behavioral problems.    Objective:     Vital Signs (Most Recent):  Temp: 98.4 °F (36.9 °C) (10/06/22 0628)  Pulse: 72 (10/06/22 1302)  Resp: 18 (10/06/22 1053)  BP: (!) 182/87 (10/06/22 1302)  SpO2: 97 % (10/06/22 1302)   Vital Signs (24h Range):  Temp:  [98.4 °F (36.9 °C)] 98.4 °F (36.9 °C)  Pulse:  [] 72  Resp:  [18-22] 18  SpO2:  [88 %-100 %] 97 %  BP: (182-242)/() 182/87     Weight: 84.4 kg (186 lb)  Body mass index is 35.14 kg/m².    Physical Exam  Vitals and nursing note reviewed.   Constitutional:       General: She is not in acute distress.     Appearance: Normal appearance. She is obese.   HENT:      Head: Normocephalic.      Nose: Nose normal.      Mouth/Throat:      Mouth: Mucous membranes are moist.   Eyes:      Extraocular Movements: Extraocular movements intact.      Pupils: Pupils are equal, round, and reactive to light.   Cardiovascular:      Rate and Rhythm: Normal rate and regular rhythm.      Heart sounds: No murmur heard.  Pulmonary:      Effort: Pulmonary effort is normal. No respiratory distress.      Breath sounds: Rales (bibasilalar, L>R) present.      Comments: NC in place  Abdominal:      General: Abdomen is flat. There is no distension.      Palpations: Abdomen is soft.      Tenderness: There is no abdominal tenderness.   Musculoskeletal:         General: No swelling. Normal range of motion.      Cervical back: Normal range of motion.      Right lower leg: No edema.      Left lower leg: No edema.   Skin:     General: Skin is warm and dry.      Capillary Refill: Capillary refill takes less than 2 seconds.      Coloration: Skin is  not jaundiced.   Neurological:      General: No focal deficit present.      Mental Status: She is alert.      Cranial Nerves: No cranial nerve deficit.      Sensory: No sensory deficit.   Psychiatric:         Mood and Affect: Mood normal.         Behavior: Behavior normal.         CRANIAL NERVES     CN III, IV, VI   Pupils are equal, round, and reactive to light.     Significant Labs: All pertinent labs within the past 24 hours have been reviewed.    Significant Imaging: I have reviewed all pertinent imaging results/findings within the past 24 hours.

## 2022-10-06 NOTE — ASSESSMENT & PLAN NOTE
- does not require supplemental oxygen at baseline  - pulse oximetry 88% on room air, improved with L nasal cannula on admission [goal 88%-92%]   - 6 minute walk test ordered   - CXR demonstrated Interstitial and alveolar opacities could relate to edema versus infectious inflammatory etiology. Bibasilar/retrocardiac opacities could relate to atelectasis, aspiration or pneumonia.  - procalcitonin ordered, Afebrile without leukocytosis   - started Prednisone 40 mg daily x 5 days  - not on maintenance regimen   - scheduled DuoNebs ordered, mucinex or airway clearance techniques if needed  - consider pulmonary rehabilitation at discharge

## 2022-10-06 NOTE — ED TRIAGE NOTES
Olga Lopez, a 57 y.o. female presents to the ED w/ complaint of SOB and left sided pain for 5 days. Denies chest pain, NV.    Triage note:  Chief Complaint   Patient presents with    Flank Pain    Shortness of Breath     Flank pain x 5 days 9/10. SOB since last night. 97% on room air. Shoulder surgery on 7/12/222. BP of 242/135 at triage.     Review of patient's allergies indicates:   Allergen Reactions    Vibramycin [doxycycline calcium] Hives    Codeine     Pcn [penicillins]     Sulfa (sulfonamide antibiotics)     Tetracyclines      Past Medical History:   Diagnosis Date    COPD (chronic obstructive pulmonary disease)     GERD (gastroesophageal reflux disease)     Hypertension     Seasonal allergies

## 2022-10-06 NOTE — ASSESSMENT & PLAN NOTE
Lab Results   Component Value Date    HGBA1C 6.5 (H) 06/30/2022     - controlled  - BG goal 140 - 180   - HOLD all PO anti-hyperglycemics while in the hospital   - home regimen: metformin   - inpatient regimen: 12 U detemir (low weight based dose)  - low dose SSI, ACHS acchuchecks   - Diabetic diet 2000 calories   - monitor for hypoglycemia

## 2022-10-06 NOTE — ED NOTES
Patient identifiers verified and correct for Olga CoffmanJed  LOC: The patient is awake, alert and aware of environment with an appropriate affect, the patient is oriented x 3 and speaking appropriately.   APPEARANCE: Patient appears comfortable and in no acute distress, patient is clean and well groomed.  SKIN: The skin is warm and dry, color consistent with ethnicity, patient has normal skin turgor and moist mucus membranes, skin intact, no breakdown or bruising noted.   MUSCULOSKELETAL: Patient moving all extremities spontaneously, no swelling noted.  RESPIRATORY: Airway is open and patent, respirations are spontaneous, patient has a normal effort and rate, no accessory muscle use noted, pt placed on continuous pulse ox with O2 sats noted at 97% on room air. Pt reports SOB and flank pain  CARDIAC: Pt placed on cardiac monitor. Patient has a normal rate and regular rhythm, no edema noted, capillary refill < 3 seconds.   GASTRO: Soft and non tender to palpation, no distention noted, normoactive bowel sounds present in all four quadrants. Pt states bowel movements have been regular.  : Pt denies any pain or frequency with urination.  NEURO: Pt opens eyes spontaneously, behavior appropriate to situation, follows commands, facial expression symmetrical, bilateral hand grasp equal and even, purposeful motor response noted, normal sensation in all extremities when touched with a finger.

## 2022-10-06 NOTE — ASSESSMENT & PLAN NOTE
Patient has not been identified as having heart failure at this time though there is a high clinical suspicion in the setting of elevated BNP, JVD and dyspnea.    - CHF is currently uncontrolled due to Rales/crackles on pulmonary exam.   - no prior TTE in system, will order    - home diuretic: furosemide 40 MG    - hospital diuresis: given furosemide 80MG x 1, will assess response and reorder if needed   - Place on fluid restriction of 1.5 L. Continue to stress to patient importance of self efficacy and  on diet for CHF.   - Monitor clinical status closely. Monitor on telemetry.   - Monitor strict Is&Os and daily weights.    - Last BNP reviewed- and noted below Recent Labs   Lab 10/06/22  0655   *   .

## 2022-10-06 NOTE — ED NOTES
I-STAT Chem-8+ Results:   Value Reference Range   Sodium 145 136-145 mmol/L   Potassium  3.2 3.5-5.1 mmol/L   Chloride 104  mmol/L   Ionized Calcium 1.10 1.06-1.42 mmol/L   CO2 (measured) 29 23-29 mmol/L   Glucose 124  mg/dL   BUN 5 6-30 mg/dL   Creatinine 0.5 0.5-1.4 mg/dL   Hematocrit 37 36-54%

## 2022-10-06 NOTE — H&P
[facility] - Emergency Dept  [facility] Medicine  History & Physical    Patient Name: Olga Lopez  MRN: [id number]  Patient Class: IP- Inpatient  Admission Date: 10/6/2022  Attending Physician: Hans Cervantes MD   Primary Care Provider: Ilana Moise MD         Patient information was obtained from patient and ER records.     Subjective:     Principal Problem:Acute respiratory failure with hypoxia    Chief Complaint:   Chief Complaint   Patient presents with    Flank Pain    Shortness of Breath     Flank pain x 5 days 9/10. SOB since last night. 97% on room air. Shoulder surgery on 7/12/222. BP of 242/135 at triage. Denies HA        HPI: Olga Lopez is a 57 y.o. with HTN, HLD, T2DM, GERD, and COPD presents to the ED with complaints of shortness of breath and left flank pain. Patient reports 5 days ago she noticed a sharp pain under her left breast which is worse with deep inspiration. She states three days later she developed a non-productive cough and chest congestion. It wasn't until yesterday that she became acutely short of breath. At baseline, she is able to carry buckets of water from her kitchen to her garden however is now unable to do so. She has not noticed significant swelling in her lower extremities however does report mild abdominal extension and weight gain over the last few months. She denies fever, chills, nausea, vomiting, diarrhea, or chest pain. She reports a remote smoking history--- stopped 25 years ago. She reports a significant family history of cardiac disease and failure. She lives at home with her  and completes all ADL's independently.       ED: Per EDMD, she had mild JVD, trace pedal edema, and fine bibasilar wheeze.  Treated her with COPD with DuoNebs.  BNP slightly elevated, troponin baseline.  D-dimer slightly elevated but CTA shows no PE.  However does show pulmonary edema and bilateral pleural effusions.  Lasix 80 mg IV given.  Good diuresis at  approximately 1 L.      Past Medical History:   Diagnosis Date    COPD (chronic obstructive pulmonary disease)     GERD (gastroesophageal reflux disease)     Hypertension     Seasonal allergies        Past Surgical History:   Procedure Laterality Date    bladder lift  1994    BREAST SURGERY      EXCISION OF LESION OF THYROID      EYE SURGERY Right 1968    HYSTERECTOMY      OPEN REDUCTION AND INTERNAL FIXATION (ORIF) OF FRACTURE OF OLECRANON PROCESS OF ULNA Right 9/30/2020    Procedure: ORIF, FRACTURE, OLECRANON;  Surgeon: Luis Davidson MD;  Location: [facility];  Service: Orthopedics;  Laterality: Right;  too instruments, k-wire and cable     C-ARM    R Breast Mass Removal Right 02/27/2020    benign    REDUCTION OF BOTH BREASTS      REMOVAL OF HARDWARE FROM UPPER EXTREMITY  05/10/2021    right arm. removal orthopedic hardware     TUBAL LIGATION         Review of patient's allergies indicates:   Allergen Reactions    Vibramycin [doxycycline calcium] Hives    Codeine     Pcn [penicillins]     Sulfa (sulfonamide antibiotics)     Tetracyclines        Current Facility-Administered Medications on File Prior to Encounter   Medication    0.9%  NaCl infusion    lactated ringers infusion     Current Outpatient Medications on File Prior to Encounter   Medication Sig    acetaminophen (TYLENOL) 500 MG tablet Take 500 mg by mouth 2 (two) times daily.    amLODIPine (NORVASC) 2.5 MG tablet Take 5 mg by mouth 2 (two) times daily.     aspirin (ECOTRIN) 81 MG EC tablet Take 81 mg by mouth once daily.    atorvastatin (LIPITOR) 40 MG tablet Take 40 mg by mouth once daily.    blood sugar diagnostic Strp One test strip use 1 times a day to check blood glucose,  ICD-10: E11.9, compatible with insurance/glucometer    blood-glucose meter kit One glucometer, use to check 1 times a day.   ICD-10: E11.9,  Dispense machine covered by insurance    CETIRIZINE HCL (ZYRTEC ORAL) Take by mouth.    chlorthalidone (HYGROTEN) 25 MG Tab Take 25 mg by  mouth once daily.    HYDROcodone-acetaminophen (NORCO) 5-325 mg per tablet Take 1 tablet by mouth every 4 (four) hours as needed for Pain.    labetaloL (NORMODYNE) 100 MG tablet Take 100 mg by mouth 3 (three) times daily with meals.    lancets Misc One lancets use 1 times a day to check blood glucose, ICD-10: E11.9    lancing device Misc One device, used to check blood glucose, ICD-10: E11.9    levothyroxine (SYNTHROID) 50 MCG tablet TAKE 1 TABLET BY MOUTH BEFORE BREAKFAST    losartan (COZAAR) 50 MG tablet Take 50 mg by mouth once daily.    metFORMIN (GLUCOPHAGE-XR) 500 MG ER 24hr tablet Take 1 tablet (500 mg total) by mouth 2 (two) times daily with meals.    omega-3 fatty acids/fish oil (FISH OIL-OMEGA-3 FATTY ACIDS) 300-1,000 mg capsule Take by mouth once daily.    omeprazole (PRILOSEC) 20 MG capsule Take 20 mg by mouth once daily.    ondansetron (ZOFRAN) 4 MG tablet Take 1 tablet (4 mg total) by mouth every 6 (six) hours.    potassium chloride 10 mEq/100 mL IVPB Inject 10 mEq into the vein once.    [DISCONTINUED] furosemide (LASIX) 40 MG tablet Take 40 mg by mouth once daily.     Family History    None       Tobacco Use    Smoking status: Never    Smokeless tobacco: Never   Substance and Sexual Activity    Alcohol use: No    Drug use: Never    Sexual activity: Not on file     Review of Systems   Constitutional:  Positive for activity change and unexpected weight change. Negative for appetite change, chills and fever.   HENT:  Positive for congestion. Negative for trouble swallowing.    Eyes:  Negative for photophobia and visual disturbance.   Respiratory:  Positive for cough and shortness of breath.    Cardiovascular:  Positive for chest pain (with deep breathing).   Gastrointestinal:  Positive for abdominal distention. Negative for abdominal pain, blood in stool, constipation, diarrhea, nausea and vomiting.   Endocrine: Negative for cold intolerance and heat intolerance.   Genitourinary:  Negative for difficulty  urinating and dysuria.   Musculoskeletal:  Negative for back pain and gait problem.   Skin:  Negative for color change and pallor.   Allergic/Immunologic: Negative for immunocompromised state.   Neurological:  Positive for headaches. Negative for dizziness, tremors, facial asymmetry, weakness and light-headedness.   Psychiatric/Behavioral:  Negative for agitation and behavioral problems.    Objective:     Vital Signs (Most Recent):  Temp: 98.4 °F (36.9 °C) (10/06/22 0628)  Pulse: 72 (10/06/22 1302)  Resp: 18 (10/06/22 1053)  BP: (!) 182/87 (10/06/22 1302)  SpO2: 97 % (10/06/22 1302)   Vital Signs (24h Range):  Temp:  [98.4 °F (36.9 °C)] 98.4 °F (36.9 °C)  Pulse:  [] 72  Resp:  [18-22] 18  SpO2:  [88 %-100 %] 97 %  BP: (182-242)/() 182/87     Weight: 84.4 kg (186 lb)  Body mass index is 35.14 kg/m².    Physical Exam  Vitals and nursing note reviewed.   Constitutional:       General: She is not in acute distress.     Appearance: Normal appearance. She is obese.   HENT:      Head: Normocephalic.      Nose: Nose normal.      Mouth/Throat:      Mouth: Mucous membranes are moist.   Eyes:      Extraocular Movements: Extraocular movements intact.      Pupils: Pupils are equal, round, and reactive to light.   Cardiovascular:      Rate and Rhythm: Normal rate and regular rhythm.      Heart sounds: No murmur heard.  Pulmonary:      Effort: Pulmonary effort is normal. No respiratory distress.      Breath sounds: Rales (bibasilalar, L>R) present.      Comments: NC in place  Abdominal:      General: Abdomen is flat. There is no distension.      Palpations: Abdomen is soft.      Tenderness: There is no abdominal tenderness.   Musculoskeletal:         General: No swelling. Normal range of motion.      Cervical back: Normal range of motion.      Right lower leg: No edema.      Left lower leg: No edema.   Skin:     General: Skin is warm and dry.      Capillary Refill: Capillary refill takes less than 2 seconds.       Coloration: Skin is not jaundiced.   Neurological:      General: No focal deficit present.      Mental Status: She is alert.      Cranial Nerves: No cranial nerve deficit.      Sensory: No sensory deficit.   Psychiatric:         Mood and Affect: Mood normal.         Behavior: Behavior normal.         CRANIAL NERVES     CN III, IV, VI   Pupils are equal, round, and reactive to light.     Significant Labs: All pertinent labs within the past 24 hours have been reviewed.    Significant Imaging: I have reviewed all pertinent imaging results/findings within the past 24 hours.    Assessment/Plan:     * Acute respiratory failure with hypoxia  Atelectasis  Patient with Hypoxic Respiratory failure which is Acute.  she is not on home oxygen. Supplemental oxygen was provided and noted- 2-3L   Signs/symptoms of respiratory failure include- respiratory distress. Contributing diagnoses includes - CHF, COPD and Obesity Hypoventilation Labs and images were reviewed. Patient Has not had a recent ABG. Will treat underlying causes and adjust management of respiratory failure as follows     - diuretics, see below   - see COPD     COPD (chronic obstructive pulmonary disease)  Low suspicion for acute exacerbation, suspect presentation consistent with cardiac cause  - does not require supplemental oxygen at baseline  - pulse oximetry 88% on room air, improved with L nasal cannula on admission [goal 88%-92%]   - 6 minute walk test ordered   - CXR demonstrated Interstitial and alveolar opacities could relate to edema versus infectious inflammatory etiology. Bibasilar/retrocardiac opacities could relate to atelectasis, aspiration or pneumonia.  - procalcitonin ordered, Afebrile without leukocytosis   - consider Prednisone 40 mg daily x 5 days if no improvement with diuretics   - not on maintenance regimen   - scheduled DuoNebs ordered, mucinex or airway clearance techniques if needed  - consider pulmonary rehabilitation at discharge     Acute  congestive heart failure  Patient has not been identified as having heart failure at this time though there is a high clinical suspicion in the setting of elevated BNP, JVD and dyspnea.    - CHF is currently uncontrolled due to Rales/crackles on pulmonary exam.   - no prior TTE in system, will order    - home diuretic: furosemide 40 MG    - hospital diuresis: given furosemide 80MG x 1, will assess response and reorder if needed   - Place on fluid restriction of 1.5 L. Continue to stress to patient importance of self efficacy and  on diet for CHF.   - Monitor clinical status closely. Monitor on telemetry.   - Monitor strict Is&Os and daily weights.    - Last BNP reviewed- and noted below Recent Labs   Lab 10/06/22  0655   *   .      Controlled type 2 diabetes mellitus without complication, without long-term current use of insulin  Lab Results   Component Value Date    HGBA1C 6.5 (H) 06/30/2022     - controlled  - BG goal 140 - 180   - HOLD all PO anti-hyperglycemics while in the hospital   - home regimen: metformin   - inpatient regimen: 12 U detemir (low weight based dose)  - low dose SSI, ACHS acchuchecks   - Diabetic diet 2000 calories   - monitor for hypoglycemia    Hyperlipidemia  - atorvastatin (LIPITOR) 40 MG tablet daily     Postoperative hypothyroidism  - levothyroxine (SYNTHROID) 50 MCG tablet daily     Hypertension  - amLODIPine (NORVASC) 2.5 MG tablet daily   - labetaloL (NORMODYNE) 100 MG tablet TID w/ meals   - losartan (COZAAR) 50 MG tablet daily     VTE Risk Mitigation (From admission, onward)           Ordered     enoxaparin injection 40 mg  Daily         10/06/22 1407     IP VTE HIGH RISK PATIENT  Once         10/06/22 1407     Place sequential compression device  Until discontinued         10/06/22 1407     Place KADI hose  Until discontinued         10/06/22 1407                       Yamilex Harkins PA-C  Department of Hospital Medicine   [facility] Dept

## 2022-10-06 NOTE — ED PROVIDER NOTES
Encounter Date: 10/6/2022       History     Chief Complaint   Patient presents with    Flank Pain    Shortness of Breath     Flank pain x 5 days 9/10. SOB since last night. 97% on room air. Shoulder surgery on 7/12/222. BP of 242/135 at triage. Denies SORENSEN     HPI  Olga Lopez is a 57-year-old female with a history of COPD, GERD, hypertension and seasonal allergies presenting with left-sided flank, back and rib pain associated with shortness of breath for 5 days.  Her symptoms initially began a week ago and been worsening with associated shortness of breath.  She recently had shoulder surgery/right arm surgery.  She denies any fevers, chills, nausea, vomiting, lightheadedness, dizziness, falls or trauma.  She denies any anterior chest pain.  Her pain is worse with inspiration at times.  She denies any associated arm pain, jaw pain, hematochezia, melena, hemoptysis or hematemesis.  She is currently not on any anticoagulation.  She denies any dyspnea on exertion.  She denies any unilateral leg swelling, leg pain, calf pain, hip pain or diarrhea.  She denies any dysuria.    Review of patient's allergies indicates:   Allergen Reactions    Vibramycin [doxycycline calcium] Hives    Codeine     Pcn [penicillins]     Sulfa (sulfonamide antibiotics)     Tetracyclines      Past Medical History:   Diagnosis Date    COPD (chronic obstructive pulmonary disease)     GERD (gastroesophageal reflux disease)     Hypertension     Seasonal allergies      Past Surgical History:   Procedure Laterality Date    bladder lift  1994    BREAST SURGERY      EXCISION OF LESION OF THYROID      EYE SURGERY Right 1968    HYSTERECTOMY      OPEN REDUCTION AND INTERNAL FIXATION (ORIF) OF FRACTURE OF OLECRANON PROCESS OF ULNA Right 9/30/2020    Procedure: ORIF, FRACTURE, OLECRANON;  Surgeon: Luis Davidson MD;  Location: [facility] OR;  Service: Orthopedics;  Laterality: Right;  too instruments, k-wire and cable     C-ARM    R Breast Mass  Removal Right 02/27/2020    benign    REDUCTION OF BOTH BREASTS      REMOVAL OF HARDWARE FROM UPPER EXTREMITY  05/10/2021    right arm. removal orthopedic hardware     TUBAL LIGATION       No family history on file.  Social History     Tobacco Use    Smoking status: Never    Smokeless tobacco: Never   Substance Use Topics    Alcohol use: No    Drug use: Never     Review of Systems   Constitutional:  Negative for chills and fever.   HENT:  Negative for congestion and sore throat.    Eyes:  Negative for photophobia and visual disturbance.   Respiratory:  Positive for shortness of breath. Negative for cough, chest tightness and wheezing.    Cardiovascular:  Positive for chest pain. Negative for palpitations and leg swelling.   Gastrointestinal:  Negative for abdominal pain, nausea and vomiting.   Endocrine: Negative for polyphagia and polyuria.   Genitourinary:  Positive for flank pain. Negative for dysuria, pelvic pain and urgency.   Musculoskeletal:  Positive for back pain.   Skin:  Negative for color change and wound.   Neurological:  Negative for facial asymmetry, light-headedness and numbness.   Psychiatric/Behavioral:  Negative for confusion. The patient is not nervous/anxious.      Physical Exam     Initial Vitals [10/06/22 0628]   BP Pulse Resp Temp SpO2   (!) 242/135 100 20 98.4 °F (36.9 °C) 97 %      MAP       --         Physical Exam    Nursing note and vitals reviewed.    Gen/Constitutional: Interactive. No acute distress  Head: Normocephalic, Atraumatic  Neck: supple, no masses or LAD, positive JVD  Eyes: PERRLA, conjunctiva clear  Ears, Nose and Throat: No rhinorrhea or stridor.  Cardiac:  Tachycardic heart rate, Reg Rhythm, No murmur  Pulmonary:  Fine wheezes, bibasilar rales  GI: Abdomen soft, non-tender, non-distended; no rebound or guarding  : No CVA tenderness.  Musculoskeletal: Extremities warm, well perfused, no erythema, mild edema  Right arm:  Splint and brace in place, neurovascular intact,  sensory intact to light touch, 2+ distal pulses  Skin: No rashes, cyanosis or jaundice.  Neuro: Alert and Oriented x 3; No focal motor or sensory deficits.    Psych: Normal affect      ED Course   Critical Care    Date/Time: 10/6/2022 8:15 AM  Performed by: Clem Gaspar DO  Authorized by: Clem Gaspar DO   Direct patient critical care time: 15 minutes  Additional history critical care time: 15 minutes  Ordering / reviewing critical care time: 25 minutes  Documentation critical care time: 8 minutes  Consulting other physicians critical care time: 5 minutes  Total critical care time (exclusive of procedural time) : 68 minutes  Critical care was necessary to treat or prevent imminent or life-threatening deterioration of the following conditions: cardiac failure and respiratory failure.  Critical care was time spent personally by me on the following activities: blood draw for specimens, development of treatment plan with patient or surrogate, discussions with consultants, evaluation of patient's response to treatment, examination of patient, obtaining history from patient or surrogate, ordering and performing treatments and interventions, ordering and review of laboratory studies, ordering and review of radiographic studies, pulse oximetry, re-evaluation of patient's condition and review of old charts.      Labs Reviewed   CBC W/ AUTO DIFFERENTIAL - Abnormal; Notable for the following components:       Result Value    Hemoglobin 11.4 (*)     MCV 81 (*)     MCH 22.9 (*)     MCHC 28.4 (*)     RDW 15.8 (*)     All other components within normal limits   COMPREHENSIVE METABOLIC PANEL - Abnormal; Notable for the following components:    Glucose 123 (*)     Total Protein 9.0 (*)     Albumin 3.4 (*)     All other components within normal limits   B-TYPE NATRIURETIC PEPTIDE - Abnormal; Notable for the following components:     (*)     All other components within normal limits   D DIMER, QUANTITATIVE - Abnormal;  Notable for the following components:    D-Dimer 1.90 (*)     All other components within normal limits   URINALYSIS, REFLEX TO URINE CULTURE - Abnormal; Notable for the following components:    Color, UA Colorless (*)     Specific Gravity, UA >1.030 (*)     All other components within normal limits    Narrative:     Specimen Source->Urine   ISTAT PROCEDURE - Abnormal; Notable for the following components:    POC Glucose 124 (*)     POC BUN 5 (*)     POC Potassium 3.2 (*)     All other components within normal limits   HIV 1 / 2 ANTIBODY    Narrative:     Release to patient->Immediate   HEPATITIS C ANTIBODY    Narrative:     Release to patient->Immediate   TROPONIN I   PROCALCITONIN   ISTAT CHEM8   POCT GLUCOSE MONITORING CONTINUOUS   POCT GLUCOSE MONITORING CONTINUOUS     EKG Readings: (Independently Interpreted)   Previous EKG: Compared with most recent EKG Rhythm: Normal Sinus Rhythm. Heart Rate: 87. Axis: Left Axis Deviation.   ECG Results              EKG 12-lead (Final result)  Result time 10/06/22 09:34:10      Final result by Interface, Lab In [facility] (10/06/22 09:34:10)                   Narrative:    Test Reason : R07.9,    Vent. Rate : 087 BPM     Atrial Rate : 087 BPM     P-R Int : 118 ms          QRS Dur : 084 ms      QT Int : 380 ms       P-R-T Axes : 055 -36 076 degrees     QTc Int : 457 ms    Normal sinus rhythm  Left axis deviation  Abnormal ECG  When compared with ECG of 26-APR-2021 13:08,  No significant change was found  Confirmed by Gigi WRIGHT MD ([id number]) on 10/6/2022 9:34:03 AM    Referred By: System System           Confirmed By:Gigi WRIGHT MD                                  Imaging Results              CTA Chest Non-Coronary (PE Studies) (Final result)  Result time 10/06/22 08:46:47      Final result by Valeriy Holt MD (10/06/22 08:46:47)                   Impression:      1. Examination considered diagnostic to the proximal segmental pulmonary arterial level without convincing evidence  of acute pulmonary embolism.  2. Patchy areas of solid and ground-glass airspace consolidation concerning for multifocal infectious versus noninfectious inflammatory process.  Imaging follow-up to resolution recommended.  3. Nonspecific bilateral axillary and subpectoral adenopathy.  Findings may be reactive, however metastatic disease or process such as lymphoma difficult to exclude.  Clinical correlation recommended.  Additionally, given the involvement subpectoral nodes, recommend correlation with any history of recent mammographic evaluation.  If none has been performed, consider breast imaging follow-up.  4. Additional details, as provided in the body report.      Electronically signed by: Valeriy Holt  Date:    10/06/2022  Time:    08:46               Narrative:    EXAMINATION:  CTA CHEST NON CORONARY (PE STUDIES)    CLINICAL HISTORY:  Pulmonary embolism (PE) suspected, positive D-dimer;    TECHNIQUE:  Low dose axial images, sagittal and coronal reformations were obtained from the thoracic inlet to the lung bases following the IV administration of 75 mL of Omnipaque 350.  Contrast timing was optimized to evaluate the pulmonary arteries.  MIP images were performed.    COMPARISON:  Chest radiograph 10/06/2022.    FINDINGS:  Exam quality: Examination considered diagnostic to the proximal segmental pulmonary arterial level.    Pulmonary embolism: No acute or chronic pulmonary embolism.    Central pulmonary arteries: Normal caliber.    Aorta: Normal caliber.    Heart: At least mild cardiac enlargement suggested.    Coronary arteries: Limited assessment.    Pericardium: Normal. No effusion, thickening, or calcification.    Support tubes and lines: None.    Base of neck/thyroid: Normal.    Lymph nodes: Multiple prominent number and enlarged axillary and subpectoral lymph nodes are noted, slightly eccentric to the right.  For reference, a right subpectoral node measures 17 mm short axis (series 2, image 109).  Left  subpectoral node (series 2, image 87) measures 12-13 mm short axis.    Esophagus: Normal.    Pleura: No effusion, thickening, or calcification.    Upper abdomen: Unremarkable    Body wall: Unremarkable    Airways: Central airways appear patent.    Lungs: Assessment the lungs substantially limited by respiratory motion.  Dependent atelectasis is noted.  Patchy areas of solid and ground-glass airspace consolidation predominantly involving the left greater than right lower lobes and lingula are noted.    Bones: Surgical sequela involving the right humerus partially imaged.  No definite acute osseous abnormality appreciated.  Scoliotic curvature of the thoracolumbar spine.                                       X-Ray Chest AP Portable (Final result)  Result time 10/06/22 07:42:52      Final result by Valeriy Holt MD (10/06/22 07:42:52)                   Impression:      Interstitial and alveolar opacities could relate to edema versus infectious inflammatory etiology.    Bibasilar/retrocardiac opacities could relate to atelectasis, aspiration or pneumonia.    Query small pleural effusions.      Electronically signed by: Valeriy Holt  Date:    10/06/2022  Time:    07:42               Narrative:    EXAMINATION:  XR CHEST AP PORTABLE    CLINICAL HISTORY:  Chest Pain;    TECHNIQUE:  Single frontal view of the chest was performed.    COMPARISON:  Chest radiograph performed 06/30/2022.    FINDINGS:  Monitoring leads overlie the chest.  Enlarged cardiac silhouette.  Prominence and indistinctness of the central pulmonary vasculature.    Patchy interstitial and alveolar opacities.  Dense bibasilar retrocardiac opacities.  Question small pleural effusions.    No definite pneumothorax.    No acute findings in the visualized abdomen.  Osseous soft tissue structures appear without definite acute abnormality.  Postoperative sequela in the proximal right humerus.                                    X-Rays:   Independently  Interpreted Readings:   Chest X-Ray: Cardiomegaly present. Bilateral pulmonary edema, small pleural effusions, no pneumothorax or free air   Medications   sodium chloride 0.9% flush 10 mL (has no administration in time range)   melatonin tablet 6 mg (has no administration in time range)   sodium chloride 0.9% flush 10 mL (has no administration in time range)   naloxone 0.4 mg/mL injection 0.02 mg (has no administration in time range)   glucose chewable tablet 16 g (has no administration in time range)   glucose chewable tablet 24 g (has no administration in time range)   glucagon (human recombinant) injection 1 mg (has no administration in time range)   enoxaparin injection 40 mg (40 mg Subcutaneous Not Given 10/6/22 1700)   acetaminophen tablet 1,000 mg (has no administration in time range)   ondansetron disintegrating tablet 8 mg (has no administration in time range)   ondansetron injection 4 mg (has no administration in time range)   acetaminophen tablet 650 mg ( Oral Canceled Entry 10/6/22 1620)   acetaminophen tablet 650 mg (650 mg Oral Given 10/6/22 2021)   albuterol-ipratropium 2.5 mg-0.5 mg/3 mL nebulizer solution 3 mL (has no administration in time range)   dextrose 10% bolus 125 mL (has no administration in time range)   dextrose 10% bolus 250 mL (has no administration in time range)   hydrALAZINE injection 10 mg (10 mg Intravenous Given 10/6/22 1616)   amLODIPine tablet 5 mg (5 mg Oral Given 10/6/22 2020)   atorvastatin tablet 40 mg (has no administration in time range)   labetaloL tablet 100 mg (100 mg Oral Given 10/6/22 1751)   levothyroxine tablet 50 mcg (has no administration in time range)   losartan tablet 50 mg (has no administration in time range)   furosemide injection 40 mg (40 mg Intravenous Not Given 10/6/22 1900)   dextrose 10% bolus 125 mL (has no administration in time range)   dextrose 10% bolus 250 mL (has no administration in time range)   insulin aspart U-100 pen 0-5 Units (has no  administration in time range)   albuterol-ipratropium 2.5 mg-0.5 mg/3 mL nebulizer solution 3 mL (3 mLs Nebulization Given by Other 10/6/22 0705)   HYDROcodone-acetaminophen 5-325 mg per tablet 1 tablet (1 tablet Oral Given 10/6/22 0736)   iohexoL (OMNIPAQUE 350) injection 75 mL (75 mLs Intravenous Given 10/6/22 0822)   amLODIPine tablet 5 mg (5 mg Oral Given 10/6/22 0927)   labetalol 20 mg/4 mL (5 mg/mL) IV syring (10 mg Intravenous Given 10/6/22 0926)   furosemide injection 80 mg (80 mg Intravenous Given 10/6/22 0926)   furosemide injection 40 mg (40 mg Intravenous Given 10/6/22 1758)   potassium chloride SA CR tablet 40 mEq (40 mEq Oral Given 10/6/22 2020)     Medical Decision Making:   History:   Old Medical Records: I decided to obtain old medical records.  Initial Assessment:   Olga Lopez is a 57-year-old female with a history of COPD, GERD, hypertension and seasonal allergies presenting with left-sided flank, back and rib pain associated with shortness of breath for 5 days.  Differential Diagnosis:   COPD, pneumonia, PE, ACS, pleural effusion, pneumothorax, CHF  Independently Interpreted Test(s):   I have ordered and independently interpreted X-rays - see prior notes.  I have ordered and independently interpreted EKG Reading(s) - see prior notes  Clinical Tests:   Lab Tests: Ordered and Reviewed  Radiological Study: Ordered and Reviewed  Medical Tests: Ordered and Reviewed                 Emergent evaluation of patient presenting with shortness of breath, dyspnea on exertion and left-sided thoracic back pain.  She is currently afebrile, hypertensive with tachycardia and without hypoxia or tachypnea.  Physical exam findings with fine bibasilar wheezing, bibasilar rales, positive for JVD and lower extremity edema that is trace.  She has increased work of breathing with any activity.  ECG obtained with no signs of ischemia or STEMI on my read.  Placed on cardiac and telemetry monitoring including  pulse oximetry.  Her oxygen dropped to 90% with movement in bed.  When ambulated she drops to 88%.  She was given IV Lasix 80 mg for concern for volume overload as she is currently on p.o. Lasix 40 mg at home.  She was able to diurese approximately 1 L but continues to remain symptomatic.  D-dimer elevated, BNP slightly elevated.  Chest x-ray shows pulmonary edema and slight pleural effusions bilaterally on my read.  Given elevated D-dimer, concern for PE is present.  Patient is postoperative from right arm surgery and high risk for PE.  CTA shows no obvious PE within the lobar, segmental or main pulmonary trunk on my read.  This was verified with Radiology read.  Attempted to ambulate the patient again in she drops oxygen saturations to 86-88% with tachycardia and increased work of breathing.  Will continue to manage blood pressure with antihypertensive, diuresing.  Discussed case with hospital medicine will admit for inpatient management of new CHF, heart failure type symptoms.  Patient remained hemodynamically stable.  She required oxygen support.  Do not suspect pneumonia process.  Please see critical care note for critical care time.    Complexity:  Critical Care       Clinical Impression:   Final diagnoses:  [I10] Hypertension  [R06.02] SOB (shortness of breath) (Primary)  [M54.6] Acute left-sided thoracic back pain  [R06.09] Dyspnea on exertion  [J81.0] Acute pulmonary edema        ED Disposition Condition    Admit Stable                 Clem Gaspar DO, FAAEM  Emergency Staff Physician   Dept of Emergency Medicine   [facility]  Spectralink: [id number]        Disclaimer: This note has been generated using voice-recognition software. There may be typographical errors that have been missed during proof-reading.       Clem Gaspar DO  10/06/22 3571

## 2022-10-06 NOTE — ASSESSMENT & PLAN NOTE
Atelectasis  Patient with Hypoxic Respiratory failure which is Acute.  she is not on home oxygen. Supplemental oxygen was provided and noted- 2-3L   Signs/symptoms of respiratory failure include- respiratory distress. Contributing diagnoses includes - CHF, COPD and Obesity Hypoventilation Labs and images were reviewed. Patient Has not had a recent ABG. Will treat underlying causes and adjust management of respiratory failure as follows     - diuretics, see below   - see COPD

## 2022-10-06 NOTE — ASSESSMENT & PLAN NOTE
Low suspicion for acute exacerbation, suspect presentation consistent with cardiac cause  - does not require supplemental oxygen at baseline  - pulse oximetry 88% on room air, improved with L nasal cannula on admission [goal 88%-92%]   - 6 minute walk test ordered   - CXR demonstrated Interstitial and alveolar opacities could relate to edema versus infectious inflammatory etiology. Bibasilar/retrocardiac opacities could relate to atelectasis, aspiration or pneumonia.  - procalcitonin ordered, Afebrile without leukocytosis   - consider Prednisone 40 mg daily x 5 days if no improvement with diuretics   - not on maintenance regimen   - scheduled DuoNebs ordered, mucinex or airway clearance techniques if needed  - consider pulmonary rehabilitation at discharge

## 2022-10-06 NOTE — ASSESSMENT & PLAN NOTE
- amLODIPine (NORVASC) 2.5 MG tablet daily   - labetaloL (NORMODYNE) 100 MG tablet TID w/ meals   - losartan (COZAAR) 50 MG tablet daily

## 2022-10-06 NOTE — ED NOTES
Assumed care of the patient. Report received from ALVARADO Ríos. Pt in hospital gown, side rails up X2, bed low and locked, and call light is placed within reach. No family/visitors at bedside at this time. Pt denies any complaints or needs.

## 2022-10-07 VITALS
OXYGEN SATURATION: 95 % | RESPIRATION RATE: 20 BRPM | HEART RATE: 102 BPM | SYSTOLIC BLOOD PRESSURE: 179 MMHG | HEIGHT: 61 IN | TEMPERATURE: 98 F | WEIGHT: 186 LBS | DIASTOLIC BLOOD PRESSURE: 95 MMHG | BODY MASS INDEX: 35.12 KG/M2

## 2022-10-07 LAB
ANION GAP SERPL CALC-SCNC: 14 MMOL/L (ref 8–16)
ASCENDING AORTA: 2.52 CM
AV INDEX (PROSTH): 0.76
AV MEAN GRADIENT: 3 MMHG
AV PEAK GRADIENT: 6 MMHG
AV VALVE AREA: 2.89 CM2
AV VELOCITY RATIO: 0.68
BASOPHILS # BLD AUTO: 0.06 K/UL (ref 0–0.2)
BASOPHILS NFR BLD: 0.8 % (ref 0–1.9)
BSA FOR ECHO PROCEDURE: 1.91 M2
BUN SERPL-MCNC: 9 MG/DL (ref 6–20)
CALCIUM SERPL-MCNC: 9.5 MG/DL (ref 8.7–10.5)
CHLORIDE SERPL-SCNC: 102 MMOL/L (ref 95–110)
CO2 SERPL-SCNC: 24 MMOL/L (ref 23–29)
CREAT SERPL-MCNC: 0.7 MG/DL (ref 0.5–1.4)
CV ECHO LV RWT: 0.52 CM
DIFFERENTIAL METHOD: ABNORMAL
DOP CALC AO PEAK VEL: 1.22 M/S
DOP CALC AO VTI: 20.84 CM
DOP CALC LVOT AREA: 3.8 CM2
DOP CALC LVOT DIAMETER: 2.2 CM
DOP CALC LVOT PEAK VEL: 0.83 M/S
DOP CALC LVOT STROKE VOLUME: 60.22 CM3
DOP CALCLVOT PEAK VEL VTI: 15.85 CM
E WAVE DECELERATION TIME: 270.06 MSEC
E/A RATIO: 0.93
E/E' RATIO: 19.8 M/S
ECHO LV POSTERIOR WALL: 1.2 CM (ref 0.6–1.1)
EJECTION FRACTION: 55 %
EOSINOPHIL # BLD AUTO: 0.1 K/UL (ref 0–0.5)
EOSINOPHIL NFR BLD: 1.1 % (ref 0–8)
ERYTHROCYTE [DISTWIDTH] IN BLOOD BY AUTOMATED COUNT: 15.9 % (ref 11.5–14.5)
EST. GFR  (NO RACE VARIABLE): >60 ML/MIN/1.73 M^2
FRACTIONAL SHORTENING: 30 % (ref 28–44)
GLUCOSE SERPL-MCNC: 137 MG/DL (ref 70–110)
HCT VFR BLD AUTO: 41.6 % (ref 37–48.5)
HGB BLD-MCNC: 11.7 G/DL (ref 12–16)
IMM GRANULOCYTES # BLD AUTO: 0.02 K/UL (ref 0–0.04)
IMM GRANULOCYTES NFR BLD AUTO: 0.3 % (ref 0–0.5)
INTERVENTRICULAR SEPTUM: 1.3 CM (ref 0.6–1.1)
LA MAJOR: 4.71 CM
LA MINOR: 5.2 CM
LA WIDTH: 3.29 CM
LEFT ATRIUM SIZE: 4.03 CM
LEFT ATRIUM VOLUME INDEX: 30.4 ML/M2
LEFT ATRIUM VOLUME: 55.71 CM3
LEFT INTERNAL DIMENSION IN SYSTOLE: 3.27 CM (ref 2.1–4)
LEFT VENTRICLE DIASTOLIC VOLUME INDEX: 54.59 ML/M2
LEFT VENTRICLE DIASTOLIC VOLUME: 99.9 ML
LEFT VENTRICLE MASS INDEX: 121 G/M2
LEFT VENTRICLE SYSTOLIC VOLUME INDEX: 23.7 ML/M2
LEFT VENTRICLE SYSTOLIC VOLUME: 43.28 ML
LEFT VENTRICULAR INTERNAL DIMENSION IN DIASTOLE: 4.65 CM (ref 3.5–6)
LEFT VENTRICULAR MASS: 221.06 G
LV LATERAL E/E' RATIO: 19.8 M/S
LV SEPTAL E/E' RATIO: 19.8 M/S
LYMPHOCYTES # BLD AUTO: 1.7 K/UL (ref 1–4.8)
LYMPHOCYTES NFR BLD: 24.2 % (ref 18–48)
MAGNESIUM SERPL-MCNC: 1.9 MG/DL (ref 1.6–2.6)
MCH RBC QN AUTO: 23.4 PG (ref 27–31)
MCHC RBC AUTO-ENTMCNC: 28.1 G/DL (ref 32–36)
MCV RBC AUTO: 83 FL (ref 82–98)
MONOCYTES # BLD AUTO: 0.7 K/UL (ref 0.3–1)
MONOCYTES NFR BLD: 9.8 % (ref 4–15)
MV PEAK A VEL: 1.06 M/S
MV PEAK E VEL: 0.99 M/S
MV STENOSIS PRESSURE HALF TIME: 78.32 MS
MV VALVE AREA P 1/2 METHOD: 2.81 CM2
NEUTROPHILS # BLD AUTO: 4.5 K/UL (ref 1.8–7.7)
NEUTROPHILS NFR BLD: 63.8 % (ref 38–73)
NRBC BLD-RTO: 0 /100 WBC
PHOSPHATE SERPL-MCNC: 2.8 MG/DL (ref 2.7–4.5)
PLATELET # BLD AUTO: 354 K/UL (ref 150–450)
PMV BLD AUTO: 12 FL (ref 9.2–12.9)
POCT GLUCOSE: 110 MG/DL (ref 70–110)
POCT GLUCOSE: 113 MG/DL (ref 70–110)
POCT GLUCOSE: 156 MG/DL (ref 70–110)
POCT GLUCOSE: 187 MG/DL (ref 70–110)
POTASSIUM SERPL-SCNC: 3.2 MMOL/L (ref 3.5–5.1)
RA MAJOR: 4.24 CM
RA PRESSURE: 8 MMHG
RA WIDTH: 4.11 CM
RBC # BLD AUTO: 5.01 M/UL (ref 4–5.4)
RIGHT VENTRICULAR END-DIASTOLIC DIMENSION: 3.5 CM
RV TISSUE DOPPLER FREE WALL SYSTOLIC VELOCITY 1 (APICAL 4 CHAMBER VIEW): 17 CM/S
SINUS: 2.94 CM
SODIUM SERPL-SCNC: 140 MMOL/L (ref 136–145)
STJ: 2.44 CM
T4 FREE SERPL-MCNC: 0.81 NG/DL (ref 0.71–1.51)
TDI LATERAL: 0.05 M/S
TDI SEPTAL: 0.05 M/S
TDI: 0.05 M/S
TRICUSPID ANNULAR PLANE SYSTOLIC EXCURSION: 2.29 CM
TSH SERPL DL<=0.005 MIU/L-ACNC: 1.22 UIU/ML (ref 0.4–4)
WBC # BLD AUTO: 7.06 K/UL (ref 3.9–12.7)

## 2022-10-07 PROCEDURE — 25000003 PHARM REV CODE 250

## 2022-10-07 PROCEDURE — 63600175 PHARM REV CODE 636 W HCPCS: Performed by: PHYSICIAN ASSISTANT

## 2022-10-07 PROCEDURE — 25000003 PHARM REV CODE 250: Performed by: PHYSICIAN ASSISTANT

## 2022-10-07 PROCEDURE — 36415 COLL VENOUS BLD VENIPUNCTURE: CPT

## 2022-10-07 PROCEDURE — 25000003 PHARM REV CODE 250: Performed by: INTERNAL MEDICINE

## 2022-10-07 PROCEDURE — 99239 HOSP IP/OBS DSCHRG MGMT >30: CPT | Mod: ,,, | Performed by: INTERNAL MEDICINE

## 2022-10-07 PROCEDURE — 85025 COMPLETE CBC W/AUTO DIFF WBC: CPT

## 2022-10-07 PROCEDURE — 83735 ASSAY OF MAGNESIUM: CPT

## 2022-10-07 PROCEDURE — 84439 ASSAY OF FREE THYROXINE: CPT | Performed by: INTERNAL MEDICINE

## 2022-10-07 PROCEDURE — 63600175 PHARM REV CODE 636 W HCPCS: Performed by: INTERNAL MEDICINE

## 2022-10-07 PROCEDURE — 99239 PR HOSPITAL DISCHARGE DAY,>30 MIN: ICD-10-PCS | Mod: ,,, | Performed by: INTERNAL MEDICINE

## 2022-10-07 PROCEDURE — 84443 ASSAY THYROID STIM HORMONE: CPT | Performed by: INTERNAL MEDICINE

## 2022-10-07 PROCEDURE — 80048 BASIC METABOLIC PNL TOTAL CA: CPT

## 2022-10-07 PROCEDURE — 84100 ASSAY OF PHOSPHORUS: CPT

## 2022-10-07 RX ORDER — FUROSEMIDE 40 MG/1
TABLET ORAL
Qty: 35 TABLET | Refills: 0 | Status: ON HOLD | OUTPATIENT
Start: 2022-10-07 | End: 2023-09-21 | Stop reason: HOSPADM

## 2022-10-07 RX ORDER — POTASSIUM CHLORIDE 750 MG/1
40 TABLET, EXTENDED RELEASE ORAL DAILY
Qty: 16 TABLET | Refills: 0 | Status: SHIPPED | OUTPATIENT
Start: 2022-10-07 | End: 2022-10-12

## 2022-10-07 RX ORDER — LANOLIN ALCOHOL/MO/W.PET/CERES
400 CREAM (GRAM) TOPICAL ONCE
Status: COMPLETED | OUTPATIENT
Start: 2022-10-07 | End: 2022-10-07

## 2022-10-07 RX ORDER — AMLODIPINE BESYLATE 2.5 MG/1
5 TABLET ORAL DAILY
Status: ON HOLD
Start: 2022-10-07 | End: 2023-09-21 | Stop reason: HOSPADM

## 2022-10-07 RX ORDER — VALSARTAN 320 MG/1
320 TABLET ORAL DAILY
Qty: 30 TABLET | Refills: 2 | Status: ON HOLD | OUTPATIENT
Start: 2022-10-07 | End: 2023-09-21 | Stop reason: HOSPADM

## 2022-10-07 RX ORDER — POTASSIUM CHLORIDE 20 MEQ/1
40 TABLET, EXTENDED RELEASE ORAL EVERY 6 HOURS
Status: DISCONTINUED | OUTPATIENT
Start: 2022-10-07 | End: 2022-10-07 | Stop reason: HOSPADM

## 2022-10-07 RX ADMIN — LOSARTAN POTASSIUM 50 MG: 50 TABLET, FILM COATED ORAL at 09:10

## 2022-10-07 RX ADMIN — FUROSEMIDE 40 MG: 10 INJECTION, SOLUTION INTRAMUSCULAR; INTRAVENOUS at 06:10

## 2022-10-07 RX ADMIN — LABETALOL HYDROCHLORIDE 100 MG: 100 TABLET, FILM COATED ORAL at 11:10

## 2022-10-07 RX ADMIN — LABETALOL HYDROCHLORIDE 100 MG: 100 TABLET, FILM COATED ORAL at 07:10

## 2022-10-07 RX ADMIN — Medication 400 MG: at 12:10

## 2022-10-07 RX ADMIN — LEVOTHYROXINE SODIUM 50 MCG: 50 TABLET ORAL at 06:10

## 2022-10-07 RX ADMIN — ATORVASTATIN CALCIUM 40 MG: 20 TABLET, FILM COATED ORAL at 09:10

## 2022-10-07 RX ADMIN — ONDANSETRON 8 MG: 8 TABLET, ORALLY DISINTEGRATING ORAL at 01:10

## 2022-10-07 RX ADMIN — HYDRALAZINE HYDROCHLORIDE 10 MG: 20 INJECTION, SOLUTION INTRAMUSCULAR; INTRAVENOUS at 04:10

## 2022-10-07 RX ADMIN — AMLODIPINE BESYLATE 5 MG: 5 TABLET ORAL at 09:10

## 2022-10-07 RX ADMIN — POTASSIUM CHLORIDE 40 MEQ: 1500 TABLET, EXTENDED RELEASE ORAL at 12:10

## 2022-10-07 NOTE — PLAN OF CARE
[facility] - Intensive Care ([facility]  Initial Discharge Assessment       Primary Care Provider: Ilana Gutierrez MD  Admission Diagnosis: Acute pulmonary edema [J81.0]  SOB (shortness of breath) [R06.02]  Dyspnea on exertion [R06.09]  Hypertension [I10]  Elevated brain natriuretic peptide (BNP) level [R79.89]  Chest pain [R07.9]  Acute left-sided thoracic back pain [M54.6]  Admission Date: 10/6/2022  Expected Discharge Date:     CM met with patient  at bedside to complete Discharge Planning Assessment.  Patient is AAO x 4.  CM verified that all demographic information on face sheet is correct.  CM verified PCP -  Ilana Gutierrez.  CM verified primary health insurance is BC[employer]BS federal and secondary is none.  Patient current with home health - no.  Has below listed DME.  POA and Living Will on file - no.  Patient on dialysis - no .  Patient on coumadin - no.  Patient is a 30-day readmission - no.  Patient currently has financial issues - no.  Patient will have transportation upon discharge from facility - yes per family  .  Patient ADLs - independent .  Patient lives -  alone.   Discharge Planning Booklet given to patient/family and discussed.    All questions addressed.       Payor: BLUE CROSS BLUE SHIELD / Plan: [facility] [employer] BASIC / Product Type: PPO /   Extended Emergency Contact Information  Primary Emergency Contact: Edil Yoon  Address: [address] [place] [address]  Home Phone: [phone]  Mobile Phone: [phone]  Relation: Spouse  Secondary Emergency Contact: Mary Carmen Basurto  Mobile Phone: [phone]  Relation: Relative  Preferred language: English   needed? No  Discharge Plan A: Home       [facility] - [id number] - [place], [place] - [address]  [address] [address]  Phone: [phone] Fax: [phone]    [id number] [place], [place] [facility] [address]  [address] [facility] [place] [address]  Phone:  [phone] Fax: [phone]    Initial Assessment (most recent)       Adult Discharge Assessment - 10/07/22 1356          Discharge Assessment    Assessment Type Discharge Planning Assessment     Confirmed/corrected address, phone number and insurance Yes     Confirmed Demographics Correct on Facesheet     Source of Information patient     Communicated CHRISTIANNE with patient/caregiver Yes     Lives With alone     Do you have help at home or someone to help you manage your care at home? Yes     Prior to hospitilization cognitive status: Alert/Oriented     Current cognitive status: Alert/Oriented     Walking or Climbing Stairs Difficulty none     Dressing/Bathing Difficulty none     Home Accessibility wheelchair accessible     Home Layout Able to live on 1st floor     Equipment Currently Used at Home none     Readmission within 30 days? No     Patient currently being followed by outpatient case management? No     Do you currently have service(s) that help you manage your care at home? No     Do you take prescription medications? Yes     Do you have prescription coverage? Yes     Do you have any problems affording any of your prescribed medications? No     Is the patient taking medications as prescribed? yes     How do you get to doctors appointments? family or friend will provide     Are you on dialysis? No     Do you take coumadin? No     Discharge Plan A Home                      Michele Iraheta RN  Case Management  Ext: [phone]

## 2022-10-07 NOTE — ED NOTES
Called telemetry multiple times about tele box, reports leads failed, replaced leads, still no change in status. Waiting for tele box from floor

## 2022-10-08 NOTE — DISCHARGE SUMMARY
[facility] - Intensive Care ([place] [id number])  [facility] Medicine  Discharge Summary      Patient Name: Olga Lopez  MRN: [id number]  Admission Date: 10/6/2022  Hospital Length of Stay: 1 days  Discharge Date and Time:  10/07/2022 7:13 PM  Attending Physician: No att. providers found   Discharging Provider: Hans Cervantes MD  Primary Care Provider: Ilana Moise MD        HPI:   Olga Lopez is a 57 y.o. with HTN, HLD, T2DM, GERD, and COPD presents to the ED with complaints of shortness of breath and left flank pain. Patient reports 5 days ago she noticed a sharp pain under her left breast which is worse with deep inspiration. She states three days later she developed a non-productive cough and chest congestion. It wasn't until yesterday that she became acutely short of breath. At baseline, she is able to carry buckets of water from her kitchen to her garden however is now unable to do so. She has not noticed significant swelling in her lower extremities however does report mild abdominal extension and weight gain over the last few months. She denies fever, chills, nausea, vomiting, diarrhea, or chest pain. She reports a remote smoking history--- stopped 25 years ago. She reports a significant family history of cardiac disease and failure. She lives at home with her  and completes all ADL's independently.        ED: Per EDMD, she had mild JVD, trace pedal edema, and fine bibasilar wheeze.  Treated her with COPD with DuoNebs.  BNP slightly elevated, troponin baseline.  D-dimer slightly elevated but CTA shows no PE.  However does show pulmonary edema and bilateral pleural effusions.  Lasix 80 mg IV given.  Good diuresis at approximately 1 L.         * No surgery found *      Hospital Course:     the patient presented with shortness of breath and hypertension in the setting not taking home blood pressure medications found to be in diastolic heart failure.    Lasix was administered and blood  pressure medicines resume.  At the time of discharge the patient was asymptomatic and was able to comfortably ambulate without shortness of breath or desaturation.    She wishes to be discharged and feels back to her normal self.      She reports that when she is compliant with her home regimen that her blood pressure remains high.    Discharge exam she still has crackles at bases but appears comfortable and is saturating well on room air.  She will be discharged on Lasix 40 mg twice a day for 5 days and then is to return to her home dosing of 40 mg daily.  As she is hypokalemic she will be discharged on potassium for 5 days.  She has agreed to see her PCP for volume status assessment and labs and vitals within 1 week.  Echocardiogram was performed in does not demonstrate reduced left ventricular ejection fraction.    Regarding her blood pressure she reports in contrast to the initial med rec that she is taking Norvasc 5 and losartan 100 in addition to the other medications.  For improved blood pressure control losartan will be changed to valsartan.  Follow with PCP as above.      * Acute respiratory failure with hypoxia  Secondary to acute diastolic congestive heart failure precipitated by severe hypertension  Patient with Hypoxic Respiratory failure which is Acute.  she is not on home oxygen. Supplemental oxygen was provided and noted- 2-3L   Signs/symptoms of respiratory failure include- respiratory distress. Contributing diagnoses includes - CHF, Hypoventilation Labs and images were reviewed. Patient Has not had a recent ABG. Will treat underlying causes and adjust management of respiratory failure as follows      - diuretics, see below   - see COPD      COPD (chronic obstructive pulmonary disease)  Not in acute exacerbation.  Resume home regimen.  Acute diastolic congestive heart failure  Patient has not been identified as having heart failure at this time though there is a high clinical suspicion in the  setting of elevated BNP, JVD and dyspnea.    - CHF is currently uncontrolled due to Rales/crackles on pulmonary exam.    Continue to stress to patient importance of self efficacy and  on diet for CHF.           - Last BNP reviewed- and noted below Recent Labs   Lab 10/06/22  0655   *   .        Controlled type 2 diabetes mellitus without complication, without long-term current use of insulin        Lab Results   Component Value Date     HGBA1C 6.5 (H) 06/30/2022    Resume home regimen     Hyperlipidemia  - atorvastatin (LIPITOR) 40 MG tablet daily      Postoperative hypothyroidism  - levothyroxine (SYNTHROID) 50 MCG tablet daily      Hypertension  As above     The patient experienced a rapid unexpected improvement allowing for discharge in less than 2 midnights.        Consults:     Final Active Diagnoses:    Diagnosis Date Noted POA    PRINCIPAL PROBLEM:  Acute respiratory failure with hypoxia [J96.01] 10/06/2022 Yes    Atelectasis [J98.11] 10/06/2022 Yes    Hyperlipidemia [E78.5] 10/06/2022 Yes    GERD (gastroesophageal reflux disease) [K21.9] 10/06/2022 Yes    COPD (chronic obstructive pulmonary disease) [J44.9] 10/06/2022 Yes    Acute congestive heart failure [I50.9] 10/06/2022 Yes    Postoperative hypothyroidism [E89.0] 06/03/2022 Yes    Hypertension [I10] 04/14/2022 Yes    Controlled type 2 diabetes mellitus without complication, without long-term current use of insulin [E11.9] 04/14/2022 Yes      Problems Resolved During this Admission:      Discharged Condition: stable    Disposition: Home or Self Care    Follow Up:   Follow-up Information       Ilana Moise MD. Schedule an appointment as soon as possible for a visit in 1 week.    Specialty: Internal Medicine  Why: Requires labs (CBC, CMP, Mg, Phos) and to determine if lasix dose should continue as daily or be changed. Follow up blood pressure  Contact information:  [facility]  [address]  [address]  [facility]  [address]  [phone]               [facility] - Cardiology - 3rd Fl. Go to .    Specialty: Cardiology  Why: They will call you for follow-up appointment  Contact information:  [facility]  [facility] [address] [phone]  Additional information:  Cardiology Services Clinics - 3rd floor                         Patient Instructions:      Ambulatory referral/consult to [facility]   Standing Status: Future   Referral Priority: Urgent Referral Type: Consultation   Referral Reason: Specialty Services Required   Requested Specialty: Cardiology   Number of Visits Requested: 1     Notify your health care provider if you experience any of the following:  temperature >100.4     Notify your health care provider if you experience any of the following:  persistent nausea and vomiting or diarrhea     Notify your health care provider if you experience any of the following:  severe uncontrolled pain     Notify your health care provider if you experience any of the following:  difficulty breathing or increased cough     Notify your health care provider if you experience any of the following:  severe persistent headache     Notify your health care provider if you experience any of the following:  worsening rash     Notify your health care provider if you experience any of the following:  persistent dizziness, light-headedness, or visual disturbances     Notify your health care provider if you experience any of the following:  increased confusion or weakness     Notify your health care provider if you experience any of the following:     Activity as tolerated     Medications:  Reconciled Home Medications:      Medication List        START taking these medications      potassium chloride 10 MEQ Tbsr  Commonly known as: KLOR-CON  Take 4 tablets (40 mEq total) by mouth once daily. for 4 doses     valsartan 320 MG tablet  Commonly known as: DIOVAN  Take 1 tablet (320 mg total) by mouth once  daily.            CHANGE how you take these medications      amLODIPine 2.5 MG tablet  Commonly known as: NORVASC  Take 2 tablets (5 mg total) by mouth once daily. Patient reports she was taking 5 daily not twice a day  What changed:   when to take this  additional instructions     furosemide 40 MG tablet  Commonly known as: LASIX  Take One tablet by mouth twice a day for 5 days, then take one tablet daily  What changed:   how much to take  how to take this  when to take this  additional instructions            CONTINUE taking these medications      acetaminophen 500 MG tablet  Commonly known as: TYLENOL  Take 500 mg by mouth 2 (two) times daily.     aspirin 81 MG EC tablet  Commonly known as: ECOTRIN  Take 81 mg by mouth once daily.     atorvastatin 40 MG tablet  Commonly known as: LIPITOR  Take 40 mg by mouth once daily.     blood sugar diagnostic Strp  One test strip use 1 times a day to check blood glucose,  ICD-10: E11.9, compatible with insurance/glucometer     blood-glucose meter kit  One glucometer, use to check 1 times a day.   ICD-10: E11.9,  Dispense machine covered by insurance     fish oil-omega-3 fatty acids 300-1,000 mg capsule  Take by mouth once daily.     HYDROcodone-acetaminophen 5-325 mg per tablet  Commonly known as: NORCO  Take 1 tablet by mouth every 4 (four) hours as needed for Pain.     labetaloL 100 MG tablet  Commonly known as: NORMODYNE  Take 100 mg by mouth 3 (three) times daily with meals.     lancets Misc  One lancets use 1 times a day to check blood glucose, ICD-10: E11.9     lancing device Misc  One device, used to check blood glucose, ICD-10: E11.9     levothyroxine 50 MCG tablet  Commonly known as: SYNTHROID  TAKE 1 TABLET BY MOUTH BEFORE BREAKFAST     metFORMIN 500 MG ER 24hr tablet  Commonly known as: GLUCOPHAGE-XR  Take 1 tablet (500 mg total) by mouth 2 (two) times daily with meals.     omeprazole 20 MG capsule  Commonly known as: PRILOSEC  Take 20 mg by mouth once daily.      ondansetron 4 MG tablet  Commonly known as: ZOFRAN  Take 1 tablet (4 mg total) by mouth every 6 (six) hours.     ZYRTEC ORAL  Take by mouth.            STOP taking these medications      losartan 50 MG tablet  Commonly known as: COZAAR     potassium chloride in dextrose 10 mEq/100 mL IVPB                Pending Diagnostic Studies:       None          Indwelling Lines/Drains at time of discharge:   Lines/Drains/Airways       None                   Time spent on the discharge of patient: 35 minutes         Hans Cervantes MD  Department of Hospital Medicine  [facility] - Intensive Care ([place])

## 2022-10-11 ENCOUNTER — TELEPHONE (OUTPATIENT)
Dept: CARDIOLOGY | Facility: CLINIC | Age: 57
End: 2022-10-11
Payer: COMMERCIAL

## 2022-10-11 NOTE — TELEPHONE ENCOUNTER
[facility]    Attempted to call pt to complete Phone enrollment to program. Unable to reach pt at listed phone numbers.  Was unable to leave message with family nor on voicemail. Voicemail not set up or full.  RN will attempt again.     Will continue to try to reach patient.

## 2022-10-12 ENCOUNTER — TELEPHONE (OUTPATIENT)
Dept: CARDIOLOGY | Facility: CLINIC | Age: 57
End: 2022-10-12
Payer: COMMERCIAL

## 2022-10-12 NOTE — TELEPHONE ENCOUNTER
[facility] ([facility]) Team notified of pt referral via [facility] (automated inbasket notification) .    Patient screened today by provider and RN for enrollment to program.      Pt was deemed not a candidate for enrollment at this time related to patient refused. Pt refused clinic services. She states she went to the ER r/t her BP. Pt states she was not diagnosed with HF.  Rn explained the purpose of HFTCC clinic and informed pt it was not mandatory but a resource for her benefit.  Pt again refused services of clinic at this time.     Pt will require additional follow up planning per primary team.     If pt status, diagnosis, or treatment plan changes , please place AMB referral to [facility] ([id number]) for HFTCC enrollment re-evalution.

## 2022-10-12 NOTE — TELEPHONE ENCOUNTER
[facility]    Attempted to call pt to complete Phone enrollment to program. Unable to reach pt at listed phone numbers.  Was unable to leave message with family nor on voicemail. Voicemail not set up or full.      Will continue to try to reach patient.

## 2022-11-11 ENCOUNTER — OFFICE VISIT (OUTPATIENT)
Dept: ENDOCRINOLOGY | Facility: CLINIC | Age: 57
End: 2022-11-11
Payer: COMMERCIAL

## 2022-11-11 VITALS
BODY MASS INDEX: 36.28 KG/M2 | WEIGHT: 192 LBS | TEMPERATURE: 98 F | HEART RATE: 92 BPM | DIASTOLIC BLOOD PRESSURE: 107 MMHG | SYSTOLIC BLOOD PRESSURE: 187 MMHG

## 2022-11-11 DIAGNOSIS — E66.01 SEVERE OBESITY (BMI 35.0-39.9) WITH COMORBIDITY: ICD-10-CM

## 2022-11-11 DIAGNOSIS — E89.0 POSTOPERATIVE HYPOTHYROIDISM: ICD-10-CM

## 2022-11-11 DIAGNOSIS — E11.9 CONTROLLED TYPE 2 DIABETES MELLITUS WITHOUT COMPLICATION, WITHOUT LONG-TERM CURRENT USE OF INSULIN: Primary | ICD-10-CM

## 2022-11-11 DIAGNOSIS — R80.9 MICROALBUMINURIA: ICD-10-CM

## 2022-11-11 DIAGNOSIS — I10 HYPERTENSION, UNSPECIFIED TYPE: ICD-10-CM

## 2022-11-11 PROCEDURE — 3077F SYST BP >= 140 MM HG: CPT | Mod: CPTII,S$GLB,, | Performed by: NURSE PRACTITIONER

## 2022-11-11 PROCEDURE — 3008F BODY MASS INDEX DOCD: CPT | Mod: CPTII,S$GLB,, | Performed by: NURSE PRACTITIONER

## 2022-11-11 PROCEDURE — 3080F PR MOST RECENT DIASTOLIC BLOOD PRESSURE >= 90 MM HG: ICD-10-PCS | Mod: CPTII,S$GLB,, | Performed by: NURSE PRACTITIONER

## 2022-11-11 PROCEDURE — 4010F ACE/ARB THERAPY RXD/TAKEN: CPT | Mod: CPTII,S$GLB,, | Performed by: NURSE PRACTITIONER

## 2022-11-11 PROCEDURE — 99999 PR PBB SHADOW E&M-EST. PATIENT-LVL V: ICD-10-PCS | Mod: PBBFAC,,, | Performed by: NURSE PRACTITIONER

## 2022-11-11 PROCEDURE — 1160F RVW MEDS BY RX/DR IN RCRD: CPT | Mod: CPTII,S$GLB,, | Performed by: NURSE PRACTITIONER

## 2022-11-11 PROCEDURE — 99999 PR PBB SHADOW E&M-EST. PATIENT-LVL V: CPT | Mod: PBBFAC,,, | Performed by: NURSE PRACTITIONER

## 2022-11-11 PROCEDURE — 3051F PR MOST RECENT HEMOGLOBIN A1C LEVEL 7.0 - < 8.0%: ICD-10-PCS | Mod: CPTII,S$GLB,, | Performed by: NURSE PRACTITIONER

## 2022-11-11 PROCEDURE — 99214 OFFICE O/P EST MOD 30 MIN: CPT | Mod: S$GLB,,, | Performed by: NURSE PRACTITIONER

## 2022-11-11 PROCEDURE — 3077F PR MOST RECENT SYSTOLIC BLOOD PRESSURE >= 140 MM HG: ICD-10-PCS | Mod: CPTII,S$GLB,, | Performed by: NURSE PRACTITIONER

## 2022-11-11 PROCEDURE — 1160F PR REVIEW ALL MEDS BY PRESCRIBER/CLIN PHARMACIST DOCUMENTED: ICD-10-PCS | Mod: CPTII,S$GLB,, | Performed by: NURSE PRACTITIONER

## 2022-11-11 PROCEDURE — 3051F HG A1C>EQUAL 7.0%<8.0%: CPT | Mod: CPTII,S$GLB,, | Performed by: NURSE PRACTITIONER

## 2022-11-11 PROCEDURE — 1159F PR MEDICATION LIST DOCUMENTED IN MEDICAL RECORD: ICD-10-PCS | Mod: CPTII,S$GLB,, | Performed by: NURSE PRACTITIONER

## 2022-11-11 PROCEDURE — 4010F PR ACE/ARB THEARPY RXD/TAKEN: ICD-10-PCS | Mod: CPTII,S$GLB,, | Performed by: NURSE PRACTITIONER

## 2022-11-11 PROCEDURE — 1159F MED LIST DOCD IN RCRD: CPT | Mod: CPTII,S$GLB,, | Performed by: NURSE PRACTITIONER

## 2022-11-11 PROCEDURE — 99214 PR OFFICE/OUTPT VISIT, EST, LEVL IV, 30-39 MIN: ICD-10-PCS | Mod: S$GLB,,, | Performed by: NURSE PRACTITIONER

## 2022-11-11 PROCEDURE — 3080F DIAST BP >= 90 MM HG: CPT | Mod: CPTII,S$GLB,, | Performed by: NURSE PRACTITIONER

## 2022-11-11 PROCEDURE — 3008F PR BODY MASS INDEX (BMI) DOCUMENTED: ICD-10-PCS | Mod: CPTII,S$GLB,, | Performed by: NURSE PRACTITIONER

## 2022-11-11 RX ORDER — LEVOTHYROXINE SODIUM 50 UG/1
50 TABLET ORAL
Qty: 90 TABLET | Refills: 1 | Status: SHIPPED | OUTPATIENT
Start: 2022-11-11

## 2022-11-11 RX ORDER — METFORMIN HYDROCHLORIDE 500 MG/1
TABLET, EXTENDED RELEASE ORAL
Qty: 270 TABLET | Refills: 1 | Status: ON HOLD | OUTPATIENT
Start: 2022-11-11 | End: 2023-09-21 | Stop reason: SDUPTHER

## 2022-11-11 NOTE — PROGRESS NOTES
CC: This 57 y.o. Black or  female  is here for evaluation of  T2DM along with comorbidities indicated in the Visit Diagnosis section of this encounter.    HPI: Olga Lopez was diagnosed with T2DM in ~ 2019.   Past medical history:  Goiter, obesity, hypertension, heart disease, COPD/Asthma     DM COMPLICATIONS: nephropathy      Last seen by Dr. Wiggins in 6/2022. New to me.   A1c drawn this month was 7.1%, same as it was in October.   Pt has not seen diabetes educator. Cites limited access to transportation.     LAST DIABETES EDUCATION: never     HOSPITALIZED FOR DIABETES  -  No.    SIGNIFICANT DIABETES MED HISTORY: denies intolerance to prior DM meds      PRESCRIBED DIABETES MEDICATIONS:   Metformin  mg bid     Misses medication doses - No    SELF MONITORING BLOOD GLUCOSE: Checks blood glucose at home - none    HYPOGLYCEMIC EPISODES:      CURRENT DIET: drinks sweet tea, or unsweet tea, juice, Vitamin water without sugar, 6 oz decaf sugar with sugar.   Eats breakfast and supper. Midafternoon snack - package of crackers.   Avoids eating after 6:30 pm.   Likes to eat something sweet after supper like 3 small cookies or candy.     CURRENT EXERCISE:       BP (!) 187/107   Pulse 92   Temp 98 °F (36.7 °C)   Wt 87.1 kg (192 lb)   BMI 36.28 kg/m²     ROS:   CONSTITUTIONAL: Appetite good, denies fatigue  GI: no diarrhea, + loose BMs   OTHER: n/a      PHYSICAL EXAM:  GENERAL: Well developed, well nourished. No acute distress.   PSYCH: AAOx3, appropriate mood and affect, conversant, well-groomed. Judgement and insight good.   NEURO: Cranial nerves grossly intact. Speech clear, no tremor.   CHEST: Respirations even and unlabored.               Hemoglobin A1C   Date Value Ref Range Status   10/06/2022 7.1 (H) 4.0 - 5.6 % Final     Comment:     ADA Screening Guidelines:  5.7-6.4%  Consistent with prediabetes  >or=6.5%  Consistent with diabetes    High levels of fetal hemoglobin interfere with the  HbA1C  assay. Heterozygous hemoglobin variants (HbS, HgC, etc)do  not significantly interfere with this assay.   However, presence of multiple variants may affect accuracy.     06/30/2022 6.5 (H) 4.0 - 5.6 % Final     Comment:     ADA Screening Guidelines:  5.7-6.4%  Consistent with prediabetes  >or=6.5%  Consistent with diabetes    High levels of fetal hemoglobin interfere with the HbA1C  assay. Heterozygous hemoglobin variants (HbS, HgC, etc)do  not significantly interfere with this assay.   However, presence of multiple variants may affect accuracy.     05/20/2022 6.3 (H) 4.0 - 5.6 % Final     Comment:     ADA Screening Guidelines:  5.7-6.4%  Consistent with prediabetes  >or=6.5%  Consistent with diabetes    High levels of fetal hemoglobin interfere with the HbA1C  assay. Heterozygous hemoglobin variants (HbS, HgC, etc)do  not significantly interfere with this assay.   However, presence of multiple variants may affect accuracy.         No results found for: CPEPTIDE, GLUTAMICACID, ISLETCELLANT, FRUCTOSAMINE     No results found for: CHOL  No results found for: HDL  No results found for: LDLCALC  No results found for: TRIG  No results found for: CHOLHDL      Chemistry        Component Value Date/Time     10/07/2022 0540    K 3.2 (L) 10/07/2022 0540     10/07/2022 0540    CO2 24 10/07/2022 0540    BUN 9 10/07/2022 0540    CREATININE 0.7 10/07/2022 0540     (H) 10/07/2022 0540        Component Value Date/Time    CALCIUM 9.5 10/07/2022 0540    ALKPHOS 116 10/06/2022 0655    AST 21 10/06/2022 0655    ALT 18 10/06/2022 0655    BILITOT 0.3 10/06/2022 0655    ESTGFRAFRICA >60.0 06/30/2022 2109    EGFRNONAA >60.0 06/30/2022 2109              No results found for: LABMICR, CREATRANDUR, MICALBCREAT        ASSESSMENT and PLAN:    A1C GOAL: < 7 %     1. Controlled type 2 diabetes mellitus without complication, without long-term current use of insulin  Reviewed glycemic goals.     Avoid beverages with sugar.    Increase metformin to 1000 mg with breakfast and 500 mg with supper (2 tablets with breakfast and 1 tablet with supper).  Recommend checking blood sugar daily, 2 hours after meal.   Return to clinic in 3 months with labs prior with Dr. Wiggins.     metFORMIN (GLUCOPHAGE-XR) 500 MG ER 24hr tablet    Hemoglobin A1C    Hemoglobin A1C      2. Severe obesity (BMI 35.0-39.9) with comorbidity  Improve diet.       3. Hypertension, unspecified type  F/u with PCP   4. Microalbuminuria - continue valsartan, Improve glycemic and BP control.          Orders Placed This Encounter   Procedures    Hemoglobin A1C     Standing Status:   Future     Number of Occurrences:   1     Standing Expiration Date:   1/10/2024        Follow up in about 3 months (around 2/11/2023).

## 2022-11-11 NOTE — PATIENT INSTRUCTIONS
A1C goal: <7%  Fasting/premeal blood glucose goal:   2 hour post-meal blood glucose goal: less than 180      Avoid beverages with sugar.   Increase metformin to 1000 mg with breakfast and 500 mg with supper (2 tablets with breakfast and 1 tablet with supper).  Recommend checking blood sugar daily, 2 hours after meal.   Return to clinic in 3 months with a1c prior with Dr. Wiggins. Patient gets "[employer].

## 2023-01-09 PROBLEM — J96.01 ACUTE RESPIRATORY FAILURE WITH HYPOXIA: Status: RESOLVED | Noted: 2022-10-06 | Resolved: 2023-01-09

## 2023-02-08 ENCOUNTER — TELEPHONE (OUTPATIENT)
Dept: ENDOCRINOLOGY | Facility: CLINIC | Age: 58
End: 2023-02-08
Payer: COMMERCIAL

## 2023-02-08 DIAGNOSIS — E11.9 CONTROLLED TYPE 2 DIABETES MELLITUS WITHOUT COMPLICATION, WITHOUT LONG-TERM CURRENT USE OF INSULIN: ICD-10-CM

## 2023-02-08 DIAGNOSIS — E89.0 POSTOPERATIVE HYPOTHYROIDISM: Primary | ICD-10-CM

## 2023-02-08 NOTE — TELEPHONE ENCOUNTER
----- Message from Ilana Milton sent at 2/8/2023 11:45 AM CST -----  Type: Patient Call Back    Who called:pt     What is the request in detail:pt requesting to get orders sent to [facility] in [place] for fasting labs. Call pt     Can the clinic reply by [facility]?    Would the patient rather a call back or a response via [facility]? call    Best call back number:[phone] (home)       Additional Information:

## 2023-02-13 ENCOUNTER — TELEPHONE (OUTPATIENT)
Dept: ENDOCRINOLOGY | Facility: CLINIC | Age: 58
End: 2023-02-13

## 2023-02-13 NOTE — TELEPHONE ENCOUNTER
----- Message from Lety Leggett sent at 2/13/2023  1:30 PM CST -----  Type: Patient Call Back    Who called: Self     What is the request in detail: Asking for nurse to give a call     Can the clinic reply by [facility]? No     Would the patient rather a call back or a response via [facility]? CALL     Best call back number:[phone] (cell) OR  [phone] (home)

## 2023-03-29 LAB — PTH-INTACT SERPL-MCNC: 48 PG/ML (ref 16–77)

## 2023-09-15 ENCOUNTER — HOSPITAL ENCOUNTER (INPATIENT)
Facility: HOSPITAL | Age: 58
LOS: 5 days | Discharge: HOME HEALTH | DRG: 637 | End: 2023-09-21
Attending: EMERGENCY MEDICINE | Admitting: HOSPITALIST
Payer: COMMERCIAL

## 2023-09-15 DIAGNOSIS — E11.9 DIABETES: ICD-10-CM

## 2023-09-15 DIAGNOSIS — J30.2 SEASONAL ALLERGIES: ICD-10-CM

## 2023-09-15 DIAGNOSIS — K57.92 DIVERTICULITIS: ICD-10-CM

## 2023-09-15 DIAGNOSIS — E11.10 DIABETIC KETOACIDOSIS WITHOUT COMA ASSOCIATED WITH TYPE 2 DIABETES MELLITUS: ICD-10-CM

## 2023-09-15 DIAGNOSIS — R73.9 HYPERGLYCEMIA: ICD-10-CM

## 2023-09-15 DIAGNOSIS — R79.89 ABNORMAL LFTS: Primary | ICD-10-CM

## 2023-09-15 DIAGNOSIS — N17.9 AKI (ACUTE KIDNEY INJURY): ICD-10-CM

## 2023-09-15 DIAGNOSIS — R41.82 ALTERED MENTAL STATUS, UNSPECIFIED ALTERED MENTAL STATUS TYPE: ICD-10-CM

## 2023-09-15 DIAGNOSIS — H40.9 GLAUCOMA, UNSPECIFIED GLAUCOMA TYPE, UNSPECIFIED LATERALITY: Chronic | ICD-10-CM

## 2023-09-15 DIAGNOSIS — E11.9 CONTROLLED TYPE 2 DIABETES MELLITUS WITHOUT COMPLICATION, WITHOUT LONG-TERM CURRENT USE OF INSULIN: ICD-10-CM

## 2023-09-15 DIAGNOSIS — R26.81 GAIT INSTABILITY: ICD-10-CM

## 2023-09-15 LAB
ALBUMIN SERPL BCP-MCNC: 3.4 G/DL (ref 3.5–5.2)
ALLENS TEST: ABNORMAL
ALLENS TEST: ABNORMAL
ALP SERPL-CCNC: 308 U/L (ref 55–135)
ALT SERPL W/O P-5'-P-CCNC: 111 U/L (ref 10–44)
AMMONIA PLAS-SCNC: 36 UMOL/L (ref 10–50)
AMPHET+METHAMPHET UR QL: NEGATIVE
ANION GAP SERPL CALC-SCNC: 18 MMOL/L (ref 8–16)
AST SERPL-CCNC: 66 U/L (ref 10–40)
B-OH-BUTYR BLD STRIP-SCNC: 1.1 MMOL/L (ref 0–0.5)
BACTERIA #/AREA URNS AUTO: NORMAL /HPF
BARBITURATES UR QL SCN>200 NG/ML: NEGATIVE
BASOPHILS # BLD AUTO: 0.01 K/UL (ref 0–0.2)
BASOPHILS NFR BLD: 0.1 % (ref 0–1.9)
BENZODIAZ UR QL SCN>200 NG/ML: NEGATIVE
BILIRUB SERPL-MCNC: 0.4 MG/DL (ref 0.1–1)
BILIRUB UR QL STRIP: NEGATIVE
BNP SERPL-MCNC: 369 PG/ML (ref 0–99)
BUN SERPL-MCNC: 17 MG/DL (ref 6–30)
BUN SERPL-MCNC: 31 MG/DL (ref 6–20)
BZE UR QL SCN: NEGATIVE
CALCIUM SERPL-MCNC: 10.9 MG/DL (ref 8.7–10.5)
CANNABINOIDS UR QL SCN: NEGATIVE
CHLORIDE SERPL-SCNC: 116 MMOL/L (ref 95–110)
CHLORIDE SERPL-SCNC: 91 MMOL/L (ref 95–110)
CLARITY UR REFRACT.AUTO: ABNORMAL
CO2 SERPL-SCNC: 26 MMOL/L (ref 23–29)
COLOR UR AUTO: ABNORMAL
CREAT SERPL-MCNC: 0.4 MG/DL (ref 0.5–1.4)
CREAT SERPL-MCNC: 2.1 MG/DL (ref 0.5–1.4)
CREAT UR-MCNC: 15 MG/DL (ref 15–325)
DIFFERENTIAL METHOD: ABNORMAL
EOSINOPHIL # BLD AUTO: 0 K/UL (ref 0–0.5)
EOSINOPHIL NFR BLD: 0 % (ref 0–8)
ERYTHROCYTE [DISTWIDTH] IN BLOOD BY AUTOMATED COUNT: 14.7 % (ref 11.5–14.5)
EST. GFR  (NO RACE VARIABLE): 26.6 ML/MIN/1.73 M^2
GLUCOSE SERPL-MCNC: 1130 MG/DL (ref 70–110)
GLUCOSE SERPL-MCNC: 580 MG/DL (ref 70–110)
GLUCOSE UR QL STRIP: ABNORMAL
HAV IGM SERPL QL IA: NORMAL
HBV CORE IGM SERPL QL IA: NORMAL
HBV SURFACE AG SERPL QL IA: NORMAL
HCO3 UR-SCNC: 31.5 MMOL/L (ref 24–28)
HCT VFR BLD AUTO: 50.2 % (ref 37–48.5)
HCT VFR BLD CALC: 27 %PCV (ref 36–54)
HCV AB SERPL QL IA: NORMAL
HGB BLD-MCNC: 15.7 G/DL (ref 12–16)
HGB UR QL STRIP: ABNORMAL
HYALINE CASTS UR QL AUTO: 0 /LPF
IMM GRANULOCYTES # BLD AUTO: 0.06 K/UL (ref 0–0.04)
IMM GRANULOCYTES NFR BLD AUTO: 0.6 % (ref 0–0.5)
KETONES UR QL STRIP: ABNORMAL
LDH SERPL L TO P-CCNC: 2.66 MMOL/L (ref 0.5–2.2)
LEUKOCYTE ESTERASE UR QL STRIP: NEGATIVE
LIPASE SERPL-CCNC: 77 U/L (ref 4–60)
LYMPHOCYTES # BLD AUTO: 0.8 K/UL (ref 1–4.8)
LYMPHOCYTES NFR BLD: 7.9 % (ref 18–48)
MCH RBC QN AUTO: 25.9 PG (ref 27–31)
MCHC RBC AUTO-ENTMCNC: 31.3 G/DL (ref 32–36)
MCV RBC AUTO: 83 FL (ref 82–98)
METHADONE UR QL SCN>300 NG/ML: NEGATIVE
MICROSCOPIC COMMENT: NORMAL
MONOCYTES # BLD AUTO: 0.5 K/UL (ref 0.3–1)
MONOCYTES NFR BLD: 4.5 % (ref 4–15)
NEUTROPHILS # BLD AUTO: 9.2 K/UL (ref 1.8–7.7)
NEUTROPHILS NFR BLD: 86.9 % (ref 38–73)
NITRITE UR QL STRIP: NEGATIVE
NRBC BLD-RTO: 0 /100 WBC
OPIATES UR QL SCN: NEGATIVE
PCO2 BLDA: 55.2 MMHG (ref 35–45)
PCP UR QL SCN>25 NG/ML: NEGATIVE
PH SMN: 7.36 [PH] (ref 7.35–7.45)
PH UR STRIP: 6 [PH] (ref 5–8)
PLATELET # BLD AUTO: 306 K/UL (ref 150–450)
PMV BLD AUTO: 13 FL (ref 9.2–12.9)
PO2 BLDA: 35 MMHG (ref 40–60)
POC BE: 6 MMOL/L
POC IONIZED CALCIUM: 0.81 MMOL/L (ref 1.06–1.42)
POC SATURATED O2: 63 % (ref 95–100)
POC TCO2 (MEASURED): 16 MMOL/L (ref 23–29)
POC TCO2: 33 MMOL/L (ref 24–29)
POCT GLUCOSE: >500 MG/DL (ref 70–110)
POTASSIUM BLD-SCNC: 2.2 MMOL/L (ref 3.5–5.1)
POTASSIUM SERPL-SCNC: 4.7 MMOL/L (ref 3.5–5.1)
PROT SERPL-MCNC: 8.7 G/DL (ref 6–8.4)
PROT UR QL STRIP: ABNORMAL
RBC # BLD AUTO: 6.06 M/UL (ref 4–5.4)
RBC #/AREA URNS AUTO: 4 /HPF (ref 0–4)
SAMPLE: ABNORMAL
SITE: ABNORMAL
SITE: ABNORMAL
SODIUM BLD-SCNC: 147 MMOL/L (ref 136–145)
SODIUM SERPL-SCNC: 135 MMOL/L (ref 136–145)
SP GR UR STRIP: 1.03 (ref 1–1.03)
SQUAMOUS #/AREA URNS AUTO: 4 /HPF
TOXICOLOGY INFORMATION: NORMAL
TROPONIN I SERPL DL<=0.01 NG/ML-MCNC: 0.08 NG/ML (ref 0–0.03)
TROPONIN I SERPL DL<=0.01 NG/ML-MCNC: 0.09 NG/ML (ref 0–0.03)
URN SPEC COLLECT METH UR: ABNORMAL
WBC # BLD AUTO: 10.61 K/UL (ref 3.9–12.7)
WBC #/AREA URNS AUTO: 3 /HPF (ref 0–5)
YEAST UR QL AUTO: NORMAL

## 2023-09-15 PROCEDURE — 96376 TX/PRO/DX INJ SAME DRUG ADON: CPT

## 2023-09-15 PROCEDURE — 83605 ASSAY OF LACTIC ACID: CPT

## 2023-09-15 PROCEDURE — 80143 DRUG ASSAY ACETAMINOPHEN: CPT | Performed by: EMERGENCY MEDICINE

## 2023-09-15 PROCEDURE — 80307 DRUG TEST PRSMV CHEM ANLYZR: CPT | Performed by: EMERGENCY MEDICINE

## 2023-09-15 PROCEDURE — 85025 COMPLETE CBC W/AUTO DIFF WBC: CPT | Performed by: EMERGENCY MEDICINE

## 2023-09-15 PROCEDURE — 99291 CRITICAL CARE FIRST HOUR: CPT

## 2023-09-15 PROCEDURE — 80074 ACUTE HEPATITIS PANEL: CPT | Performed by: EMERGENCY MEDICINE

## 2023-09-15 PROCEDURE — 96368 THER/DIAG CONCURRENT INF: CPT

## 2023-09-15 PROCEDURE — 82010 KETONE BODYS QUAN: CPT | Performed by: EMERGENCY MEDICINE

## 2023-09-15 PROCEDURE — 96365 THER/PROPH/DIAG IV INF INIT: CPT

## 2023-09-15 PROCEDURE — 80053 COMPREHEN METABOLIC PANEL: CPT | Performed by: STUDENT IN AN ORGANIZED HEALTH CARE EDUCATION/TRAINING PROGRAM

## 2023-09-15 PROCEDURE — 82140 ASSAY OF AMMONIA: CPT | Performed by: EMERGENCY MEDICINE

## 2023-09-15 PROCEDURE — 83690 ASSAY OF LIPASE: CPT | Performed by: EMERGENCY MEDICINE

## 2023-09-15 PROCEDURE — 93010 EKG 12-LEAD: ICD-10-PCS | Mod: ,,, | Performed by: INTERNAL MEDICINE

## 2023-09-15 PROCEDURE — 84484 ASSAY OF TROPONIN QUANT: CPT | Mod: 91 | Performed by: EMERGENCY MEDICINE

## 2023-09-15 PROCEDURE — 93005 ELECTROCARDIOGRAM TRACING: CPT

## 2023-09-15 PROCEDURE — 25000003 PHARM REV CODE 250: Performed by: EMERGENCY MEDICINE

## 2023-09-15 PROCEDURE — 25000003 PHARM REV CODE 250: Performed by: STUDENT IN AN ORGANIZED HEALTH CARE EDUCATION/TRAINING PROGRAM

## 2023-09-15 PROCEDURE — 87040 BLOOD CULTURE FOR BACTERIA: CPT | Performed by: EMERGENCY MEDICINE

## 2023-09-15 PROCEDURE — 96361 HYDRATE IV INFUSION ADD-ON: CPT

## 2023-09-15 PROCEDURE — 81001 URINALYSIS AUTO W/SCOPE: CPT | Performed by: EMERGENCY MEDICINE

## 2023-09-15 PROCEDURE — 96366 THER/PROPH/DIAG IV INF ADDON: CPT

## 2023-09-15 PROCEDURE — 93010 ELECTROCARDIOGRAM REPORT: CPT | Mod: ,,, | Performed by: INTERNAL MEDICINE

## 2023-09-15 PROCEDURE — 99900035 HC TECH TIME PER 15 MIN (STAT)

## 2023-09-15 PROCEDURE — 82803 BLOOD GASES ANY COMBINATION: CPT

## 2023-09-15 PROCEDURE — 82962 GLUCOSE BLOOD TEST: CPT

## 2023-09-15 PROCEDURE — 63600175 PHARM REV CODE 636 W HCPCS: Performed by: EMERGENCY MEDICINE

## 2023-09-15 PROCEDURE — 63600175 PHARM REV CODE 636 W HCPCS: Performed by: STUDENT IN AN ORGANIZED HEALTH CARE EDUCATION/TRAINING PROGRAM

## 2023-09-15 PROCEDURE — 80053 COMPREHEN METABOLIC PANEL: CPT | Mod: 91 | Performed by: EMERGENCY MEDICINE

## 2023-09-15 PROCEDURE — 83880 ASSAY OF NATRIURETIC PEPTIDE: CPT | Performed by: EMERGENCY MEDICINE

## 2023-09-15 PROCEDURE — 96375 TX/PRO/DX INJ NEW DRUG ADDON: CPT

## 2023-09-15 PROCEDURE — 96367 TX/PROPH/DG ADDL SEQ IV INF: CPT

## 2023-09-15 RX ORDER — SODIUM CHLORIDE 0.9 % (FLUSH) 0.9 %
10 SYRINGE (ML) INJECTION
Status: DISCONTINUED | OUTPATIENT
Start: 2023-09-15 | End: 2023-09-19

## 2023-09-15 RX ORDER — ASPIRIN 325 MG
325 TABLET ORAL
Status: ACTIVE | OUTPATIENT
Start: 2023-09-15 | End: 2023-09-16

## 2023-09-15 RX ORDER — DEXTROSE MONOHYDRATE AND SODIUM CHLORIDE 5; .45 G/100ML; G/100ML
INJECTION, SOLUTION INTRAVENOUS CONTINUOUS PRN
Status: DISCONTINUED | OUTPATIENT
Start: 2023-09-15 | End: 2023-09-18

## 2023-09-15 RX ORDER — SODIUM CHLORIDE AND POTASSIUM CHLORIDE 150; 900 MG/100ML; MG/100ML
INJECTION, SOLUTION INTRAVENOUS
Status: COMPLETED | OUTPATIENT
Start: 2023-09-15 | End: 2023-09-15

## 2023-09-15 RX ORDER — ASPIRIN 325 MG
325 TABLET ORAL
Status: DISCONTINUED | OUTPATIENT
Start: 2023-09-15 | End: 2023-09-15

## 2023-09-15 RX ORDER — POTASSIUM CHLORIDE 7.45 MG/ML
10 INJECTION INTRAVENOUS
Status: COMPLETED | OUTPATIENT
Start: 2023-09-15 | End: 2023-09-16

## 2023-09-15 RX ORDER — LABETALOL 100 MG/1
100 TABLET, FILM COATED ORAL
Status: DISCONTINUED | OUTPATIENT
Start: 2023-09-15 | End: 2023-09-17

## 2023-09-15 RX ORDER — ASPIRIN 300 MG/1
300 SUPPOSITORY RECTAL
Status: DISCONTINUED | OUTPATIENT
Start: 2023-09-15 | End: 2023-09-15

## 2023-09-15 RX ADMIN — ASPIRIN 325 MG ORAL TABLET 325 MG: 325 PILL ORAL at 09:09

## 2023-09-15 RX ADMIN — SODIUM CHLORIDE 1000 ML: 0.9 INJECTION, SOLUTION INTRAVENOUS at 11:09

## 2023-09-15 RX ADMIN — VANCOMYCIN HYDROCHLORIDE 1250 MG: 1.25 INJECTION, POWDER, LYOPHILIZED, FOR SOLUTION INTRAVENOUS at 09:09

## 2023-09-15 RX ADMIN — POTASSIUM CHLORIDE 10 MEQ: 7.46 INJECTION, SOLUTION INTRAVENOUS at 11:09

## 2023-09-15 RX ADMIN — POTASSIUM BICARBONATE 40 MEQ: 391 TABLET, EFFERVESCENT ORAL at 11:09

## 2023-09-15 RX ADMIN — SODIUM CHLORIDE AND POTASSIUM CHLORIDE: .9; .15 SOLUTION INTRAVENOUS at 09:09

## 2023-09-15 RX ADMIN — SODIUM CHLORIDE 1000 ML: 9 INJECTION, SOLUTION INTRAVENOUS at 07:09

## 2023-09-15 RX ADMIN — INSULIN HUMAN 0.1 UNITS/KG/HR: 1 INJECTION, SOLUTION INTRAVENOUS at 09:09

## 2023-09-15 RX ADMIN — CEFEPIME 1 G: 1 INJECTION, POWDER, FOR SOLUTION INTRAMUSCULAR; INTRAVENOUS at 09:09

## 2023-09-15 RX ADMIN — LABETALOL HYDROCHLORIDE 100 MG: 100 TABLET, FILM COATED ORAL at 09:09

## 2023-09-15 NOTE — ED PROVIDER NOTES
"Encounter Date: 9/15/2023       History     Chief Complaint   Patient presents with    Altered Mental Status     Lethargic x 3 days, CBG reads "high". Stopped taking her metformin last week     Shortness of Breath     High end tidal 70, after duoneb 62     57 year old female presenting with altered mental status, high blood sugar.    PMH:   HTN, HLD, T2DM, GERD, and COPD     History limited due to patient's condition presentation.  Patient states she is prescribed metformin, but has not been taking it for at least 3 days.  She denies pain, specifically headache or chest pain.  Nursing triage note states short of breath.  No EMS personnel available by the time of my evaluation, but reportedly had high end-tidal and received a duoneb with improvement.     The history is provided by the patient and medical records. The history is limited by the condition of the patient. No  was used.     Review of patient's allergies indicates:   Allergen Reactions    Vibramycin [doxycycline calcium] Hives    Codeine     Pcn [penicillins]     Sulfa (sulfonamide antibiotics)     Tetracyclines      Past Medical History:   Diagnosis Date    COPD (chronic obstructive pulmonary disease)     GERD (gastroesophageal reflux disease)     Hypertension     Seasonal allergies      Past Surgical History:   Procedure Laterality Date    bladder lift  1994    BREAST SURGERY      EXCISION OF LESION OF THYROID      EYE SURGERY Right 1968    HYSTERECTOMY      OPEN REDUCTION AND INTERNAL FIXATION (ORIF) OF FRACTURE OF OLECRANON PROCESS OF ULNA Right 9/30/2020    Procedure: ORIF, FRACTURE, OLECRANON;  Surgeon: Luis Davidson MD;  Location: [facility];  Service: Orthopedics;  Laterality: Right;  too instruments, k-wire and cable     C-ARM    R Breast Mass Removal Right 02/27/2020    benign    REDUCTION OF BOTH BREASTS      REMOVAL OF HARDWARE FROM UPPER EXTREMITY  05/10/2021    right arm. removal orthopedic hardware     TUBAL LIGATION  "      History reviewed. No pertinent family history.  Social History     Tobacco Use    Smoking status: Never    Smokeless tobacco: Never   Substance Use Topics    Alcohol use: No    Drug use: Never     Review of Systems   Unable to perform ROS: Mental status change       Physical Exam     Initial Vitals [09/15/23 1822]   BP Pulse Resp Temp SpO2   129/87 62 16 96.8 °F (36 °C) 100 %      MAP       --         Physical Exam    Nursing note and vitals reviewed.  Constitutional: She is not diaphoretic. No distress.   HENT:   Head: Normocephalic and atraumatic.   Dry mucous membrane   Eyes: Pupils are equal, round, and reactive to light. Right eye exhibits no discharge. Left eye exhibits no discharge.   Neck: Neck supple. No tracheal deviation present.   Cardiovascular:  Normal rate and regular rhythm.           Pulmonary/Chest: She is in respiratory distress.   Mild tachypnea   Abdominal: Abdomen is soft. There is abdominal tenderness.   Wincing with palpation of left lower abdomen   Musculoskeletal:      Cervical back: Neck supple.      Comments:        Neurological: She is alert.   Oriented x 2 - knows her name and the year, unsure of hospital name    Global weakness, but moving all extremities equally   Skin: Skin is warm. No rash noted.   Psychiatric: She has a normal mood and affect. Her behavior is normal.         ED Course   Procedures  Labs Reviewed   CBC W/ AUTO DIFFERENTIAL - Abnormal; Notable for the following components:       Result Value    RBC 6.06 (*)     Hematocrit 50.2 (*)     MCH 25.9 (*)     MCHC 31.3 (*)     RDW 14.7 (*)     MPV 13.0 (*)     Immature Granulocytes 0.6 (*)     Gran # (ANC) 9.2 (*)     Immature Grans (Abs) 0.06 (*)     Lymph # 0.8 (*)     Gran % 86.9 (*)     Lymph % 7.9 (*)     All other components within normal limits   COMPREHENSIVE METABOLIC PANEL - Abnormal; Notable for the following components:    Sodium 135 (*)     Chloride 91 (*)     Glucose 1,130 (*)     BUN 31 (*)     Creatinine  2.1 (*)     Calcium 10.9 (*)     Total Protein 8.7 (*)     Albumin 3.4 (*)     Alkaline Phosphatase 308 (*)     AST 66 (*)      (*)     eGFR 26.6 (*)     Anion Gap 18 (*)     All other components within normal limits   BETA - HYDROXYBUTYRATE, SERUM - Abnormal; Notable for the following components:    Beta-Hydroxybutyrate 1.1 (*)     All other components within normal limits   B-TYPE NATRIURETIC PEPTIDE - Abnormal; Notable for the following components:     (*)     All other components within normal limits   TROPONIN I - Abnormal; Notable for the following components:    Troponin I 0.085 (*)     All other components within normal limits   URINALYSIS, REFLEX TO URINE CULTURE - Abnormal; Notable for the following components:    Appearance, UA Hazy (*)     Protein, UA 1+ (*)     Glucose, UA 3+ (*)     Ketones, UA Trace (*)     Occult Blood UA 1+ (*)     All other components within normal limits    Narrative:     Specimen Source->Urine   TROPONIN I - Abnormal; Notable for the following components:    Troponin I 0.081 (*)     All other components within normal limits   LIPASE - Abnormal; Notable for the following components:    Lipase 77 (*)     All other components within normal limits    Narrative:     ADD ON LIPASE PER DR LOIDA ATKINSON/ORDER# [id number] @ 21:02   COMPREHENSIVE METABOLIC PANEL - Abnormal; Notable for the following components:    Potassium 2.9 (*)     Chloride 113 (*)     CO2 19 (*)     Glucose 575 (*)     BUN 21 (*)     Calcium 6.4 (*)     Total Protein 4.8 (*)     Albumin 1.9 (*)     Alkaline Phosphatase 159 (*)     ALT 65 (*)     All other components within normal limits   ACETAMINOPHEN LEVEL - Abnormal; Notable for the following components:    Acetaminophen (Tylenol), Serum <3.0 (*)     All other components within normal limits    Narrative:     ADD ON LIPASE PER DR LOIDA ATKINSON/ORDER# [id number] @ 21:02    PER CHINA BETTS MD REQUEST ADD ON HEPATITIS PANEL, ACUTE (ORDER   [id number])   09/15/2023  22:10     PER CHINA BETTS MD REQUEST ADD ON ACETAMINOPHEN LEVEL (ORDER   [id number])  09/16/2023  00:32    BASIC METABOLIC PANEL - Abnormal; Notable for the following components:    Chloride 113 (*)     CO2 14 (*)     Glucose 530 (*)     BUN 21 (*)     Calcium 7.1 (*)     All other components within normal limits    Narrative:     ADD ON CRP PER DR ZANDRA SIMS/ORDER# [id number] @ 3:58AM   CBC W/ AUTO DIFFERENTIAL - Abnormal; Notable for the following components:    Hemoglobin 11.2 (*)     MCH 25.9 (*)     MCHC 30.1 (*)     Platelets 145 (*)     Immature Granulocytes 0.7 (*)     Gran # (ANC) 8.6 (*)     Immature Grans (Abs) 0.07 (*)     Lymph # 0.9 (*)     Gran % 84.5 (*)     Lymph % 9.2 (*)     Platelet Estimate Decreased (*)     All other components within normal limits    Narrative:     ADD ON CRP PER DR ZANDRA SIMS/ORDER# [id number] @ 3:58AM   COMPREHENSIVE METABOLIC PANEL - Abnormal; Notable for the following components:    Chloride 113 (*)     CO2 14 (*)     Glucose 530 (*)     BUN 21 (*)     Calcium 7.1 (*)     Total Protein 5.5 (*)     Albumin 2.1 (*)     Alkaline Phosphatase 198 (*)     AST 53 (*)     ALT 71 (*)     All other components within normal limits    Narrative:     ADD ON CRP PER DR ZANDRA SIMS/ORDER# [id number] @ 3:58AM   ISTAT PROCEDURE - Abnormal; Notable for the following components:    POC PCO2 55.2 (*)     POC PO2 35 (*)     POC HCO3 31.5 (*)     POC SATURATED O2 63 (*)     POC TCO2 33 (*)     All other components within normal limits   ISTAT LACTATE - Abnormal; Notable for the following components:    POC Lactate 2.66 (*)     All other components within normal limits   POCT GLUCOSE - Abnormal; Notable for the following components:    POCT Glucose >500 (*)     All other components within normal limits   ISTAT PROCEDURE - Abnormal; Notable for the following components:    POC Glucose 580 (*)     POC Creatinine 0.4 (*)     POC Sodium 147 (*)     POC Potassium 2.2  (*)     POC Chloride 116 (*)     POC TCO2 (MEASURED) 16 (*)     POC Ionized Calcium 0.81 (*)     POC Hematocrit 27 (*)     All other components within normal limits   POCT GLUCOSE - Abnormal; Notable for the following components:    POCT Glucose >500 (*)     All other components within normal limits   ISTAT PROCEDURE - Abnormal; Notable for the following components:    POC Glucose 543 (*)     POC Chloride 113 (*)     POC TCO2 (MEASURED) 20 (*)     POC Ionized Calcium 0.85 (*)     POC Hematocrit 30 (*)     All other components within normal limits   POCT GLUCOSE MONITORING CONTINUOUS - Abnormal; Notable for the following components:    POC Glucose 545 (*)     All other components within normal limits   POCT GLUCOSE - Abnormal; Notable for the following components:    POCT Glucose >500 (*)     All other components within normal limits   ISTAT PROCEDURE - Abnormal; Notable for the following components:    POC Glucose 545 (*)     POC Chloride 111 (*)     POC TCO2 (MEASURED) 21 (*)     POC Ionized Calcium 1.01 (*)     POC Hematocrit 35 (*)     All other components within normal limits   CULTURE, BLOOD    Narrative:     Aerobic and anaerobic   CULTURE, BLOOD    Narrative:     Aerobic and anaerobic   AMMONIA   DRUG SCREEN PANEL, URINE EMERGENCY    Narrative:     Specimen Source->Urine   LIPASE   HEPATITIS PANEL, ACUTE   ACETAMINOPHEN LEVEL   URINALYSIS MICROSCOPIC    Narrative:     Specimen Source->Urine   HEPATITIS PANEL, ACUTE    Narrative:     ADD ON LIPASE PER DR LOIDA ATKINSON/ORDER# [id number] @ 21:02    PER CHINA BETTS MD REQUEST ADD ON HEPATITIS PANEL, ACUTE (ORDER   [id number])  09/15/2023  22:10    MAGNESIUM    Narrative:     ADD ON CRP PER DR ZANDRA SIMS/ORDER# [id number] @ 3:58AM   PHOSPHORUS    Narrative:     ADD ON CRP PER DR ZANDRA SIMS/ORDER# [id number] @ 3:58AM   LACTIC ACID, PLASMA   C-REACTIVE PROTEIN   C-REACTIVE PROTEIN    Narrative:     ADD ON CRP PER DR ZANDRA SIMS/ORDER# [id number]  @ 3:58AM   ALCOHOL,MEDICAL (ETHANOL)   HEMOGLOBIN A1C   TSH   ISTAT LACTATE   POCT GLUCOSE MONITORING CONTINUOUS   POCT GLUCOSE MONITORING CONTINUOUS     EKG Readings: (Independently Interpreted)   Initial Reading: No STEMI. Previous EKG: Compared with most recent EKG Previous EKG Date: 10/6/2022. Rhythm: Normal Sinus Rhythm. Heart Rate: 64. Ectopy: No Ectopy. Axis: Left Axis Deviation. Clinical Impression: Normal Sinus Rhythm       Imaging Results               CT Abdomen Pelvis  Without Contrast (Final result)  Result time 09/16/23 03:17:44      Final result by Nelson Valencia MD (09/16/23 03:17:44)                   Impression:      Suspected localized free intraperitoneal air adjacent to the distal descending colon, with minimal adjacent haziness, this could represent sequelae of acute diverticulitis with localized perforation, without a large amount of free intraperitoneal air, as discussed above.    Suspected enlarged lymph nodes of the right inferior axillary/chest wall region, as well as enlarged lymph nodes of the pelvis which clinical and historical correlation is needed, correlation for any history of malignancy or lymphoma or lymphoproliferative process is needed.    Indeterminate renal lesions for which follow-up MRI examination is recommended.    Suspected 2.5 cm right adnexal cyst.    Additional findings as above.    This report was flagged in Epic as abnormal.    The findings were discussed with the ER physician at the time of dictation.      Electronically signed by: Nelson Valencia  Date:    09/16/2023  Time:    03:17               Narrative:    EXAMINATION:  CT ABDOMEN PELVIS WITHOUT CONTRAST    CLINICAL HISTORY:  LLQ abdominal pain;    TECHNIQUE:  Low dose axial images, sagittal and coronal reformations were obtained from the lung bases to the pubic symphysis.  Intravenous contrast and oral contrast was not utilized, this diminishes the sensitivity of the  examination.    COMPARISON:  None    FINDINGS:  The lung bases demonstrate mild motion artifact, there is atelectatic change noted as well.  There are soft tissue nodule seen within the right lateral chest wall, as seen on axial image 1 measuring up to 15.2 x 19.9 mm, and on axial image 6 measuring up to 27.3 x 17.4 mm.  These may relate to soft tissue lesions, along the posterolateral right breast, could represent enlarged lymph nodes, clinical and historical correlation is needed.    There also appear to be enlarged pelvic lymph nodes noted, as seen on axial image 142 there is a enlarged lymph node of the right pelvis measuring up to approximately 22.7 x 15.7 mm, and on axial image 142 there is enlarged lymph node of the left pelvis measuring up to approximately 13.3 x 24.4 mm.    The stomach is decompressed.  There is no evidence for pericholecystic or peripancreatic inflammatory change there is no abnormal pancreatic or biliary ductal dilatation.  Mild heterogeneity of the liver may relate to diffuse fatty infiltrate with geographic fatty infiltrate in fatty sparing.  There is no evidence for acute process of the spleen.  There is bilateral adrenal gland thickening, nonspecific appearance.    There is no evidence for hydronephrosis or obstructive uropathy or ureteral calculus bilaterally.  Nonobstructing nephrolithiasis of the right kidney is noted.  There are renal lesions bilaterally, of variable attenuation character including hyperdense lesions, the most prominent on the left measures approximately 18.4 mm on axial imaging and measures approximately 77 Hounsfield units, there is a lesion on the right measuring up to 28.5 mm on axial imaging and 48 Hounsfield units.  If previously unknown renal mass protocol MRI follow-up is recommended.    The abdominal aorta appears normal in caliber, there is mild tortuosity noted.  The urinary bladder appears unremarkable for degree of distention.  Patient appears status  post hysterectomy.  There is a suspected right adnexal cyst measuring approximately 2.5 cm.    There are a few mildly prominent small bowel loops, not a pattern to suggest obstruction, may relate to ileus.  The appendix is identified, it does not appear inflamed.    There are diverticula of the colon noted.  As seen on axial image 127, coronal image 92, there is an area of air density adjacent to the descending colon, near the junction of the descending colon and proximal sigmoid colon, the appearance is most concerning for extraluminal air, the possibly that this represents air within a prominent distended diverticulum is a consideration although thought less likely.  This may relate to a focal localized perforation, minimal haziness at this level is noted, could represent sequelae of diverticulitis without a large degree of inflammatory change.  Additional significant free intraperitoneal air is not seen.  Clinical and historical correlation is needed.    The osseous structures demonstrate chronic appearing change.  There are remote right-sided rib fractures noted.  There is curvature of the spine noted.                                       CT Head Without Contrast (Final result)  Result time 09/16/23 02:48:34      Final result by Nelson Valencia MD (09/16/23 02:48:34)                   Impression:      Chronic change noted, there is no evidence for superimposed acute intracranial process.      Electronically signed by: Nelson Valencia  Date:    09/16/2023  Time:    02:48               Narrative:    EXAMINATION:  CT HEAD WITHOUT CONTRAST    CLINICAL HISTORY:  Mental status change, unknown cause;    TECHNIQUE:  Low dose axial images were obtained through the head.  Coronal and sagittal reformations were also performed. Contrast was not administered.    COMPARISON:  CT examination of the brain June 30, 2022.    FINDINGS:  The ventricular system, sulcal pattern and parenchymal attenuation characteristics are  consistent with chronic change.  Involutional change is noted, chronic appearing white matter changes noted.  Midline falcine calcification is noted, small focal hypodensity along the midline falx may relate to a tiny midline falcine lipoma, appearing stable.    There is no evidence for superimposed intracranial mass, mass effect or midline shift and no evidence for acute intracranial hemorrhage.  Appropriate CSF spaces are seen at the skull base.    The visualized orbits appear intact.  The mastoid air cells appear well aerated.  Mild paranasal sinus mucosal thickening is noted.  The osseous structures appear intact.                                       X-Ray Chest AP Portable (Final result)  Result time 09/15/23 19:35:09      Final result by Jose Hanley MD (09/15/23 19:35:09)                   Impression:      1. Central hilar findings may reflect early congestive change, no large focal consolidation.      Electronically signed by: Jose Hanley MD  Date:    09/15/2023  Time:    19:35               Narrative:    EXAMINATION:  XR CHEST AP PORTABLE    CLINICAL HISTORY:  hyperglycemia;    TECHNIQUE:  Single frontal view of the chest was performed.    COMPARISON:  10/06/2022    FINDINGS:  The cardiomediastinal silhouette is prominent noting magnification by technique..  There is no pleural effusion.  The trachea is midline.  The lungs are symmetrically expanded bilaterally with minimally coarse central hilar interstitial attenuation, less conspicuous than on the previous exam..  No large focal consolidation seen.  There is no pneumothorax.  The osseous structures are remarkable for dextroscoliotic curvature of the spine.  Surgical changes are noted of the proximal right humerus..                                       Medications   sodium chloride 0.9% flush 10 mL (has no administration in time range)   dextrose 5 % and 0.45 % NaCl infusion (has no administration in time range)   dextrose 10% bolus 250 mL  250 mL (has no administration in time range)   dextrose 10% bolus 125 mL 125 mL (has no administration in time range)   aspirin tablet 325 mg (0 mg Oral Hold 9/15/23 2115)   labetaloL tablet 100 mg (100 mg Oral Given 9/15/23 2140)   sodium chloride 0.9% flush 10 mL (has no administration in time range)   0.9%  NaCl infusion (0 mL/hr Intravenous Handoff 9/16/23 0545)   dextrose 5 % and 0.45 % NaCl infusion (has no administration in time range)   ondansetron injection 4 mg (has no administration in time range)   acetaminophen tablet 650 mg (has no administration in time range)   heparin (porcine) injection 5,000 Units (5,000 Units Subcutaneous Not Given 9/16/23 0415)   melatonin tablet 6 mg (has no administration in time range)   famotidine (PF) injection 20 mg (has no administration in time range)   prochlorperazine injection Soln 5 mg (has no administration in time range)   polyethylene glycol packet 17 g (has no administration in time range)   dextrose 10% bolus 125 mL 125 mL (has no administration in time range)   dextrose 10% bolus 250 mL 250 mL (has no administration in time range)   ciprofloxacin (CIPRO)400mg/200ml D5W IVPB 400 mg (has no administration in time range)   metronidazole IVPB 500 mg (has no administration in time range)   insulin regular in 0.9 % NaCl 100 unit/100 mL (1 unit/mL) infusion (0.1 Units/kg/hr × 90.7 kg Intravenous Rate/Dose Change 9/16/23 0550)   potassium chloride 10 mEq in 100 mL IVPB (has no administration in time range)   sodium chloride 0.9% bolus 1,000 mL 1,000 mL (has no administration in time range)   sodium chloride 0.9% bolus 1,000 mL 1,000 mL (0 mLs Intravenous Stopped 9/15/23 2032)   vancomycin 1,250 mg in dextrose 5 % (D5W) 250 mL IVPB (Vial-Mate) (0 mg Intravenous Stopped 9/15/23 2305)   ceFEPIme (MAXIPIME) 1 g in dextrose 5 % in water (D5W) 100 mL IVPB (MB+) (0 g Intravenous Stopped 9/15/23 2141)   0.9 % NaCl with KCl 20 mEq infusion (75 mL/hr Intravenous Handoff 9/16/23  0545)   potassium chloride 10 mEq in 100 mL IVPB (0 mEq Intravenous Stopped 9/16/23 0053)   potassium bicarbonate disintegrating tablet 40 mEq (40 mEq Oral Given 9/15/23 2320)   sodium chloride 0.9% bolus 1,000 mL 1,000 mL (0 mLs Intravenous Stopped 9/16/23 0052)   LORazepam injection 1 mg (1 mg Intravenous Given 9/16/23 0135)     Medical Decision Making  Emergent evaluation of a 57-year-old female with multiple co-morbidities presenting with altered mental status, hyperglycemia.  On arrival, she is awake, altered (oriented x2), nonfocal neurologic exam, protecting airway.    Differential including, but not limited to:  DKA,  HHS, UTI, intracranial hemorrhage, does not examine as TIA/CVA, sepsis,     Labs reviewed:  CTNI elevated (could be demand in the setting of ROBERTO/acute illness, ordered for ASA and to trend), ROBERTO, hyperglycemia (elevated AG and BHB, trace ketones, normal pH - mixed and could be DKA/HHS).  Initiated insulin gtt with K-containing fluids. Patient also received IVF for dehydration and hyperglycemia on arrival.  Will plan for CTH given altered mental status, as well as CT A/P given her exam.  No leukocytosis, no fever, but will initiate infectious w/u as possible etiology of hyperglycemia and cover with broad spectrum abx.   Fluid challenge Not needed - patient is not hypotensive.     Post- resuscitation assessment Yes Perfusion exam was performed within 6 hours of septic shock presentation after bolus shows Adequate tissue perfusion assessed by non-invasive monitoring. Mental status improving at the time of sign out.     LFTs are abnormal - added tylenol level, hepatitis panel, ETOH for further w/u. Initially altered, trying to climb out of bed, sitter ordered and soft restraints for patient's safety.       Signed out to oncoming attending at  with remaining labs and imaging pending, patient will require admission.             Amount and/or Complexity of Data Reviewed  External Data Reviewed:  radiology.     Details: 2022 ECHO:  The left ventricle is normal in size with concentric hypertrophy and normal systolic function. The estimated ejection fraction is 55%.  Labs: ordered. Decision-making details documented in ED Course.  Radiology: ordered and independent interpretation performed. Decision-making details documented in ED Course.  ECG/medicine tests: ordered and independent interpretation performed.    Risk  OTC drugs.  Prescription drug management.  Decision regarding hospitalization.              Attending Attestation:         Attending Critical Care:   Critical Care Times:   Direct Patient Care (initial evaluation, reassessments, and time considering the case)................................................................14 minutes.   Additional History from reviewing old medical records or taking additional history from the family, EMS, PCP, etc.......................5 minutes.   Ordering, Reviewing, and Interpreting Diagnostic Studies...............................................................................................................6 minutes.   Documentation..................................................................................................................................................................................6 minutes.   ==============================================================  Total Critical Care Time - exclusive of procedural time: 31 minutes.  ==============================================================  Critical care was necessary to treat or prevent imminent or life-threatening deterioration of the following conditions: endocrine crisis.   Critical care was time spent personally by me on the following activities: obtaining history from patient or relative, examination of patient, review of old charts, ordering lab, x-rays, and/or EKG, ordering and performing treatments and interventions, evaluation of patient's response to treatment, re-evaluation  of patient's conition and interpretation of cardiac measurements.   Critical Care Condition: potentially life-threatening           ED Course as of 09/16/23 0614   Fri Sep 15, 2023   1925 POC PH: 7.364 [AB]   2003 WBC: 10.61  No leukocytosis  [AB]   2004 CXR on my independent interpretation w/o acute infiltrate [AB]   2019 Potassium: 4.7  Normal  [AB]   2020 Creatinine(!): 2.1  ROBERTO, most recent 0.7 [AB]   2024 Troponin I(!): 0.085  Abnormal, could be secondary to ROBERTO, but will give aspirin and trend.  [AB]   2025 Anion Gap(!): 18 [AB]   2025 LFTs are abnormal  [AB]   2046 Called the lab - CMP result did not include glucose level, but POC was > 500 and she has an AG elevation.  Ordered for insulin gtt and K-containing IVF.  [AB]      ED Course User Index  [AB] Du Palumbo MD                      Clinical Impression:   Final diagnoses:  [R73.9] Hyperglycemia  [R79.89] Abnormal LFTs (Primary)  [R41.82] Altered mental status, unspecified altered mental status type  [N17.9] ROBERTO (acute kidney injury)        ED Disposition Condition    Admit Stable                Du Palumbo MD  09/16/23 0642

## 2023-09-15 NOTE — Clinical Note
Diagnosis: DKA (diabetic ketoacidosis) [301003]   Admitting Provider:: ZANDRA SIMS [[id number]]   Future Attending Provider: ZANDRA SIMS [[id number]]   Reason for IP Medical Treatment  (Clinical interventions that can only be accomplished in the IP setting? ) :: DKA   I certify that Inpatient services for greater than or equal to 2 midnights are medically necessary:: Yes   Plans for Post-Acute care--if anticipated (pick the single best option):: A. No post acute care anticipated at this time   Special Needs:: No Special Needs [1]

## 2023-09-16 PROBLEM — K21.9 GERD (GASTROESOPHAGEAL REFLUX DISEASE): Chronic | Status: ACTIVE | Noted: 2022-10-06

## 2023-09-16 PROBLEM — K57.92 DIVERTICULITIS: Status: ACTIVE | Noted: 2023-09-16

## 2023-09-16 PROBLEM — R79.89 ELEVATED BRAIN NATRIURETIC PEPTIDE (BNP) LEVEL: Status: ACTIVE | Noted: 2023-09-16

## 2023-09-16 PROBLEM — E11.10 DKA (DIABETIC KETOACIDOSIS): Status: ACTIVE | Noted: 2023-09-16

## 2023-09-16 PROBLEM — I10 HTN (HYPERTENSION): Chronic | Status: ACTIVE | Noted: 2022-04-14

## 2023-09-16 PROBLEM — J44.9 COPD (CHRONIC OBSTRUCTIVE PULMONARY DISEASE): Chronic | Status: ACTIVE | Noted: 2022-10-06

## 2023-09-16 PROBLEM — N17.9 AKI (ACUTE KIDNEY INJURY): Status: ACTIVE | Noted: 2023-09-16

## 2023-09-16 PROBLEM — E78.5 HYPERLIPIDEMIA: Chronic | Status: ACTIVE | Noted: 2022-10-06

## 2023-09-16 LAB
ALBUMIN SERPL BCP-MCNC: 1.9 G/DL (ref 3.5–5.2)
ALBUMIN SERPL BCP-MCNC: 2.1 G/DL (ref 3.5–5.2)
ALLENS TEST: ABNORMAL
ALLENS TEST: ABNORMAL
ALLENS TEST: NORMAL
ALP SERPL-CCNC: 159 U/L (ref 55–135)
ALP SERPL-CCNC: 198 U/L (ref 55–135)
ALT SERPL W/O P-5'-P-CCNC: 65 U/L (ref 10–44)
ALT SERPL W/O P-5'-P-CCNC: 71 U/L (ref 10–44)
ANION GAP SERPL CALC-SCNC: 11 MMOL/L (ref 8–16)
ANION GAP SERPL CALC-SCNC: 14 MMOL/L (ref 8–16)
ANION GAP SERPL CALC-SCNC: 16 MMOL/L (ref 8–16)
ANION GAP SERPL CALC-SCNC: 16 MMOL/L (ref 8–16)
ANION GAP SERPL CALC-SCNC: 8 MMOL/L (ref 8–16)
ANION GAP SERPL CALC-SCNC: 9 MMOL/L (ref 8–16)
ANISOCYTOSIS BLD QL SMEAR: SLIGHT
APAP SERPL-MCNC: <3 UG/ML (ref 10–20)
AST SERPL-CCNC: 40 U/L (ref 10–40)
AST SERPL-CCNC: 53 U/L (ref 10–40)
BASOPHILS # BLD AUTO: 0.01 K/UL (ref 0–0.2)
BASOPHILS NFR BLD: 0.1 % (ref 0–1.9)
BILIRUB SERPL-MCNC: 0.2 MG/DL (ref 0.1–1)
BILIRUB SERPL-MCNC: 0.2 MG/DL (ref 0.1–1)
BUN SERPL-MCNC: 15 MG/DL (ref 6–20)
BUN SERPL-MCNC: 16 MG/DL (ref 6–20)
BUN SERPL-MCNC: 18 MG/DL (ref 6–20)
BUN SERPL-MCNC: 21 MG/DL (ref 6–20)
BUN SERPL-MCNC: 23 MG/DL (ref 6–30)
BUN SERPL-MCNC: 25 MG/DL (ref 6–30)
CALCIUM SERPL-MCNC: 6.4 MG/DL (ref 8.7–10.5)
CALCIUM SERPL-MCNC: 7.1 MG/DL (ref 8.7–10.5)
CALCIUM SERPL-MCNC: 7.1 MG/DL (ref 8.7–10.5)
CALCIUM SERPL-MCNC: 8.4 MG/DL (ref 8.7–10.5)
CALCIUM SERPL-MCNC: 8.7 MG/DL (ref 8.7–10.5)
CALCIUM SERPL-MCNC: 9.1 MG/DL (ref 8.7–10.5)
CHLORIDE SERPL-SCNC: 111 MMOL/L (ref 95–110)
CHLORIDE SERPL-SCNC: 112 MMOL/L (ref 95–110)
CHLORIDE SERPL-SCNC: 112 MMOL/L (ref 95–110)
CHLORIDE SERPL-SCNC: 113 MMOL/L (ref 95–110)
CO2 SERPL-SCNC: 14 MMOL/L (ref 23–29)
CO2 SERPL-SCNC: 14 MMOL/L (ref 23–29)
CO2 SERPL-SCNC: 19 MMOL/L (ref 23–29)
CO2 SERPL-SCNC: 21 MMOL/L (ref 23–29)
CO2 SERPL-SCNC: 27 MMOL/L (ref 23–29)
CO2 SERPL-SCNC: 29 MMOL/L (ref 23–29)
CREAT SERPL-MCNC: 0.6 MG/DL (ref 0.5–1.4)
CREAT SERPL-MCNC: 0.7 MG/DL (ref 0.5–1.4)
CREAT SERPL-MCNC: 0.8 MG/DL (ref 0.5–1.4)
CREAT SERPL-MCNC: 1 MG/DL (ref 0.5–1.4)
CREAT SERPL-MCNC: 1 MG/DL (ref 0.5–1.4)
CRP SERPL-MCNC: 4.5 MG/L (ref 0–8.2)
DELSYS: NORMAL
DIFFERENTIAL METHOD: ABNORMAL
EOSINOPHIL # BLD AUTO: 0 K/UL (ref 0–0.5)
EOSINOPHIL NFR BLD: 0.2 % (ref 0–8)
ERYTHROCYTE [DISTWIDTH] IN BLOOD BY AUTOMATED COUNT: 13.9 % (ref 11.5–14.5)
EST. GFR  (NO RACE VARIABLE): >60 ML/MIN/1.73 M^2
GLUCOSE SERPL-MCNC: 293 MG/DL (ref 70–110)
GLUCOSE SERPL-MCNC: 322 MG/DL (ref 70–110)
GLUCOSE SERPL-MCNC: 324 MG/DL (ref 70–110)
GLUCOSE SERPL-MCNC: 530 MG/DL (ref 70–110)
GLUCOSE SERPL-MCNC: 530 MG/DL (ref 70–110)
GLUCOSE SERPL-MCNC: 543 MG/DL (ref 70–110)
GLUCOSE SERPL-MCNC: 545 MG/DL (ref 70–110)
GLUCOSE SERPL-MCNC: 545 MG/DL (ref 70–110)
GLUCOSE SERPL-MCNC: 575 MG/DL (ref 70–110)
HCO3 UR-SCNC: 29.1 MMOL/L (ref 24–28)
HCO3 UR-SCNC: 29.4 MMOL/L (ref 24–28)
HCT VFR BLD AUTO: 37.2 % (ref 37–48.5)
HCT VFR BLD CALC: 30 %PCV (ref 36–54)
HCT VFR BLD CALC: 35 %PCV (ref 36–54)
HCT VFR BLD CALC: 39 %PCV (ref 36–54)
HCT VFR BLD CALC: 42 %PCV (ref 36–54)
HGB BLD-MCNC: 11.2 G/DL (ref 12–16)
IMM GRANULOCYTES # BLD AUTO: 0.07 K/UL (ref 0–0.04)
IMM GRANULOCYTES NFR BLD AUTO: 0.7 % (ref 0–0.5)
LACTATE SERPL-SCNC: 1.5 MMOL/L (ref 0.5–2.2)
LDH SERPL L TO P-CCNC: 2.14 MMOL/L (ref 0.5–2.2)
LYMPHOCYTES # BLD AUTO: 0.9 K/UL (ref 1–4.8)
LYMPHOCYTES NFR BLD: 9.2 % (ref 18–48)
MAGNESIUM SERPL-MCNC: 1.8 MG/DL (ref 1.6–2.6)
MCH RBC QN AUTO: 25.9 PG (ref 27–31)
MCHC RBC AUTO-ENTMCNC: 30.1 G/DL (ref 32–36)
MCV RBC AUTO: 86 FL (ref 82–98)
MONOCYTES # BLD AUTO: 0.5 K/UL (ref 0.3–1)
MONOCYTES NFR BLD: 5.3 % (ref 4–15)
NEUTROPHILS # BLD AUTO: 8.6 K/UL (ref 1.8–7.7)
NEUTROPHILS NFR BLD: 84.5 % (ref 38–73)
NRBC BLD-RTO: 0 /100 WBC
PCO2 BLDA: 50.8 MMHG (ref 35–45)
PCO2 BLDA: 56.4 MMHG (ref 35–45)
PH SMN: 7.33 [PH] (ref 7.35–7.45)
PH SMN: 7.37 [PH] (ref 7.35–7.45)
PHOSPHATE SERPL-MCNC: 2.7 MG/DL (ref 2.7–4.5)
PLATELET # BLD AUTO: 145 K/UL (ref 150–450)
PLATELET BLD QL SMEAR: ABNORMAL
PMV BLD AUTO: 12.4 FL (ref 9.2–12.9)
PO2 BLDA: 35 MMHG (ref 40–60)
PO2 BLDA: 41 MMHG (ref 40–60)
POC BE: 3 MMOL/L
POC BE: 4 MMOL/L
POC IONIZED CALCIUM: 0.85 MMOL/L (ref 1.06–1.42)
POC IONIZED CALCIUM: 1.01 MMOL/L (ref 1.06–1.42)
POC IONIZED CALCIUM: 1.24 MMOL/L (ref 1.06–1.42)
POC IONIZED CALCIUM: 1.26 MMOL/L (ref 1.06–1.42)
POC SATURATED O2: 62 % (ref 95–100)
POC SATURATED O2: 73 % (ref 95–100)
POC TCO2 (MEASURED): 20 MMOL/L (ref 23–29)
POC TCO2 (MEASURED): 21 MMOL/L (ref 23–29)
POC TCO2: 31 MMOL/L (ref 24–29)
POC TCO2: 31 MMOL/L (ref 24–29)
POCT GLUCOSE: 251 MG/DL (ref 70–110)
POCT GLUCOSE: 268 MG/DL (ref 70–110)
POCT GLUCOSE: 274 MG/DL (ref 70–110)
POCT GLUCOSE: 286 MG/DL (ref 70–110)
POCT GLUCOSE: 286 MG/DL (ref 70–110)
POCT GLUCOSE: 295 MG/DL (ref 70–110)
POCT GLUCOSE: 299 MG/DL (ref 70–110)
POCT GLUCOSE: 304 MG/DL (ref 70–110)
POCT GLUCOSE: 306 MG/DL (ref 70–110)
POCT GLUCOSE: 306 MG/DL (ref 70–110)
POCT GLUCOSE: 310 MG/DL (ref 70–110)
POCT GLUCOSE: 316 MG/DL (ref 70–110)
POCT GLUCOSE: 323 MG/DL (ref 70–110)
POCT GLUCOSE: 342 MG/DL (ref 70–110)
POCT GLUCOSE: 404 MG/DL (ref 70–110)
POCT GLUCOSE: 405 MG/DL (ref 70–110)
POCT GLUCOSE: 455 MG/DL (ref 70–110)
POCT GLUCOSE: 461 MG/DL (ref 70–110)
POCT GLUCOSE: >500 MG/DL (ref 70–110)
POCT GLUCOSE: >500 MG/DL (ref 70–110)
POTASSIUM BLD-SCNC: 3.8 MMOL/L (ref 3.5–5.1)
POTASSIUM BLD-SCNC: 3.9 MMOL/L (ref 3.5–5.1)
POTASSIUM BLD-SCNC: 4 MMOL/L (ref 3.5–5.1)
POTASSIUM BLD-SCNC: 4.4 MMOL/L (ref 3.5–5.1)
POTASSIUM SERPL-SCNC: 2.9 MMOL/L (ref 3.5–5.1)
POTASSIUM SERPL-SCNC: 3.6 MMOL/L (ref 3.5–5.1)
POTASSIUM SERPL-SCNC: 3.9 MMOL/L (ref 3.5–5.1)
POTASSIUM SERPL-SCNC: 4 MMOL/L (ref 3.5–5.1)
POTASSIUM SERPL-SCNC: 4.2 MMOL/L (ref 3.5–5.1)
POTASSIUM SERPL-SCNC: 4.2 MMOL/L (ref 3.5–5.1)
PROT SERPL-MCNC: 4.8 G/DL (ref 6–8.4)
PROT SERPL-MCNC: 5.5 G/DL (ref 6–8.4)
RBC # BLD AUTO: 4.33 M/UL (ref 4–5.4)
SAMPLE: ABNORMAL
SAMPLE: NORMAL
SITE: ABNORMAL
SITE: ABNORMAL
SITE: NORMAL
SODIUM BLD-SCNC: 144 MMOL/L (ref 136–145)
SODIUM BLD-SCNC: 145 MMOL/L (ref 136–145)
SODIUM BLD-SCNC: 147 MMOL/L (ref 136–145)
SODIUM BLD-SCNC: 149 MMOL/L (ref 136–145)
SODIUM SERPL-SCNC: 140 MMOL/L (ref 136–145)
SODIUM SERPL-SCNC: 143 MMOL/L (ref 136–145)
SODIUM SERPL-SCNC: 143 MMOL/L (ref 136–145)
SODIUM SERPL-SCNC: 148 MMOL/L (ref 136–145)
SODIUM SERPL-SCNC: 150 MMOL/L (ref 136–145)
SODIUM SERPL-SCNC: 150 MMOL/L (ref 136–145)
WBC # BLD AUTO: 10.16 K/UL (ref 3.9–12.7)

## 2023-09-16 PROCEDURE — 99223 1ST HOSP IP/OBS HIGH 75: CPT | Mod: ,,, | Performed by: STUDENT IN AN ORGANIZED HEALTH CARE EDUCATION/TRAINING PROGRAM

## 2023-09-16 PROCEDURE — 63600175 PHARM REV CODE 636 W HCPCS: Performed by: STUDENT IN AN ORGANIZED HEALTH CARE EDUCATION/TRAINING PROGRAM

## 2023-09-16 PROCEDURE — 27000221 HC OXYGEN, UP TO 24 HOURS

## 2023-09-16 PROCEDURE — 99900035 HC TECH TIME PER 15 MIN (STAT)

## 2023-09-16 PROCEDURE — 86140 C-REACTIVE PROTEIN: CPT

## 2023-09-16 PROCEDURE — 83735 ASSAY OF MAGNESIUM: CPT

## 2023-09-16 PROCEDURE — 99223 1ST HOSP IP/OBS HIGH 75: CPT | Mod: ,,, | Performed by: HOSPITALIST

## 2023-09-16 PROCEDURE — 36415 COLL VENOUS BLD VENIPUNCTURE: CPT

## 2023-09-16 PROCEDURE — 85025 COMPLETE CBC W/AUTO DIFF WBC: CPT

## 2023-09-16 PROCEDURE — 84100 ASSAY OF PHOSPHORUS: CPT

## 2023-09-16 PROCEDURE — 25000003 PHARM REV CODE 250

## 2023-09-16 PROCEDURE — 63600175 PHARM REV CODE 636 W HCPCS

## 2023-09-16 PROCEDURE — 94761 N-INVAS EAR/PLS OXIMETRY MLT: CPT

## 2023-09-16 PROCEDURE — 80053 COMPREHEN METABOLIC PANEL: CPT

## 2023-09-16 PROCEDURE — 99223 PR INITIAL HOSPITAL CARE,LEVL III: ICD-10-PCS | Mod: ,,, | Performed by: STUDENT IN AN ORGANIZED HEALTH CARE EDUCATION/TRAINING PROGRAM

## 2023-09-16 PROCEDURE — 82803 BLOOD GASES ANY COMBINATION: CPT

## 2023-09-16 PROCEDURE — 83605 ASSAY OF LACTIC ACID: CPT

## 2023-09-16 PROCEDURE — 80048 BASIC METABOLIC PNL TOTAL CA: CPT | Mod: 91,XB | Performed by: HOSPITALIST

## 2023-09-16 PROCEDURE — 99223 PR INITIAL HOSPITAL CARE,LEVL III: ICD-10-PCS | Mod: ,,, | Performed by: HOSPITALIST

## 2023-09-16 PROCEDURE — 80048 BASIC METABOLIC PNL TOTAL CA: CPT | Mod: XB

## 2023-09-16 PROCEDURE — 20600001 HC STEP DOWN PRIVATE ROOM

## 2023-09-16 RX ORDER — TALC
6 POWDER (GRAM) TOPICAL NIGHTLY PRN
Status: DISCONTINUED | OUTPATIENT
Start: 2023-09-16 | End: 2023-09-22 | Stop reason: HOSPADM

## 2023-09-16 RX ORDER — DEXTROSE MONOHYDRATE AND SODIUM CHLORIDE 5; .45 G/100ML; G/100ML
125 INJECTION, SOLUTION INTRAVENOUS CONTINUOUS PRN
Status: DISCONTINUED | OUTPATIENT
Start: 2023-09-16 | End: 2023-09-18

## 2023-09-16 RX ORDER — SODIUM CHLORIDE 9 MG/ML
125 INJECTION, SOLUTION INTRAVENOUS CONTINUOUS
Status: DISCONTINUED | OUTPATIENT
Start: 2023-09-16 | End: 2023-09-17

## 2023-09-16 RX ORDER — LORAZEPAM 2 MG/ML
1 INJECTION INTRAMUSCULAR
Status: COMPLETED | OUTPATIENT
Start: 2023-09-16 | End: 2023-09-16

## 2023-09-16 RX ORDER — ALBUTEROL SULFATE 90 UG/1
2 AEROSOL, METERED RESPIRATORY (INHALATION) 2 TIMES DAILY
COMMUNITY
Start: 2023-09-08

## 2023-09-16 RX ORDER — AMLODIPINE BESYLATE 10 MG/1
10 TABLET ORAL DAILY
Status: ON HOLD | COMMUNITY
Start: 2023-06-27 | End: 2023-09-21 | Stop reason: HOSPADM

## 2023-09-16 RX ORDER — HYDRALAZINE HYDROCHLORIDE 20 MG/ML
10 INJECTION INTRAMUSCULAR; INTRAVENOUS EVERY 6 HOURS PRN
Status: DISCONTINUED | OUTPATIENT
Start: 2023-09-16 | End: 2023-09-17

## 2023-09-16 RX ORDER — METOPROLOL SUCCINATE 100 MG/1
100 TABLET, EXTENDED RELEASE ORAL DAILY
COMMUNITY
Start: 2023-09-08

## 2023-09-16 RX ORDER — CIPROFLOXACIN 2 MG/ML
400 INJECTION, SOLUTION INTRAVENOUS
Status: DISCONTINUED | OUTPATIENT
Start: 2023-09-16 | End: 2023-09-18

## 2023-09-16 RX ORDER — METRONIDAZOLE 500 MG/100ML
500 INJECTION, SOLUTION INTRAVENOUS
Status: DISCONTINUED | OUTPATIENT
Start: 2023-09-16 | End: 2023-09-18

## 2023-09-16 RX ORDER — HEPARIN SODIUM 5000 [USP'U]/ML
5000 INJECTION, SOLUTION INTRAVENOUS; SUBCUTANEOUS
Status: DISCONTINUED | OUTPATIENT
Start: 2023-09-16 | End: 2023-09-18

## 2023-09-16 RX ORDER — LOSARTAN POTASSIUM AND HYDROCHLOROTHIAZIDE 25; 100 MG/1; MG/1
1 TABLET ORAL DAILY
COMMUNITY
Start: 2023-09-08

## 2023-09-16 RX ORDER — GLUCAGON 1 MG
1 KIT INJECTION
Status: DISCONTINUED | OUTPATIENT
Start: 2023-09-16 | End: 2023-09-17

## 2023-09-16 RX ORDER — PROCHLORPERAZINE EDISYLATE 5 MG/ML
5 INJECTION INTRAMUSCULAR; INTRAVENOUS EVERY 6 HOURS PRN
Status: DISCONTINUED | OUTPATIENT
Start: 2023-09-16 | End: 2023-09-22 | Stop reason: HOSPADM

## 2023-09-16 RX ORDER — HYDROCHLOROTHIAZIDE 25 MG/1
25 TABLET ORAL DAILY
Status: ON HOLD | COMMUNITY
Start: 2023-06-27 | End: 2023-09-21 | Stop reason: HOSPADM

## 2023-09-16 RX ORDER — SODIUM CHLORIDE 0.9 % (FLUSH) 0.9 %
10 SYRINGE (ML) INJECTION
Status: DISCONTINUED | OUTPATIENT
Start: 2023-09-16 | End: 2023-09-22 | Stop reason: HOSPADM

## 2023-09-16 RX ORDER — POTASSIUM CHLORIDE 7.45 MG/ML
10 INJECTION INTRAVENOUS
Status: DISPENSED | OUTPATIENT
Start: 2023-09-16 | End: 2023-09-16

## 2023-09-16 RX ORDER — ONDANSETRON 2 MG/ML
4 INJECTION INTRAMUSCULAR; INTRAVENOUS EVERY 6 HOURS PRN
Status: DISCONTINUED | OUTPATIENT
Start: 2023-09-16 | End: 2023-09-22 | Stop reason: HOSPADM

## 2023-09-16 RX ORDER — INSULIN ASPART 100 [IU]/ML
0-5 INJECTION, SOLUTION INTRAVENOUS; SUBCUTANEOUS EVERY 6 HOURS PRN
Status: DISCONTINUED | OUTPATIENT
Start: 2023-09-16 | End: 2023-09-16

## 2023-09-16 RX ORDER — FAMOTIDINE 10 MG/ML
20 INJECTION INTRAVENOUS EVERY 12 HOURS
Status: DISCONTINUED | OUTPATIENT
Start: 2023-09-16 | End: 2023-09-18

## 2023-09-16 RX ORDER — OLANZAPINE 10 MG/2ML
2.5 INJECTION, POWDER, FOR SOLUTION INTRAMUSCULAR ONCE
Status: DISCONTINUED | OUTPATIENT
Start: 2023-09-16 | End: 2023-09-16

## 2023-09-16 RX ORDER — POLYETHYLENE GLYCOL 3350 17 G/17G
17 POWDER, FOR SOLUTION ORAL DAILY PRN
Status: DISCONTINUED | OUTPATIENT
Start: 2023-09-16 | End: 2023-09-19

## 2023-09-16 RX ORDER — HYDRALAZINE HYDROCHLORIDE 20 MG/ML
5 INJECTION INTRAMUSCULAR; INTRAVENOUS EVERY 6 HOURS PRN
Status: DISCONTINUED | OUTPATIENT
Start: 2023-09-16 | End: 2023-09-16

## 2023-09-16 RX ORDER — ACETAMINOPHEN 325 MG/1
650 TABLET ORAL EVERY 4 HOURS PRN
Status: DISCONTINUED | OUTPATIENT
Start: 2023-09-16 | End: 2023-09-19

## 2023-09-16 RX ORDER — OLANZAPINE 10 MG/2ML
2.5 INJECTION, POWDER, FOR SOLUTION INTRAMUSCULAR ONCE AS NEEDED
Status: DISCONTINUED | OUTPATIENT
Start: 2023-09-16 | End: 2023-09-22 | Stop reason: HOSPADM

## 2023-09-16 RX ADMIN — METRONIDAZOLE 500 MG: 5 INJECTION, SOLUTION INTRAVENOUS at 09:09

## 2023-09-16 RX ADMIN — FAMOTIDINE 20 MG: 10 INJECTION, SOLUTION INTRAVENOUS at 08:09

## 2023-09-16 RX ADMIN — INSULIN HUMAN 0.01 UNITS/KG/HR: 1 INJECTION, SOLUTION INTRAVENOUS at 12:09

## 2023-09-16 RX ADMIN — HYDRALAZINE HYDROCHLORIDE 10 MG: 20 INJECTION, SOLUTION INTRAMUSCULAR; INTRAVENOUS at 08:09

## 2023-09-16 RX ADMIN — CIPROFLOXACIN 400 MG: 400 INJECTION, SOLUTION INTRAVENOUS at 08:09

## 2023-09-16 RX ADMIN — SODIUM CHLORIDE 125 ML/HR: 9 INJECTION, SOLUTION INTRAVENOUS at 07:09

## 2023-09-16 RX ADMIN — HEPARIN SODIUM 5000 UNITS: 5000 INJECTION INTRAVENOUS; SUBCUTANEOUS at 08:09

## 2023-09-16 RX ADMIN — HYDRALAZINE HYDROCHLORIDE 5 MG: 20 INJECTION INTRAMUSCULAR; INTRAVENOUS at 12:09

## 2023-09-16 RX ADMIN — LORAZEPAM 1 MG: 2 INJECTION INTRAMUSCULAR; INTRAVENOUS at 01:09

## 2023-09-16 RX ADMIN — SODIUM CHLORIDE 125 ML/HR: 9 INJECTION, SOLUTION INTRAVENOUS at 03:09

## 2023-09-16 RX ADMIN — METRONIDAZOLE 500 MG: 5 INJECTION, SOLUTION INTRAVENOUS at 08:09

## 2023-09-16 RX ADMIN — INSULIN DETEMIR 11 UNITS: 100 INJECTION, SOLUTION SUBCUTANEOUS at 02:09

## 2023-09-16 RX ADMIN — SODIUM CHLORIDE 125 ML/HR: 9 INJECTION, SOLUTION INTRAVENOUS at 11:09

## 2023-09-16 NOTE — ASSESSMENT & PLAN NOTE
Pt with AMS, slightly improving  Blood sugar on arrival 1130 with a bicarb 26, anion gap 18, BHB 1.1.  HbA1c pending  Likely induced by medication noncompliance, abd infection/possible perforated diverticula  Home DM regimen:  metformin 500qd    Plan:  - DKA/HHS Pathway initiated  - Start insulin gtt per protocol with q1h accuchecks while on the drip  - Once Bicarb >18, Anion gap <12, Glucose <200 on 2 readings and able to tolerate PO intake without nausea and vomiting, transition to subq insulin with a 1-2 hr overlap with drip  · Long acting insulin dose:  Detemir (0.25mg/kg) daily or in 2 doses  · Short acting insulin dose:  Aspart (0.08mg/kg) units per meal  - Start normal saline at 125cc/hr.  Once blood sugar is 200, change IVF to D5 1/2 NS at 50cc/hr  - Monitor electrolytes with BMP q4h while on insulin gtt and place on telemetry  - Bariatric clear liquid diet while on insulin gtt then change to diabetic diet when initiating subq insulin with accuchecks 4x daily before meals and at bedtime (AC and HS)  - Determir 11 BID started, overlap 2 hours with insulin drip before discontinuing

## 2023-09-16 NOTE — ASSESSMENT & PLAN NOTE
Blood sugar on arrival 1130 with a bicarb 26, anion gap 18, BHB 1.1.  HbA1c pending  Likely induced by medication noncompliance, abd infection/possible perforated diverticula  Home DM regimen:  metformin 500qd    Plan:  - DKA/HHS Pathway initiated  - Start insulin gtt per protocol with q1h accuchecks while on the drip  - Once Bicarb >18, Anion gap <12, Glucose <200 on 2 readings and able to tolerate PO intake without nausea and vomiting, transition to subq insulin with a 1-2 hr overlap with drip  · Long acting insulin dose:  Detemir (0.25mg/kg) daily or in 2 doses  · Short acting insulin dose:  Aspart (0.08mg/kg) units per meal  - Start normal saline at 125cc/hr.  Once blood sugar is 200, change IVF to D5 1/2 NS at 50cc/hr  - Monitor electrolytes with BMP q4h while on insulin gtt and place on telemetry  - Bariatric clear liquid diet while on insulin gtt then change to diabetic diet when initiating subq insulin with accuchecks 4x daily before meals and at bedtime (AC and HS)  - Endocrine consult, appreciate recs

## 2023-09-16 NOTE — MEDICAL/APP STUDENT
HPI: 57F with PMHx of HTN, HLD, GERD, COPD presented to the ED with elevated BG and AMS. Pt currently prescribed metformin, but has not recently taken it. On admission to the ED pt denied having HA or CP Any further history is limited by pt mental status.    ED course: afebrile, HDS. Initial labs K 4.7, Glucose 1130, bicarb 26, AG 18,  BHB 1.1, Ph 7.36, Cr 2.1, Lactate 2.7, Ca 11, AST 66, , Lipase 77. , troponin 0.085. EKG NSR @ 64 bpm with LVH associated changes. Patient received 1L NS bolus, aspirin 325 mg, KCL oral and IV, empiric vancomycin and zosyn. She was started on insulin bolus and infusion. Repeat glucose improved to 580., however, K dropped to 2.9 and insulin gtt was held while K was replaced. Pt continued on insulin gtt at 0.1u/kg/hr and now at 460. Bicarb 26 on arrival, downtrended to 14. Lactate cleared. Additionally, CTAP positive for suspected localized free intraperitoneal air adjacent to the distal descending colon. Evaluated by CRS, no emergent surgical intervention, started on cipro/flagyl.     Hospital Course:    Interval History: Overnight patient became agitated and was cursing and kicking at staff as well as trying to pull at IV lines per nursing notes. Four point restraints ordered. This morning on exam patient is much more responsive and calm. Spoke with upper level resident and order for restraints was discontinued. Patient reports that she does not remember much about the last few days but states that she wasn't feeling well recently and therefore had not been taking all her medications. She does remember having some dark stools and constipation prior to presentation but is not able to identify any other specific symptoms leading up to presentation at this time. She reports a mild headache today.     ROS: Denies abdominal pain, chest pain .    Objective:  General: alert and oriented, in no acute distress, but required questions to be repeated due to falling back  asleep  HEENT: normocephalic, atraumatic, extraocular muscles grossly intact  Lungs: CTA bilaterally   Cardiovascular: RRR, S1 and S2 present, no murmurs  Abdomen: soft, non distended, not tender to palpation, no masses  Extremities: no lower extremity edema, 2+ posterior tibial pulses    Assessment and Plan  DKA  Patient presented with AMS and initial BG of >500. Started on insulin drip. BG now in mid 200s. Patient is now able to respond to questions but is still mildly confused and somnolent.     Plan  -transition from insulin drip to basal bolus   -stop insulin drip 2 hours after initial basal dose given  -detemir 12 BID  -aspart 6 with meals  -start diabetic diet  -low dose sliding scale insulin  -continue fluids at       Diverticulitis  Patient presented with AMS. CT Abd showed suspected localized free intraperitoneal air adjacent to the distal descending colon, possible perforated diverticula. Patient denies abdominal pain but does report some constipation prior to presentation with dark stools. Physical exam of abdomen benign.     Plan  -complete 4 day course of Cipro/Flagyl  -outpatient colonoscopy    Hypertension  Elevated BP since presentation. Patient initially unable to take oral medications due to AMS. IV hydralazine administered with insufficient effect. Today patient is able to take oral medications.    Plan  -resume home losartan/HCTZ 100mg/25mg  -resume home metoprolol succinate 100mg  -hydralazine 50mg TID PRN for systolic BP >180    Hypothyroidism  -resume home levothyroxine 50mcg    Headache  -tylenol PRN     GERD  -continue famotidine

## 2023-09-16 NOTE — PROVIDER PROGRESS NOTES - EMERGENCY DEPT.
ED [facility] of Care Note  11:12 PM 9/15/2023  Olga Lopez is a 57 y.o. female who presented to the ED on 9/15/2023 and has been managed by , who reports patient C/O hyperglycemia. I assumed care of patient from off-going ED physician team at 11:12 PM pending labs, re-eval.    On my evaluation, Olga Lopez appears ill and is hemodynamically stable. Thus far, Olga Lopez has received:  Medications   sodium chloride 0.9% flush 10 mL (has no administration in time range)   dextrose 5 % and 0.45 % NaCl infusion (has no administration in time range)   dextrose 10% bolus 250 mL 250 mL (has no administration in time range)   dextrose 10% bolus 125 mL 125 mL (has no administration in time range)   insulin regular in 0.9 % NaCl 100 unit/100 mL (1 unit/mL) infusion (0 Units/kg/hr × 90.7 kg Intravenous Paused 9/15/23 2256)   aspirin tablet 325 mg (0 mg Oral Hold 9/15/23 2115)   labetaloL tablet 100 mg (100 mg Oral Given 9/15/23 2140)   potassium chloride 10 mEq in 100 mL IVPB (has no administration in time range)   potassium bicarbonate disintegrating tablet 40 mEq (has no administration in time range)   sodium chloride 0.9% bolus 1,000 mL 1,000 mL (0 mLs Intravenous Stopped 9/15/23 2032)   vancomycin 1,250 mg in dextrose 5 % (D5W) 250 mL IVPB (Vial-Mate) (0 mg Intravenous Stopped 9/15/23 2305)   ceFEPIme (MAXIPIME) 1 g in dextrose 5 % in water (D5W) 100 mL IVPB (MB+) (0 g Intravenous Stopped 9/15/23 2141)   0.9 % NaCl with KCl 20 mEq infusion ( Intravenous New Bag 9/15/23 2116)       On my exam, I appreciate:  BP (!) 179/81   Pulse 73   Temp 99 °F (37.2 °C) (Oral)   Resp 20   Wt 90.7 kg (200 lb)   SpO2 97%   BMI 37.79 kg/m²     ED Course as of 09/15/23 2312   Fri Sep 15, 2023   1925 POC PH: 7.364 [AB]   2003 WBC: 10.61  No leukocytosis  [AB]   2004 CXR on my independent interpretation w/o acute infiltrate [AB]   2019 Potassium: 4.7  Normal  [AB]   2020 Creatinine(!): 2.1  ROBERTO,  most recent 0.7 [AB]   2024 Troponin I(!): 0.085  Abnormal, could be secondary to ROBERTO, but will give aspirin and trend.  [AB]   2025 Anion Gap(!): 18 [AB]   2025 LFTs are abnormal  [AB]   2046 Called the lab - CMP result did not include glucose level, but POC was > 500 and she has an AG elevation.  Ordered for insulin gtt and K-containing IVF.  [AB]      ED Course User Index  [AB] Du Palumbo MD        Additional ED course:  During ED stay, patient was found to have free air in the abdomen concerning for perforated diverticulitis.  Discussed with colorectal surgery.  Patient encephalopathy has improved slightly, she did require some chemical restraint for CT.  Patient became transiently hypokalemic, insulin drip was pause in additional potassium was given.  Patient admitted to hospital medicine    Disposition:  Admit  I have discussed and counseled Olga Lopez regarding exam, results, diagnosis, treatment, and plan.  ______________________  Ajay Michel MD   Emergency Medicine Physician  9/15/2023

## 2023-09-16 NOTE — PLAN OF CARE
Problem: Fall Injury Risk  Goal: Absence of Fall and Fall-Related Injury  Outcome: Ongoing, Not Progressing     Problem: Adult Inpatient Plan of Care  Goal: Plan of Care Review  Outcome: Ongoing, Not Progressing  Goal: Patient-Specific Goal (Individualized)  Outcome: Ongoing, Not Progressing  Goal: Absence of Hospital-Acquired Illness or Injury  Outcome: Ongoing, Not Progressing  Goal: Optimal Comfort and Wellbeing  Outcome: Ongoing, Not Progressing  Goal: Readiness for Transition of Care  Outcome: Ongoing, Not Progressing     Problem: Diabetic Ketoacidosis  Goal: Fluid and Electrolyte Balance with Absence of Ketosis  Outcome: Ongoing, Not Progressing     Problem: Diabetes Comorbidity  Goal: Blood Glucose Level Within Targeted Range  Outcome: Ongoing, Not Progressing     Problem: Fluid and Electrolyte Imbalance (Acute Kidney Injury/Impairment)  Goal: Fluid and Electrolyte Balance  Outcome: Ongoing, Not Progressing     Problem: Oral Intake Inadequate (Acute Kidney Injury/Impairment)  Goal: Optimal Nutrition Intake  Outcome: Ongoing, Not Progressing     Problem: Renal Function Impairment (Acute Kidney Injury/Impairment)  Goal: Effective Renal Function  Outcome: Ongoing, Not Progressing     Problem: Skin Injury Risk Increased  Goal: Skin Health and Integrity  Outcome: Ongoing, Not Progressing    Pt very lethargic throughout shift. Responds to voice. Q2 restraints checks maintained. Continuous NS infusing at 125mL/hr. Insulin drip infusing at 0.01u/kg/hr. Q1 BG monitored for changes. Cardiac monitoring in place. Bed locked in lowest position. Call light within reach. Bed alarm set. Monitoring ongoing.

## 2023-09-16 NOTE — ED NOTES
I-STAT Chem-8+ Results:   Value Reference Range   Sodium 144 136-145 mmol/L   Potassium  3.8 3.5-5.1 mmol/L   Chloride 113  mmol/L   Ionized Calcium 0.85 1.06-1.42 mmol/L   CO2 (measured) 20 23-29 mmol/L   Glucose 543  mg/dL   BUN 23 6-30 mg/dL   Creatinine 0.6 0.5-1.4 mg/dL   Hematocrit 30 36-54%

## 2023-09-16 NOTE — SUBJECTIVE & OBJECTIVE
Past Medical History:   Diagnosis Date    COPD (chronic obstructive pulmonary disease)     GERD (gastroesophageal reflux disease)     Hypertension     Seasonal allergies        Past Surgical History:   Procedure Laterality Date    bladder lift  1994    BREAST SURGERY      EXCISION OF LESION OF THYROID      EYE SURGERY Right 1968    HYSTERECTOMY      OPEN REDUCTION AND INTERNAL FIXATION (ORIF) OF FRACTURE OF OLECRANON PROCESS OF ULNA Right 9/30/2020    Procedure: ORIF, FRACTURE, OLECRANON;  Surgeon: Luis Davidson MD;  Location: [facility];  Service: Orthopedics;  Laterality: Right;  too instruments, k-wire and cable     C-ARM    R Breast Mass Removal Right 02/27/2020    benign    REDUCTION OF BOTH BREASTS      REMOVAL OF HARDWARE FROM UPPER EXTREMITY  05/10/2021    right arm. removal orthopedic hardware     TUBAL LIGATION         Review of patient's allergies indicates:   Allergen Reactions    Vibramycin [doxycycline calcium] Hives    Codeine     Pcn [penicillins]     Sulfa (sulfonamide antibiotics)     Tetracyclines        Current Facility-Administered Medications on File Prior to Encounter   Medication    0.9%  NaCl infusion    lactated ringers infusion     Current Outpatient Medications on File Prior to Encounter   Medication Sig    acetaminophen (TYLENOL) 500 MG tablet Take 500 mg by mouth 2 (two) times daily.    amLODIPine (NORVASC) 2.5 MG tablet Take 2 tablets (5 mg total) by mouth once daily. Patient reports she was taking 5 daily not twice a day    aspirin (ECOTRIN) 81 MG EC tablet Take 81 mg by mouth once daily.    atorvastatin (LIPITOR) 40 MG tablet Take 40 mg by mouth once daily.    blood sugar diagnostic Strp One test strip use 1 times a day to check blood glucose,  ICD-10: E11.9, compatible with insurance/glucometer    blood-glucose meter kit One glucometer, use to check 1 times a day.   ICD-10: E11.9,  Dispense machine covered by insurance    CETIRIZINE HCL (ZYRTEC ORAL) Take by mouth.    furosemide  (LASIX) 40 MG tablet Take One tablet by mouth twice a day for 5 days, then take one tablet daily    HYDROcodone-acetaminophen (NORCO) 5-325 mg per tablet Take 1 tablet by mouth every 4 (four) hours as needed for Pain.    labetaloL (NORMODYNE) 100 MG tablet Take 100 mg by mouth 3 (three) times daily with meals.    lancets Misc One lancets use 1 times a day to check blood glucose, ICD-10: E11.9    lancing device Misc One device, used to check blood glucose, ICD-10: E11.9    levothyroxine (SYNTHROID) 50 MCG tablet Take 1 tablet (50 mcg total) by mouth before breakfast.    metFORMIN (GLUCOPHAGE-XR) 500 MG ER 24hr tablet Take 2 tablets with breakfast and 1 tablet with supper.    omega-3 fatty acids/fish oil (FISH OIL-OMEGA-3 FATTY ACIDS) 300-1,000 mg capsule Take by mouth once daily.    omeprazole (PRILOSEC) 20 MG capsule Take 20 mg by mouth once daily.    ondansetron (ZOFRAN) 4 MG tablet Take 1 tablet (4 mg total) by mouth every 6 (six) hours.    valsartan (DIOVAN) 320 MG tablet Take 1 tablet (320 mg total) by mouth once daily.     Family History    None       Tobacco Use    Smoking status: Never    Smokeless tobacco: Never   Substance and Sexual Activity    Alcohol use: No    Drug use: Never    Sexual activity: Not on file     Review of Systems   Unable to perform ROS: Mental status change     Objective:     Vital Signs (Most Recent):  Temp: 97.6 °F (36.4 °C) (09/16/23 0545)  Pulse: (!) 58 (09/16/23 0545)  Resp: (!) 22 (09/16/23 0545)  BP: 118/62 (09/16/23 0545)  SpO2: (!) 92 % (09/16/23 0545) Vital Signs (24h Range):  Temp:  [96.8 °F (36 °C)-99 °F (37.2 °C)] 97.6 °F (36.4 °C)  Pulse:  [58-81] 58  Resp:  [16-39] 22  SpO2:  [92 %-100 %] 92 %  BP: (118-201)/() 118/62     Weight: 91.4 kg (201 lb 8 oz)  Body mass index is 38.07 kg/m².     Physical Exam  Vitals reviewed.   Constitutional:       General: She is not in acute distress.     Appearance: She is obese. She is ill-appearing. She is not diaphoretic.   HENT:       Head: Atraumatic.      Mouth/Throat:      Mouth: Mucous membranes are dry.   Eyes:      Pupils: Pupils are equal, round, and reactive to light.   Cardiovascular:      Rate and Rhythm: Normal rate.      Heart sounds: No murmur heard.  Pulmonary:      Effort: Pulmonary effort is normal. No respiratory distress.      Breath sounds: Normal breath sounds. No rales.   Abdominal:      General: There is no distension.      Palpations: Abdomen is soft.      Tenderness: There is no abdominal tenderness.   Musculoskeletal:         General: No swelling or tenderness.      Cervical back: No tenderness.      Right lower leg: No edema.      Left lower leg: No edema.   Skin:     General: Skin is warm and dry.      Coloration: Skin is not jaundiced.   Neurological:      Comments: Somnolent, likely 2/2 ativan. Oriented to self and place.              CRANIAL NERVES     CN III, IV, VI   Pupils are equal, round, and reactive to light.       Significant Labs: All pertinent labs within the past 24 hours have been reviewed.  ABGs:   Recent Labs   Lab 09/15/23  1910   PH 7.364   PCO2 55.2*   HCO3 31.5*   POCSATURATED 63*   BE 6   PO2 35*     CBC:   Recent Labs   Lab 09/15/23  1902 09/15/23  2252 09/16/23  0053 09/16/23  0325 09/16/23  0332   WBC 10.61  --   --  10.16  --    HGB 15.7  --   --  11.2*  --    HCT 50.2*   < > 30* 37.2 35*     --   --  145*  --     < > = values in this interval not displayed.     CMP:   Recent Labs   Lab 09/15/23  1902 09/15/23  2338 09/16/23  0325   * 140 143  143   K 4.7 2.9* 4.2  4.2   CL 91* 113* 113*  113*   CO2 26 19* 14*  14*   GLU 1,130* 575* 530*  530*   BUN 31* 21* 21*  21*   CREATININE 2.1* 0.8 1.0  1.0   CALCIUM 10.9* 6.4* 7.1*  7.1*   PROT 8.7* 4.8* 5.5*   ALBUMIN 3.4* 1.9* 2.1*   BILITOT 0.4 0.2 0.2   ALKPHOS 308* 159* 198*   AST 66* 40 53*   * 65* 71*   ANIONGAP 18* 8 16  16     Cardiac Markers:   Recent Labs   Lab 09/15/23  1902   *     Lactic Acid:   Recent  "Labs   Lab 09/16/23  0325   LACTATE 1.5     POCT Glucose:   Recent Labs   Lab 09/16/23  0030 09/16/23  0318 09/16/23  0550   POCTGLUCOSE >500* >500* 461*     Troponin:   Recent Labs   Lab 09/15/23  1902 09/15/23  2155   TROPONINI 0.085* 0.081*     TSH: No results for input(s): "TSH" in the last 4320 hours.  Urine Studies:   Recent Labs   Lab 09/15/23  2033   COLORU Straw   APPEARANCEUA Hazy*   PHUR 6.0   SPECGRAV 1.030   PROTEINUA 1+*   GLUCUA 3+*   KETONESU Trace*   BILIRUBINUA Negative   OCCULTUA 1+*   NITRITE Negative   LEUKOCYTESUR Negative   RBCUA 4   WBCUA 3   BACTERIA Rare   SQUAMEPITHEL 4   HYALINECASTS 0       Significant Imaging: I have reviewed all pertinent imaging results/findings within the past 24 hours.  "

## 2023-09-16 NOTE — SUBJECTIVE & OBJECTIVE
Interval History:   Patient was still lethargic but arousable when admitted to the floor. AMS likely 2/2 to her significantly elevated glucose levels. She was started on IV Cipro/Flagyl for perforated diverticulitis seen on imaging. She has been by colorectal and surgery is not indicated at the moment. She was started on an insulin drip and continuous IVF for her DKA. BMP q4 with glucose checks q1 ordered. Gap closed. 11 units BID Determir was started and waiting 2 hours to DC insulin drip.     Review of Systems  Objective:     Vital Signs (Most Recent):  Temp: 98.5 °F (36.9 °C) (09/16/23 1148)  Pulse: 64 (09/16/23 1148)  Resp: 17 (09/16/23 1148)  BP: (!) 188/84 (09/16/23 1148)  SpO2: 97 % (09/16/23 1148) Vital Signs (24h Range):  Temp:  [96.8 °F (36 °C)-99 °F (37.2 °C)] 98.5 °F (36.9 °C)  Pulse:  [58-81] 64  Resp:  [16-39] 17  SpO2:  [92 %-100 %] 97 %  BP: (118-201)/() 188/84     Weight: 91.4 kg (201 lb 8 oz)  Body mass index is 38.07 kg/m².    Intake/Output Summary (Last 24 hours) at 9/16/2023 1422  Last data filed at 9/16/2023 0842  Gross per 24 hour   Intake 2781.25 ml   Output 2350 ml   Net 431.25 ml         Physical Exam        Significant Labs: All pertinent labs within the past 24 hours have been reviewed.    Significant Imaging: I have reviewed all pertinent imaging results/findings within the past 24 hours.

## 2023-09-16 NOTE — CONSULTS
Food & Nutrition  Education    Diet Education: DM education  Time Spent: 1 minute  Learners: Patient and Daughter      Nutrition Education provided with handouts: Meal Planning Using the Plate Method, CHO Counting for People with Diabetes      Comments: RD consulted for DM education. RD attempted to speak with patient 2x. RD provided nutrition education handout to daughter at bedside and will f/u with pt on Monday (9/18) to provide education if pt remains admitted. RD recommends outpatient diabetic management clinic referral for further education. Recommendations provided below.     Recommendations    ADAT 1600 Calorie Diabetic Diet, texture per SLP.   If EN warranted recommend Diabetisource AC @ 55 mL/hr to provide 1584 kcal, 79 g PRO and 1080 mL fluid   Estimated needs: 1566 kcal/d  (MSJ x 1.1), PRO 73 g PRO  3. RD to monitor and follow-up.    Goals: Meet % EEN/EPN by follow-up date.  Nutrition Goal Status: new  Communication of RD Recs: other (comment) (POC)      All questions and concerns answered. Dietitian's contact information provided.       Follow-Up: Yes    Please Re-consult as needed        Thanks!    Jayesh [place] Registration Eligible, Provisional LDN

## 2023-09-16 NOTE — HPI
57F with PMHx of HTN, HLD, GERD, COPD presented to the ED with elevated BG and AMS. Pt currently prescribed metformin, but has not recently taken it. On admission to the ED pt denied having HA or CP Any further history is limited by pt mental status.    ED course: afebrile, HDS. Initial labs K 4.7, Glucose 1130, bicarb 26, AG 18,  BHB 1.1, Ph 7.36, Cr 2.1, Lactate 2.7, Ca 11, AST 66, , Lipase 77. , troponin 0.085. EKG NSR @ 64 bpm with LVH associated changes. Patient received 1L NS bolus, aspirin 325 mg, KCL oral and IV, empiric vancomycin and zosyn. She was started on insulin bolus and infusion. Repeat glucose improved to 580., however, K dropped to 2.9 and insulin gtt was held while K was replaced. Pt continued on insulin gtt at 0.1u/kg/hr and now at 460. Bicarb 26 on arrival, downtrended to 14. Lactate cleared. Additionally, CTAP positive for suspected localized free intraperitoneal air adjacent to the distal descending colon. Evaluated by CRS, no emergent surgical intervention, started on cipro/flagyl.

## 2023-09-16 NOTE — ED NOTES
Received pt disoriented and in soft restraints and in NAD.  Pt breathing E/U on 3L NC.  Call bell in reach with bed rails up x2.  Pt placed on continuous cardiac, O2, and BP monitoring.  Pt advised of plan of care to include all test and procedures. Patient stable at this time; will continue with plan of care.

## 2023-09-16 NOTE — ED NOTES
Telemetry Verification   Patient placed on Telemetry Box  Verified with War Room  Tech    Box # [id number]   Rate 58   Rhythm Sinus pallavi

## 2023-09-16 NOTE — ASSESSMENT & PLAN NOTE
- pCO2 elevated to 55 on arrival with hx of COPD. No wheezing on exam, breath sounds nl, CXR without evidence of hyperinflation/air trapping    Plan  - duonebs prn

## 2023-09-16 NOTE — ASSESSMENT & PLAN NOTE
Pt has hx diastolic heart failure,  on admission, CXR with evidence of possible early congestion. Exam without evidence of volume overload, however pt is requiring 3L NC to maintain O2 sats >95%.     Plan  - TTE pending  - Pt on home valsartan and labetalol, unsure of compliance  - Lasix or spironolactone prn

## 2023-09-16 NOTE — PROGRESS NOTES
[facility] - Telemetry Stepdown  Colorectal Surgery  Progress Note    Patient Name: lOga Lopez  MRN: [id number]  Admission Date: 9/15/2023  Hospital Length of Stay: 0 days  Attending Physician: Courtney Canas*    Subjective:     Interval History: POCT glucose this . Patient with nontender abdomen this AM, more responsive. CRP 4.5    Post-Op Info:  * No surgery found *          Medications:  Continuous Infusions:   sodium chloride 0.9% 125 mL/hr (09/16/23 0318)    dextrose 5 % and 0.45 % NaCl      dextrose 5 % and 0.45 % NaCl      insulin regular 1 units/mL infusion orderable (DKA) 0.01 Units/kg/hr (09/16/23 0705)     Scheduled Meds:   aspirin  325 mg Oral ED 1 Time    ciprofloxacin  400 mg Intravenous Q8H    famotidine (PF)  20 mg Intravenous Q12H    heparin (porcine)  5,000 Units Subcutaneous Q8H    labetaloL  100 mg Oral TID WM    metronidazole  500 mg Intravenous Q8H    potassium chloride  10 mEq Intravenous Q1H    sodium chloride 0.9%  1,000 mL Intravenous Once     PRN Meds:   acetaminophen    dextrose 10%    dextrose 10%    dextrose 10%    dextrose 10%    dextrose 5 % and 0.45 % NaCl    dextrose 5 % and 0.45 % NaCl    melatonin    ondansetron    polyethylene glycol    prochlorperazine    sodium chloride 0.9%    sodium chloride 0.9%        Objective:     Vital Signs (Most Recent):  Temp: 98.8 °F (37.1 °C) (09/16/23 0753)  Pulse: (!) 59 (09/16/23 0753)  Resp: 18 (09/16/23 0753)  BP: (!) 193/83 (09/16/23 0753)  SpO2: 99 % (09/16/23 0753) Vital Signs (24h Range):  Temp:  [96.8 °F (36 °C)-99 °F (37.2 °C)] 98.8 °F (37.1 °C)  Pulse:  [58-81] 59  Resp:  [16-39] 18  SpO2:  [92 %-100 %] 99 %  BP: (118-201)/() 193/83     Intake/Output - Last 3 Shifts         09/14 0700  09/15 0659 09/15 0700  09/16 0659 09/16 0700  09/17 0659    I.V. (mL/kg)  431.3 (4.7)     IV Piggyback  2350     Total Intake(mL/kg)  2781.3 (30.4)     Urine (mL/kg/hr)  1550 400 (3.1)    Total Output   1550 400    Net  +1231.3 -400                    Physical Exam  Constitutional:       Appearance: She is ill-appearing.      Comments: Lethargic yet responsive to touch and voice   Cardiovascular:      Rate and Rhythm: Normal rate.   Pulmonary:      Effort: Pulmonary effort is normal. No respiratory distress.   Abdominal:      General: There is no distension.      Palpations: Abdomen is soft.   Skin:     General: Skin is warm and dry.            Significant Labs:  BMP:   Recent Labs   Lab 09/16/23  0325   *  530*     143   K 4.2  4.2   *  113*   CO2 14*  14*   BUN 21*  21*   CREATININE 1.0  1.0   CALCIUM 7.1*  7.1*   MG 1.8     CBC:   Recent Labs   Lab 09/16/23 0325 09/16/23  0332   WBC 10.16  --    RBC 4.33  --    HGB 11.2*  --    HCT 37.2 35*   *  --    MCV 86  --    MCH 25.9*  --    MCHC 30.1*  --      CRP:   Recent Labs   Lab 09/16/23  0325   CRP 4.5       Significant Diagnostics:  I have reviewed all pertinent imaging results/findings within the past 24 hours.    Assessment/Plan:   Olga Lopez is a 57 y.o. female with a PMHx of HTN, HLD, T2DM, GERD, and COPD. She presents to the [facility] ED 9/16 with altered mental status and shortness of breath. ED workup significant for hyperglycemia with DKA.    Colorectal Surgery consulted for management of suspected bowel perforation based on CT scan findings.  - No leukocytosis, abdominal exam is benign at this time, CRP 4.5  - Follow up AM labs  - Serial abdominal exams  - IV antibiotics, PCN allergy so will start Cipro/Flagyl  - Keep NPO at this time  - Will continue to follow    Addis Lee MD  Colorectal Surgery  [facility] - Telemetry Stepdown

## 2023-09-16 NOTE — ASSESSMENT & PLAN NOTE
Creatinine 2.1 on admit, baseline around 0.8  - Likely pre-renal from dehydration and volume losses  - improved with fluid resuscitation  Plan:     - Check urine lytes  - Check urine protein creatinine ratio  - Strict I&Os and daily weights   - Avoid nephrotoxic agents such as NSAIDs, gadolinium and IV radiocontrast  - Renally dose meds to current GFR  - Maintain MAP > 65

## 2023-09-16 NOTE — NURSING
Nurses Note -- 4 Eyes      9/16/2023   8:48 AM      Skin assessed during: Q Shift Change      [x] No Altered Skin Integrity Present    []Prevention Measures Documented      [] Yes- Altered Skin Integrity Present or Discovered   [] LDA Added if Not in Epic (Describe Wound)   [] New Altered Skin Integrity was Present on Admit and Documented in LDA   [] Wound Image Taken    Wound Care Consulted? No    Attending Nurse:  Sharee Swan RN    Second RN/Staff Member:   Charmaine Zapata RN

## 2023-09-16 NOTE — CONSULTS
"Colon and Rectal Surgery Consultation     Patient Name:        Olga Lopez  MRN:          [id number]  YOB: 1965  (57 y.o.)  Encounter Date:        9/16/2023  Primary Care Physician: Jonh Berg MD    CC: complicated diverticulitis    HPI:  Olga Lopez is a 57 y.o. female with a PMHx of HTN, HLD, T2DM, GERD, and COPD. She presents to the [facility] ED with altered mental status and shortness of breath.     History unable to be obtained at this time due to patient mental status. No documented colonoscopy on file. ED workup significant for hyperglycemia with DKA.     CT scan with "an area of air density adjacent to the descending colon concerning for extraluminal air, the possibly that this represents air within a prominent distended diverticulum is a consideration although thought less likely. This may relate to a focal localized perforation, minimal haziness at this level is noted, could represent sequelae of diverticulitis without a large degree of inflammatory change"      PAST MEDICAL HISTORY:  Past Medical History:   Diagnosis Date    COPD (chronic obstructive pulmonary disease)     GERD (gastroesophageal reflux disease)     Hypertension     Seasonal allergies        PAST SURGICAL HISTORY:  Past Surgical History:   Procedure Laterality Date    bladder lift  1994    BREAST SURGERY      EXCISION OF LESION OF THYROID      EYE SURGERY Right 1968    HYSTERECTOMY      OPEN REDUCTION AND INTERNAL FIXATION (ORIF) OF FRACTURE OF OLECRANON PROCESS OF ULNA Right 9/30/2020    Procedure: ORIF, FRACTURE, OLECRANON;  Surgeon: Luis Davidson MD;  Location: [facility];  Service: Orthopedics;  Laterality: Right;  too instruments, k-wire and cable     C-ARM    R Breast Mass Removal Right 02/27/2020    benign    REDUCTION OF BOTH BREASTS      REMOVAL OF HARDWARE FROM UPPER EXTREMITY  05/10/2021    right arm. removal orthopedic hardware     TUBAL LIGATION         SOCIAL HISTORY:  Social " History     Tobacco Use    Smoking status: Never    Smokeless tobacco: Never   Substance Use Topics    Alcohol use: No    Drug use: Never       FAMILY HISTORY:  History reviewed. No pertinent family history.    MEDICATIONS:  Home Meds:   Prior to Admission medications    Medication Sig Start Date End Date Taking? Authorizing Provider   acetaminophen (TYLENOL) 500 MG tablet Take 500 mg by mouth 2 (two) times daily.    Provider, Historical   amLODIPine (NORVASC) 2.5 MG tablet Take 2 tablets (5 mg total) by mouth once daily. Patient reports she was taking 5 daily not twice a day 10/7/22   Hans Cervantes MD   aspirin (ECOTRIN) 81 MG EC tablet Take 81 mg by mouth once daily.    Provider, Historical   atorvastatin (LIPITOR) 40 MG tablet Take 40 mg by mouth once daily.    Provider, Historical   blood sugar diagnostic Strp One test strip use 1 times a day to check blood glucose,  ICD-10: E11.9, compatible with insurance/glucometer 4/14/22   Magno Wiggins MD   blood-glucose meter kit One glucometer, use to check 1 times a day.   ICD-10: E11.9,  Dispense machine covered by insurance 4/14/22   Magno Wiggins MD   CETIRIZINE HCL (ZYRTEC ORAL) Take by mouth.    Provider, Historical   furosemide (LASIX) 40 MG tablet Take One tablet by mouth twice a day for 5 days, then take one tablet daily 10/7/22   Hans Cervantes MD   HYDROcodone-acetaminophen (NORCO) 5-325 mg per tablet Take 1 tablet by mouth every 4 (four) hours as needed for Pain. 6/30/22   Clem Gaspar,    labetaloL (NORMODYNE) 100 MG tablet Take 100 mg by mouth 3 (three) times daily with meals.    Provider, Historical   lancets Misc One lancets use 1 times a day to check blood glucose, ICD-10: E11.9 4/14/22   Magno Wiggins MD   lancing device Misc One device, used to check blood glucose, ICD-10: E11.9 4/14/22   Magno Wiggins MD   levothyroxine (SYNTHROID) 50 MCG tablet Take 1 tablet (50 mcg total) by mouth before breakfast. 11/11/22   Magno Wiggins,  MD   metFORMIN (GLUCOPHAGE-XR) 500 MG ER 24hr tablet Take 2 tablets with breakfast and 1 tablet with supper. 11/11/22   Angela Duran, NP   omega-3 fatty acids/fish oil (FISH OIL-OMEGA-3 FATTY ACIDS) 300-1,000 mg capsule Take by mouth once daily.    Provider, Historical   omeprazole (PRILOSEC) 20 MG capsule Take 20 mg by mouth once daily.    Provider, Historical   ondansetron (ZOFRAN) 4 MG tablet Take 1 tablet (4 mg total) by mouth every 6 (six) hours. 6/30/22   Clem Gaspar,    valsartan (DIOVAN) 320 MG tablet Take 1 tablet (320 mg total) by mouth once daily. 10/7/22 1/5/23  Hans Cervantes MD     Scheduled Meds:   aspirin  325 mg Oral ED 1 Time    famotidine (PF)  20 mg Intravenous Q12H    heparin (porcine)  5,000 Units Subcutaneous Q8H    labetaloL  100 mg Oral TID WM     Continuous Infusions:   sodium chloride 0.9% 125 mL/hr (09/16/23 0318)    dextrose 5 % and 0.45 % NaCl      dextrose 5 % and 0.45 % NaCl      insulin regular 1 units/mL infusion orderable (DKA) 0.1 Units/kg/hr (09/16/23 0126)     PRN Meds:.acetaminophen, dextrose 10%, dextrose 10%, dextrose 10%, dextrose 10%, dextrose 5 % and 0.45 % NaCl, dextrose 5 % and 0.45 % NaCl, melatonin, ondansetron, polyethylene glycol, prochlorperazine, sodium chloride 0.9%, sodium chloride 0.9%    ALLERGIES:  Vibramycin [doxycycline calcium], Codeine, Pcn [penicillins], Sulfa (sulfonamide antibiotics), and Tetracyclines    REVIEW OF SYSTEMS:  Unable to obtain due to AMS    PHYSICAL EXAM:  PHYSICAL EXAMINATION:  Altered mental status: unable to respond to questions at time of exam. Moves all extremities, eyes open, does track and respond to voice and touch.  Blood pressure (!) 162/70, pulse 60, temperature 99 °F (37.2 °C), temperature source Oral, resp. rate 20, weight 90.7 kg (200 lb), SpO2 95 %.  Body mass index is 37.79 kg/m².  HEENT: Sclerae anicteric, trachea midline  Pulm: No increased WOB. On NC O2  Abd:   Soft, non-tender, non-distended. Does not  "respond with pain / no guarding when examined.   CHUNG: Deferred  Neuro: See above    LABORATORY RESULTS:  Complete Blood Count:  Lab Results   Component Value Date/Time    WBC 10.61 09/15/2023 07:02 PM    HGB 15.7 09/15/2023 07:02 PM    HCT 35 (L) 09/16/2023 03:32 AM    RBC 6.06 (H) 09/15/2023 07:02 PM     09/15/2023 07:02 PM       Basic Chemistry Panel:  Lab Results   Component Value Date/Time     09/15/2023 11:38 PM    K 2.9 (L) 09/15/2023 11:38 PM     (H) 09/15/2023 11:38 PM    CO2 19 (L) 09/15/2023 11:38 PM    BUN 21 (H) 09/15/2023 11:38 PM    CREATININE 0.8 09/15/2023 11:38 PM    GLUCOSE 105 (H) 10/05/2020 03:23 AM    CALCIUM 6.4 (LL) 09/15/2023 11:38 PM       Hepatic Panel:  Lab Results   Component Value Date/Time    ALKPHOS 159 (H) 09/15/2023 11:38 PM    ALBUMIN 1.9 (L) 09/15/2023 11:38 PM       Nutrition Labs:  Lab Results   Component Value Date/Time    ALBUMIN 1.9 (L) 09/15/2023 11:38 PM    PREALBUMIN 31 06/30/2022 09:09 PM    TRANSFERRIN 311 06/30/2022 09:09 PM       Coagulation Panel:  Lab Results   Component Value Date/Time    INR 1.0 08/09/2022 02:18 PM       IMPRESSION:  Olga CoffmanHammadNacho is a 57 y.o. female with a PMHx of HTN, HLD, T2DM, GERD, and COPD. [facility]16 with altered mental status and shortness of breath. ED workup significant for hyperglycemia with DKA.     CT scan with "an area of air density adjacent to the descending colon concerning for extraluminal air, the possibly that this represents air within a prominent distended diverticulum is a consideration although thought less likely. This may relate to a focal localized perforation, minimal haziness at this level is noted, could represent sequelae of diverticulitis without a large degree of inflammatory change"    No leukocytosis, abdominal exam is benign at this time, CRP pending.    PLAN:  - Admission per hospital medicine team for primary medical problems  - No surgical intervention at this " time  - Serial abdominal exams  - Obtain CRP  - IV antibiotics, PCN allergy so will start Cipro/Flagyl  - Keep NPO at this time  - Will continue to follow    Chato Levine MD  Colon and Rectal Surgery

## 2023-09-16 NOTE — CARE UPDATE
RAPID RESPONSE NURSE PROACTIVE ROUNDING NOTE       Time of Visit: 0840    Admit Date: 9/15/2023  LOS: 0  Code Status: Full Code   Date of Visit: 2023  : 1965  Age: 57 y.o.  Sex: female  Race: Black or   Bed: 8071/8071 A:   MRN: [id number]  Was the patient discharged from an ICU this admission? No   Was the patient discharged from a PACU within last 24 hours? No   Did the patient receive conscious sedation/general anesthesia in last 24 hours? No  Was the patient in the ED within the past 24 hours? Yes  Was the patient on NIPPV within the past 24 hours? No   Attending Physician: Courtney Canas*  Primary Service: [facility] HOSP MED 4   Time spent at the bedside: < 15 min    SITUATION    Notified by bedside RN via phone call.  Reason for alert: Altered Mental Sataus  Called to evaluate the patient for Neuro    BACKGROUND     Why is the patient in the hospital?: DKA (diabetic ketoacidosis)    Patient has a past medical history of COPD (chronic obstructive pulmonary disease), GERD (gastroesophageal reflux disease), Hypertension, and Seasonal allergies.    Last Vitals:  Temp: 97.6 °F (36.4 °C) (842)  Pulse: 58 ( 1038)  Resp: 16 (842)  BP: 177/80 (842)  SpO2: 99 % (842)    24 Hours Vitals Range:  Temp:  [96.8 °F (36 °C)-99 °F (37.2 °C)]   Pulse:  [58-81]   Resp:  [16-39]   BP: (118-201)/()   SpO2:  [92 %-100 %]     Labs:  Recent Labs     09/15/23  1902 09/15/23  2252 23  0325 23  0332 23  0853   WBC 10.61  --  10.16  --   --    HGB 15.7  --  11.2*  --   --    HCT 50.2*   < > 37.2 35* 39     --  145*  --   --     < > = values in this interval not displayed.       Recent Labs     09/15/23  1902 09/15/23  2338 235   * 140 143  143   K 4.7 2.9* 4.2  4.2   CL 91* 113* 113*  113*   CO2 26 19* 14*  14*   BUN 31* 21* 21*  21*   CREATININE 2.1* 0.8 1.0  1.0   GLU 1,130* 575* 530*  530*   PHOS  --   --  2.7   MG  --    --  1.8        Recent Labs     09/15/23  1910 09/16/23  0853   PH 7.364 7.325*   PCO2 55.2* 56.4*   PO2 35* 35*   HCO3 31.5* 29.4*   POCSATURATED 63* 62*   BE 6 3        ASSESSMENT    Pt resting in bed with eyes closed, awake to verbal stimuli, answers orientation question approprietly and follows commands. Falls asleep immediatly after . MD at bedside.   Physical Exam  Vitals and nursing note reviewed.   Constitutional:       Appearance: She is ill-appearing.   Eyes:      Pupils: Pupils are equal, round, and reactive to light.   Cardiovascular:      Rate and Rhythm: Normal rate.   Pulmonary:      Effort: Pulmonary effort is normal.   Abdominal:      Palpations: Abdomen is soft.   Skin:     General: Skin is cool.      Capillary Refill: Capillary refill takes 2 to 3 seconds.   Neurological:      Mental Status: She is lethargic.      GCS: GCS eye subscore is 3. GCS verbal subscore is 4. GCS motor subscore is 6.         INTERVENTIONS    The patient was seen for Neurological problem. Staff concerns included decreased responsiveness. The following interventions were performed: POCT glucose, POCT arterial blood gas , seizure precautions, continuous pulse ox monitoring continued , and continuous cardiac monitoring continued.    RECOMMENDATIONS    Cont tele and O2 monitoring  Maintain IV access  Vitals per unit routine  Cont care per primary team   Cont insulin Gtts per nomogram  Delirium precautions   BMP Q4H.    PROVIDER ESCALATION    Yes/No  No    Orders received and case discussed with NA.    Disposition: Remain in room 8073.    FOLLOW-UP    Charge Erin CHILDERS  updated on plan of care. Instructed to call the Rapid Response Nurse, Jeison Juan RN at [phone] for additional questions or concerns.

## 2023-09-16 NOTE — PROGRESS NOTES
[facility]  Progress Note    Patient Name: Olga Lopez  MRN: [id number]  Patient Class: IP- Inpatient   Admission Date: 9/15/2023  Length of Stay: 0 days  Attending Physician: Courtney Cansa*  Primary Care Provider: Jonh Berg MD        Subjective:     Principal Problem:DKA (diabetic ketoacidosis)        HPI:  57F with PMHx of HTN, HLD, GERD, COPD presented to the ED with elevated BG and AMS. Pt currently prescribed metformin, but has not recently taken it. On admission to the ED pt denied having HA or CP Any further history is limited by pt mental status.    ED course: afebrile, HDS. Initial labs K 4.7, Glucose 1130, bicarb 26, AG 18,  BHB 1.1, Ph 7.36, Cr 2.1, Lactate 2.7, Ca 11, AST 66, , Lipase 77. , troponin 0.085. EKG NSR @ 64 bpm with LVH associated changes. Patient received 1L NS bolus, aspirin 325 mg, KCL oral and IV, empiric vancomycin and zosyn. She was started on insulin bolus and infusion. Repeat glucose improved to 580., however, K dropped to 2.9 and insulin gtt was held while K was replaced. Pt continued on insulin gtt at 0.1u/kg/hr and now at 460. Bicarb 26 on arrival, downtrended to 14. Lactate cleared. Additionally, CTAP positive for suspected localized free intraperitoneal air adjacent to the distal descending colon. Evaluated by CRS, no emergent surgical intervention, started on cipro/flagyl.       Overview/Hospital Course:  No notes on file    Interval History:   Patient was still lethargic but arousable when admitted to the floor. AMS likely 2/2 to her significantly elevated glucose levels. She was started on IV Cipro/Flagyl for perforated diverticulitis seen on imaging. She has been by colorectal, surgery is not indicated at the moment. She was started on an insulin drip and continuous IVF for her DKA as she is in a volume depleted state. BMP q4 with glucose checks q1 ordered. Gap closed. 11 units BID Determir was  started and waiting 2 hours to DC insulin drip. Hypernatremic, Na increased from 151 on admit to 153 when corrected for glucose.     Review of Systems  Objective:     Vital Signs (Most Recent):  Temp: 98.5 °F (36.9 °C) (09/16/23 1148)  Pulse: 64 (09/16/23 1148)  Resp: 17 (09/16/23 1148)  BP: (!) 188/84 (09/16/23 1148)  SpO2: 97 % (09/16/23 1148) Vital Signs (24h Range):  Temp:  [96.8 °F (36 °C)-99 °F (37.2 °C)] 98.5 °F (36.9 °C)  Pulse:  [58-81] 64  Resp:  [16-39] 17  SpO2:  [92 %-100 %] 97 %  BP: (118-201)/() 188/84     Weight: 91.4 kg (201 lb 8 oz)  Body mass index is 38.07 kg/m².    Intake/Output Summary (Last 24 hours) at 9/16/2023 1422  Last data filed at 9/16/2023 0842  Gross per 24 hour   Intake 2781.25 ml   Output 2350 ml   Net 431.25 ml         Physical Exam        Significant Labs: All pertinent labs within the past 24 hours have been reviewed.    Significant Imaging: I have reviewed all pertinent imaging results/findings within the past 24 hours.      Assessment/Plan:      * DKA (diabetic ketoacidosis)  Pt with AMS, slightly improving  Blood sugar on arrival 1130 with a bicarb 26, anion gap 18, BHB 1.1.  HbA1c pending  Likely induced by medication noncompliance, abd infection/possible perforated diverticula  Home DM regimen:  metformin 500qd    Plan:  - DKA/HHS Pathway initiated  - Start insulin gtt per protocol with q1h accuchecks while on the drip  - Once Bicarb >18, Anion gap <12, Glucose <200 on 2 readings and able to tolerate PO intake without nausea and vomiting, transition to subq insulin with a 1-2 hr overlap with drip  Long acting insulin dose:  Detemir (0.25mg/kg) daily or in 2 doses  Short acting insulin dose:  Aspart (0.08mg/kg) units per meal  - Start normal saline at 125cc/hr.  Once blood sugar is 200, change IVF to D5 1/2 NS at 50cc/hr  - Monitor electrolytes with BMP q4h while on insulin gtt and place on telemetry  - Bariatric clear liquid diet while on insulin gtt then change to  diabetic diet when initiating subq insulin with accuchecks 4x daily before meals and at bedtime (AC and HS)  - Determir 11 BID started, overlap 2 hours with insulin drip before discontinuing      Diverticulitis  -CT A/P concerning for diverticulitis w/ perforation  - Colorectal consulted, surgery not indicated   - Started on IV Cipro/Flagyl       ROBERTO (acute kidney injury)  Creatinine 2.1 on admit, baseline around 0.8  - Likely pre-renal from dehydration and volume losses  - improved with fluid resuscitation  Plan:     - Check urine lytes  - Check urine protein creatinine ratio  - Strict I&Os and daily weights   - Avoid nephrotoxic agents such as NSAIDs, gadolinium and IV radiocontrast  - Renally dose meds to current GFR  - Maintain MAP > 65      Elevated brain natriuretic peptide (BNP) level  Pt has hx diastolic heart failure,  on admission, CXR with evidence of possible early congestion. Exam without evidence of volume overload, however pt is requiring 3L NC to maintain O2 sats >95%.     Plan  - TTE pending  - Pt on home valsartan and labetalol, unsure of compliance  - Lasix or spironolactone prn      COPD (chronic obstructive pulmonary disease)  - pCO2 elevated to 55 on arrival with hx of COPD. No wheezing on exam, breath sounds nl, CXR without evidence of hyperinflation/air trapping    Plan  - duonebs prn      GERD (gastroesophageal reflux disease)  IV PPI while NPO      Hyperlipidemia  Continue home statin when no longer NPO      HTN (hypertension)  - Home Labetalol TID cont  - Hydralazine 10 q6 PRN      VTE Risk Mitigation (From admission, onward)           Ordered     heparin (porcine) injection 5,000 Units  Every 8 hours (non-standard times)         09/16/23 0312     IP VTE HIGH RISK PATIENT  Once         09/16/23 0312     Place KADI hose  Until discontinued         09/16/23 0312     Place sequential compression device  Until discontinued         09/16/23 0312                    Discharge Planning    CHRISTIANNE: 9/18/2023     Code Status: Full Code   Is the patient medically ready for discharge?: No    Reason for patient still in hospital (select all that apply): Patient trending condition                     Roz Trejo DO  Department of Hospital Medicine   [facility] - Telemetry Stepdown

## 2023-09-16 NOTE — ED NOTES
I-STAT Chem-8+ Results:   Value Reference Range   Sodium 145 136-145 mmol/L   Potassium  4 3.5-5.1 mmol/L   Chloride 111  mmol/L   Ionized Calcium 1.01 1.06-1.42 mmol/L   CO2 (measured) 21 23-29 mmol/L   Glucose 545  mg/dL   BUN 25 6-30 mg/dL   Creatinine 0.7 0.5-1.4 mg/dL   Hematocrit 35 36-54%

## 2023-09-16 NOTE — SUBJECTIVE & OBJECTIVE
Subjective:     Interval History: POCT glucose this . Patient with nontender abdomen this AM, more responsive     Post-Op Info:  * No surgery found *          Medications:  Continuous Infusions:   sodium chloride 0.9% 125 mL/hr (09/16/23 0318)    dextrose 5 % and 0.45 % NaCl      dextrose 5 % and 0.45 % NaCl      insulin regular 1 units/mL infusion orderable (DKA) 0.01 Units/kg/hr (09/16/23 0705)     Scheduled Meds:   aspirin  325 mg Oral ED 1 Time    ciprofloxacin  400 mg Intravenous Q8H    famotidine (PF)  20 mg Intravenous Q12H    heparin (porcine)  5,000 Units Subcutaneous Q8H    labetaloL  100 mg Oral TID WM    metronidazole  500 mg Intravenous Q8H    potassium chloride  10 mEq Intravenous Q1H    sodium chloride 0.9%  1,000 mL Intravenous Once     PRN Meds:   acetaminophen    dextrose 10%    dextrose 10%    dextrose 10%    dextrose 10%    dextrose 5 % and 0.45 % NaCl    dextrose 5 % and 0.45 % NaCl    melatonin    ondansetron    polyethylene glycol    prochlorperazine    sodium chloride 0.9%    sodium chloride 0.9%        Objective:     Vital Signs (Most Recent):  Temp: 98.8 °F (37.1 °C) (09/16/23 0753)  Pulse: (!) 59 (09/16/23 0753)  Resp: 18 (09/16/23 0753)  BP: (!) 193/83 (09/16/23 0753)  SpO2: 99 % (09/16/23 0753) Vital Signs (24h Range):  Temp:  [96.8 °F (36 °C)-99 °F (37.2 °C)] 98.8 °F (37.1 °C)  Pulse:  [58-81] 59  Resp:  [16-39] 18  SpO2:  [92 %-100 %] 99 %  BP: (118-201)/() 193/83     Intake/Output - Last 3 Shifts         09/14 0700  09/15 0659 09/15 0700  09/16 0659 09/16 0700 09/17 0659    I.V. (mL/kg)  431.3 (4.7)     IV Piggyback  2350     Total Intake(mL/kg)  2781.3 (30.4)     Urine (mL/kg/hr)  1550 400 (3.1)    Total Output  1550 400    Net  +1231.3 -400                    Physical Exam  Constitutional:       Appearance: She is ill-appearing.      Comments: Lethargic yet responsive to touch and voice   Cardiovascular:      Rate and Rhythm: Normal rate.   Pulmonary:      Effort:  Date of Service: 03/16/2018    SURGEON:  John Guerra DPM.    ASSISTANT(S):   None.    ANESTHESIA:  Local with MAC.      PREOPERATIVE DIAGNOSIS:  Painful screw/hardware, left great toe.      POSTOPERATIVE DIAGNOSIS:  Painful screw/hardware, left great toe.      PROCEDURE PERFORMED:  Removal of painful screw/hardware, left great toe.      COMPLICATIONS:  None.      FINDINGS:  None.    ESTIMATED BLOOD LOSS:  Minimal.      SPECIMENS:  None.      DESCRIPTION OF PROCEDURE:  The patient was taken to the operating room and placed on the operating room table in the supine position.  Following induction of IV sedation, local anesthesia was obtained with a 1:1 mixture of 0.5% Marcaine plain and 1% Lidocaine plain.  Left foot was prepped and draped in the usual aseptic manner.  Attention was directed to the distal aspect of the left great toe where a 1 cm transverse incision was made at the distal tip of the toe.  This was deepened down to the level of the screw head which was then removed with a 4.0 cannulated Synthes screwdriver.  The area was copious irrigated.  Skin closure was obtained with 4-0 nylon with simple suture technique and a sterile dressing was applied with Maynor silk, 4x4 gauze, Kera and Coban.  The patient tolerated the above procedure and anesthesia well, left the operating room in apparent good condition, vital signs stable, afebrile, in no apparent distress.  Monitoring was continued in the postanesthesia care unit.      Dictated By: John Guerra DPM  Signing Provider: John Guerra DPM    CD/rizwan ([id number])  DD: 03/16/2018 12:37:17 TD: 03/16/2018 12:49:06    Copy Sent To:    Pulmonary effort is normal. No respiratory distress.   Abdominal:      General: There is no distension.      Palpations: Abdomen is soft.   Skin:     General: Skin is warm and dry.            Significant Labs:  BMP:   Recent Labs   Lab 09/16/23 0325   *  530*     143   K 4.2  4.2   *  113*   CO2 14*  14*   BUN 21*  21*   CREATININE 1.0  1.0   CALCIUM 7.1*  7.1*   MG 1.8     CBC:   Recent Labs   Lab 09/16/23 0325 09/16/23 0332   WBC 10.16  --    RBC 4.33  --    HGB 11.2*  --    HCT 37.2 35*   *  --    MCV 86  --    MCH 25.9*  --    MCHC 30.1*  --      CRP:   Recent Labs   Lab 09/16/23 0325   CRP 4.5       Significant Diagnostics:  I have reviewed all pertinent imaging results/findings within the past 24 hours.

## 2023-09-16 NOTE — ASSESSMENT & PLAN NOTE
Pt has elevated BNP (369) on admission, with hx suggestive of diastolic heart failure. Pt currently satting well

## 2023-09-16 NOTE — ASSESSMENT & PLAN NOTE
-CT A/P concerning for diverticulitis w/ perforation  - Colorectal consulted, surgery not indicated   - Started on IV Cipro/Flagyl

## 2023-09-16 NOTE — NURSING
Nurses Note -- 4 Eyes      Skin assessed during: Admit      [x] No Altered Skin Integrity Present    []Prevention Measures Documented      [] Yes- Altered Skin Integrity Present or Discovered   [] LDA Added if Not in Epic (Describe Wound)   [] New Altered Skin Integrity was Present on Admit and Documented in LDA   [] Wound Image Taken    Wound Care Consulted? No     Attending Nurse:  Charmaine ZapataRN     Second RN/Staff Member:  Jayleen Costa RN  Patient arrived and transferred from stretcher to bed. Turned and cleaned. Attached to Tele box and SpO2 IVF verified with Insulin infusing at rate per MAR. No CBG active or reported since 0333. Obtained CBG and MD's to bedside. IVF bolus initiated as ordered with Insulin per protocol per MAR. Patient rolled and turned new Purewick placed as Patient saturated with urine upon transfer. 0.9%NS20K hanging and per order of Dr Lau initiated at 75ml/hr. Patient noted to respond to verbal stimuli with additional verbal prodding. However remains very lethargic. Restraints per bilateral wrist and order verified.

## 2023-09-16 NOTE — RESPIRATORY THERAPY
RAPID RESPONSE RESPIRATORY THERAPY NOTE             Code Status: Full Code   : 1965  Bed: 8071/8071 A:   MRN: [id number]  Time page Received: 0840  Time Rapid Response RT at Bedside: 0842  Time Rapid Response RT left Bedside: 0856    SITUATION    Evaluated patient for: Altered mental status    BACKGROUND    Why is the patient in the hospital?: DKA (diabetic ketoacidosis)    Patient has a past medical history of COPD (chronic obstructive pulmonary disease), GERD (gastroesophageal reflux disease), Hypertension, and Seasonal allergies.    24 Hours Vitals Range:  Temp:  [96.8 °F (36 °C)-99 °F (37.2 °C)]   Pulse:  [58-81]   Resp:  [16-39]   BP: (118-201)/()   SpO2:  [92 %-100 %]     Labs:    Recent Labs     09/15/23  1902 09/15/23  2338 23  0325   * 140 143  143   K 4.7 2.9* 4.2  4.2   CL 91* 113* 113*  113*   CO2 26 19* 14*  14*   BUN 31* 21* 21*  21*   CREATININE 2.1* 0.8 1.0  1.0   GLU 1,130* 575* 530*  530*   PHOS  --   --  2.7   MG  --   --  1.8        Recent Labs     09/15/23  1910   PH 7.364   PCO2 55.2*   PO2 35*   HCO3 31.5*   POCSATURATED 63*   BE 6       ASSESSMENT/INTERVENTIONS  Patient responsive to voice. On 3LNC SpO2 99%, HR 60, RR 14. No respiratory distress noted, only lethargy. Patient's family member beside stated patient does not wear oxygen at home. Reduced flow to 1L with no change in respiratory status. VBG obtained and physician not concerned with values. No respiratory concerns at this time    Last Vitals: Temp: 97.6 °F (36.4 °C) (842)  Pulse: 60 (842)  Resp: 16 (842)  BP: 177/80 (842)  SpO2: 99 % (842)  Level of Consciousness: Level of Consciousness (AVPU): responds to voice  Respiratory Effort: Respiratory Effort: Normal, Unlabored  Expansion/Accessory Muscle Usage: Expansion/Accessory Muscles/Retractions: no use of accessory muscles, no retractions, expansion symmetric  All Lung Field Breath Sounds:    O2 Device/Concentration:  1LNC  NIPPV: No Surgical airway: No Vent: No  ETCO2 monitored: ETCO2 (mmHg): 57 mmHg  Ambu at bedside:      Active Orders   Respiratory Care    Inhalation Treatment Q6H PRN     Frequency: Q6H PRN     Number of Occurrences: Until Specified    POCT Venous Blood Gas     Frequency: Once     Number of Occurrences: 1 Occurrences     Order Comments: This test should be used for VBGs.  If using this order for other tests (K, creatinine, HCT, PT/INR, lactate etc)  ONLY do so in the case of an emergency or rapid response.Notify Physician if: see parameters below.         Order Questions:      Component: Blood Gas    Pulse Oximetry Continuous     Frequency: Continuous     Number of Occurrences: Until Specified       RECOMMENDATIONS  ?  We recommend: RRT Recs: Continue POC per primary team.    FOLLOW-UP    Disposition: Remain in room 8071.    Please call back the Rapid Response RT, Betzy Lang RRT at x [phone] for any questions or concerns.

## 2023-09-16 NOTE — ASSESSMENT & PLAN NOTE
Pt stable. On multiple medications, unsure of which she is currently taking 2/2 AMS. Will revisit as her mentation improves

## 2023-09-16 NOTE — H&P
"[facility] - Telemetry [facility] Medicine  History & Physical    Patient Name: Olga Lopez  MRN: [id number]  Patient Class: IP- Inpatient  Admission Date: 9/15/2023  Attending Physician: Courtney Canas*   Primary Care Provider: Jonh Berg MD         Patient information was obtained from ER records.     Subjective:     Principal Problem:<principal problem not specified>    Chief Complaint:   Chief Complaint   Patient presents with    Altered Mental Status     Lethargic x 3 days, CBG reads "high". Stopped taking her metformin last week     Shortness of Breath     High end tidal 70, after duoneb 62        HPI: 57F with PMHx of HTN, HLD, GERD, COPD presented to the ED with elevated BG and AMS. Pt currently prescribed metformin, but has not recently taken it. On admission to the ED pt denied having HA or CP Any further history is limited by pt mental status.    ED course: afebrile, HDS. Initial labs K 4.7, Glucose 1130, bicarb 26, AG 18,  BHB 1.1, Ph 7.36, Cr 2.1, Lactate 2.7, Ca 11, AST 66, , Lipase 77. , troponin 0.085. EKG NSR @ 64 bpm with LVH associated changes. Patient received 1L NS bolus, aspirin 325 mg, KCL oral and IV, empiric vancomycin and zosyn. She was started on insulin bolus and infusion. Repeat glucose improved to 580., however, K dropped to 2.9 and insulin gtt was held while K was replaced. Pt continued on insulin gtt at 0.1u/kg/hr and now at 460. Bicarb 26 on arrival, downtrended to 14. Lactate cleared. Additionally, CTAP positive for suspected localized free intraperitoneal air adjacent to the distal descending colon. Evaluated by CRS, no emergent surgical intervention, started on cipro/flagyl.       Past Medical History:   Diagnosis Date    COPD (chronic obstructive pulmonary disease)     GERD (gastroesophageal reflux disease)     Hypertension     Seasonal allergies        Past Surgical History:   Procedure Laterality Date    bladder lift  1994    " BREAST SURGERY      EXCISION OF LESION OF THYROID      EYE SURGERY Right 1968    HYSTERECTOMY      OPEN REDUCTION AND INTERNAL FIXATION (ORIF) OF FRACTURE OF OLECRANON PROCESS OF ULNA Right 9/30/2020    Procedure: ORIF, FRACTURE, OLECRANON;  Surgeon: Luis Davidson MD;  Location: [facility];  Service: Orthopedics;  Laterality: Right;  too instruments, k-wire and cable     C-ARM    R Breast Mass Removal Right 02/27/2020    benign    REDUCTION OF BOTH BREASTS      REMOVAL OF HARDWARE FROM UPPER EXTREMITY  05/10/2021    right arm. removal orthopedic hardware     TUBAL LIGATION         Review of patient's allergies indicates:   Allergen Reactions    Vibramycin [doxycycline calcium] Hives    Codeine     Pcn [penicillins]     Sulfa (sulfonamide antibiotics)     Tetracyclines        Current Facility-Administered Medications on File Prior to Encounter   Medication    0.9%  NaCl infusion    lactated ringers infusion     Current Outpatient Medications on File Prior to Encounter   Medication Sig    acetaminophen (TYLENOL) 500 MG tablet Take 500 mg by mouth 2 (two) times daily.    amLODIPine (NORVASC) 2.5 MG tablet Take 2 tablets (5 mg total) by mouth once daily. Patient reports she was taking 5 daily not twice a day    aspirin (ECOTRIN) 81 MG EC tablet Take 81 mg by mouth once daily.    atorvastatin (LIPITOR) 40 MG tablet Take 40 mg by mouth once daily.    blood sugar diagnostic Strp One test strip use 1 times a day to check blood glucose,  ICD-10: E11.9, compatible with insurance/glucometer    blood-glucose meter kit One glucometer, use to check 1 times a day.   ICD-10: E11.9,  Dispense machine covered by insurance    CETIRIZINE HCL (ZYRTEC ORAL) Take by mouth.    furosemide (LASIX) 40 MG tablet Take One tablet by mouth twice a day for 5 days, then take one tablet daily    HYDROcodone-acetaminophen (NORCO) 5-325 mg per tablet Take 1 tablet by mouth every 4 (four) hours as needed for Pain.     labetaloL (NORMODYNE) 100 MG tablet Take 100 mg by mouth 3 (three) times daily with meals.    lancets Misc One lancets use 1 times a day to check blood glucose, ICD-10: E11.9    lancing device Misc One device, used to check blood glucose, ICD-10: E11.9    levothyroxine (SYNTHROID) 50 MCG tablet Take 1 tablet (50 mcg total) by mouth before breakfast.    metFORMIN (GLUCOPHAGE-XR) 500 MG ER 24hr tablet Take 2 tablets with breakfast and 1 tablet with supper.    omega-3 fatty acids/fish oil (FISH OIL-OMEGA-3 FATTY ACIDS) 300-1,000 mg capsule Take by mouth once daily.    omeprazole (PRILOSEC) 20 MG capsule Take 20 mg by mouth once daily.    ondansetron (ZOFRAN) 4 MG tablet Take 1 tablet (4 mg total) by mouth every 6 (six) hours.    valsartan (DIOVAN) 320 MG tablet Take 1 tablet (320 mg total) by mouth once daily.     Family History    None       Tobacco Use    Smoking status: Never    Smokeless tobacco: Never   Substance and Sexual Activity    Alcohol use: No    Drug use: Never    Sexual activity: Not on file     Review of Systems   Unable to perform ROS: Mental status change     Objective:     Vital Signs (Most Recent):  Temp: 97.6 °F (36.4 °C) (09/16/23 0545)  Pulse: (!) 58 (09/16/23 0545)  Resp: (!) 22 (09/16/23 0545)  BP: 118/62 (09/16/23 0545)  SpO2: (!) 92 % (09/16/23 0545) Vital Signs (24h Range):  Temp:  [96.8 °F (36 °C)-99 °F (37.2 °C)] 97.6 °F (36.4 °C)  Pulse:  [58-81] 58  Resp:  [16-39] 22  SpO2:  [92 %-100 %] 92 %  BP: (118-201)/() 118/62     Weight: 91.4 kg (201 lb 8 oz)  Body mass index is 38.07 kg/m².     Physical Exam  Vitals reviewed.   Constitutional:       General: She is not in acute distress.     Appearance: She is obese. She is ill-appearing. She is not diaphoretic.   HENT:      Head: Atraumatic.      Mouth/Throat:      Mouth: Mucous membranes are dry.   Eyes:      Pupils: Pupils are equal, round, and reactive to light.   Cardiovascular:      Rate and Rhythm: Normal rate.       Heart sounds: No murmur heard.  Pulmonary:      Effort: Pulmonary effort is normal. No respiratory distress.      Breath sounds: Normal breath sounds. No rales.   Abdominal:      General: There is no distension.      Palpations: Abdomen is soft.      Tenderness: There is no abdominal tenderness.   Musculoskeletal:         General: No swelling or tenderness.      Cervical back: No tenderness.      Right lower leg: No edema.      Left lower leg: No edema.   Skin:     General: Skin is warm and dry.      Coloration: Skin is not jaundiced.   Neurological:      Comments: Somnolent, likely 2/2 ativan. Oriented to self and place.              CRANIAL NERVES     CN III, IV, VI   Pupils are equal, round, and reactive to light.       Significant Labs: All pertinent labs within the past 24 hours have been reviewed.  ABGs:   Recent Labs   Lab 09/15/23  1910   PH 7.364   PCO2 55.2*   HCO3 31.5*   POCSATURATED 63*   BE 6   PO2 35*     CBC:   Recent Labs   Lab 09/15/23  1902 09/15/23  2252 09/16/23  0053 09/16/23  0325 09/16/23  0332   WBC 10.61  --   --  10.16  --    HGB 15.7  --   --  11.2*  --    HCT 50.2*   < > 30* 37.2 35*     --   --  145*  --     < > = values in this interval not displayed.     CMP:   Recent Labs   Lab 09/15/23  1902 09/15/23  2338 09/16/23  0325   * 140 143  143   K 4.7 2.9* 4.2  4.2   CL 91* 113* 113*  113*   CO2 26 19* 14*  14*   GLU 1,130* 575* 530*  530*   BUN 31* 21* 21*  21*   CREATININE 2.1* 0.8 1.0  1.0   CALCIUM 10.9* 6.4* 7.1*  7.1*   PROT 8.7* 4.8* 5.5*   ALBUMIN 3.4* 1.9* 2.1*   BILITOT 0.4 0.2 0.2   ALKPHOS 308* 159* 198*   AST 66* 40 53*   * 65* 71*   ANIONGAP 18* 8 16  16     Cardiac Markers:   Recent Labs   Lab 09/15/23  1902   *     Lactic Acid:   Recent Labs   Lab 09/16/23  0325   LACTATE 1.5     POCT Glucose:   Recent Labs   Lab 09/16/23  0030 09/16/23  0318 09/16/23  0550   POCTGLUCOSE >500* >500* 461*     Troponin:   Recent Labs   Lab 09/15/23  1902  "09/15/23  2155   TROPONINI 0.085* 0.081*     TSH: No results for input(s): "TSH" in the last 4320 hours.  Urine Studies:   Recent Labs   Lab 09/15/23  2033   COLORU Straw   APPEARANCEUA Hazy*   PHUR 6.0   SPECGRAV 1.030   PROTEINUA 1+*   GLUCUA 3+*   KETONESU Trace*   BILIRUBINUA Negative   OCCULTUA 1+*   NITRITE Negative   LEUKOCYTESUR Negative   RBCUA 4   WBCUA 3   BACTERIA Rare   SQUAMEPITHEL 4   HYALINECASTS 0       Significant Imaging: I have reviewed all pertinent imaging results/findings within the past 24 hours.    Assessment/Plan:     ROBERTO (acute kidney injury)  Creatinine 2.1 on admit, baseline around 0.8  - Likely pre-renal from dehydration and volume losses  - improved with fluid resuscitation  Plan:     - Check urine lytes  - Check urine protein creatinine ratio  - Strict I&Os and daily weights   - Avoid nephrotoxic agents such as NSAIDs, gadolinium and IV radiocontrast  - Renally dose meds to current GFR  - Maintain MAP > 65      Elevated brain natriuretic peptide (BNP) level  Pt has hx diastolic heart failure,  on admission, CXR with evidence of possible early congestion. Exam without evidence of volume overload, however pt is requiring 3L NC to maintain O2 sats >95%.     Plan  - TTE pending  - Pt on home valsartan and labetalol, unsure of compliance  - Lasix or spironolactone prn      DKA (diabetic ketoacidosis)  Blood sugar on arrival 1130 with a bicarb 26, anion gap 18, BHB 1.1.  HbA1c pending  Likely induced by medication noncompliance, abd infection/possible perforated diverticula  Home DM regimen:  metformin 500qd    Plan:  - DKA/HHS Pathway initiated  - Start insulin gtt per protocol with q1h accuchecks while on the drip  - Once Bicarb >18, Anion gap <12, Glucose <200 on 2 readings and able to tolerate PO intake without nausea and vomiting, transition to subq insulin with a 1-2 hr overlap with drip  · Long acting insulin dose:  Detemir (0.25mg/kg) daily or in 2 doses  · Short acting " insulin dose:  Aspart (0.08mg/kg) units per meal  - Start normal saline at 125cc/hr.  Once blood sugar is 200, change IVF to D5 1/2 NS at 50cc/hr  - Monitor electrolytes with BMP q4h while on insulin gtt and place on telemetry  - Bariatric clear liquid diet while on insulin gtt then change to diabetic diet when initiating subq insulin with accuchecks 4x daily before meals and at bedtime (AC and HS)  - Endocrine consult, appreciate recs      COPD (chronic obstructive pulmonary disease)  - pCO2 elevated to 55 on arrival with hx of COPD. No wheezing on exam, breath sounds nl, CXR without evidence of hyperinflation/air trapping    Plan  - duonebs prn      GERD (gastroesophageal reflux disease)  IV PPI while NPO      Hyperlipidemia  Continue home statin when no longer NPO      HTN (hypertension)  Pt stable. On multiple medications, unsure of which she is currently taking 2/2 AMS. Will revisit as her mentation improves        VTE Risk Mitigation (From admission, onward)         Ordered     heparin (porcine) injection 5,000 Units  Every 8 hours (non-standard times)         09/16/23 0312     IP VTE HIGH RISK PATIENT  Once         09/16/23 0312     Place KADI hose  Until discontinued         09/16/23 0312     Place sequential compression device  Until discontinued         09/16/23 0312                           Ld Villa MD  Department of Hospital Medicine  [facility] - Telemetry Stepdown

## 2023-09-17 LAB
ALBUMIN SERPL BCP-MCNC: 2.4 G/DL (ref 3.5–5.2)
ALP SERPL-CCNC: 185 U/L (ref 55–135)
ALT SERPL W/O P-5'-P-CCNC: 71 U/L (ref 10–44)
ANION GAP SERPL CALC-SCNC: 11 MMOL/L (ref 8–16)
ANION GAP SERPL CALC-SCNC: 12 MMOL/L (ref 8–16)
ANION GAP SERPL CALC-SCNC: 12 MMOL/L (ref 8–16)
ANION GAP SERPL CALC-SCNC: 13 MMOL/L (ref 8–16)
ANION GAP SERPL CALC-SCNC: 13 MMOL/L (ref 8–16)
ANION GAP SERPL CALC-SCNC: 15 MMOL/L (ref 8–16)
ANION GAP SERPL CALC-SCNC: 16 MMOL/L (ref 8–16)
ANION GAP SERPL CALC-SCNC: 16 MMOL/L (ref 8–16)
AST SERPL-CCNC: 69 U/L (ref 10–40)
BASOPHILS # BLD AUTO: 0.01 K/UL (ref 0–0.2)
BASOPHILS NFR BLD: 0.1 % (ref 0–1.9)
BILIRUB SERPL-MCNC: 0.5 MG/DL (ref 0.1–1)
BUN SERPL-MCNC: 11 MG/DL (ref 6–20)
BUN SERPL-MCNC: 12 MG/DL (ref 6–20)
BUN SERPL-MCNC: 12 MG/DL (ref 6–20)
BUN SERPL-MCNC: 13 MG/DL (ref 6–20)
BUN SERPL-MCNC: 14 MG/DL (ref 6–20)
CALCIUM SERPL-MCNC: 7.9 MG/DL (ref 8.7–10.5)
CALCIUM SERPL-MCNC: 8 MG/DL (ref 8.7–10.5)
CALCIUM SERPL-MCNC: 8.1 MG/DL (ref 8.7–10.5)
CHLORIDE SERPL-SCNC: 100 MMOL/L (ref 95–110)
CHLORIDE SERPL-SCNC: 102 MMOL/L (ref 95–110)
CHLORIDE SERPL-SCNC: 103 MMOL/L (ref 95–110)
CHLORIDE SERPL-SCNC: 105 MMOL/L (ref 95–110)
CHLORIDE SERPL-SCNC: 106 MMOL/L (ref 95–110)
CHLORIDE SERPL-SCNC: 110 MMOL/L (ref 95–110)
CO2 SERPL-SCNC: 17 MMOL/L (ref 23–29)
CO2 SERPL-SCNC: 18 MMOL/L (ref 23–29)
CO2 SERPL-SCNC: 18 MMOL/L (ref 23–29)
CO2 SERPL-SCNC: 20 MMOL/L (ref 23–29)
CO2 SERPL-SCNC: 20 MMOL/L (ref 23–29)
CO2 SERPL-SCNC: 23 MMOL/L (ref 23–29)
CREAT SERPL-MCNC: 0.7 MG/DL (ref 0.5–1.4)
CREAT SERPL-MCNC: 0.8 MG/DL (ref 0.5–1.4)
CREAT SERPL-MCNC: 0.9 MG/DL (ref 0.5–1.4)
DIFFERENTIAL METHOD: ABNORMAL
EOSINOPHIL # BLD AUTO: 0 K/UL (ref 0–0.5)
EOSINOPHIL NFR BLD: 0.2 % (ref 0–8)
ERYTHROCYTE [DISTWIDTH] IN BLOOD BY AUTOMATED COUNT: 14.6 % (ref 11.5–14.5)
EST. GFR  (NO RACE VARIABLE): >60 ML/MIN/1.73 M^2
ESTIMATED AVG GLUCOSE: ABNORMAL MG/DL (ref 68–131)
GLUCOSE SERPL-MCNC: 290 MG/DL (ref 70–110)
GLUCOSE SERPL-MCNC: 291 MG/DL (ref 70–110)
GLUCOSE SERPL-MCNC: 291 MG/DL (ref 70–110)
GLUCOSE SERPL-MCNC: 292 MG/DL (ref 70–110)
GLUCOSE SERPL-MCNC: 307 MG/DL (ref 70–110)
GLUCOSE SERPL-MCNC: 314 MG/DL (ref 70–110)
GLUCOSE SERPL-MCNC: 325 MG/DL (ref 70–110)
GLUCOSE SERPL-MCNC: 335 MG/DL (ref 70–110)
HBA1C MFR BLD: >14 % (ref 4–5.6)
HCT VFR BLD AUTO: 39.8 % (ref 37–48.5)
HGB BLD-MCNC: 12 G/DL (ref 12–16)
IMM GRANULOCYTES # BLD AUTO: 0.04 K/UL (ref 0–0.04)
IMM GRANULOCYTES NFR BLD AUTO: 0.5 % (ref 0–0.5)
LYMPHOCYTES # BLD AUTO: 0.8 K/UL (ref 1–4.8)
LYMPHOCYTES NFR BLD: 10 % (ref 18–48)
MAGNESIUM SERPL-MCNC: 1.7 MG/DL (ref 1.6–2.6)
MCH RBC QN AUTO: 26 PG (ref 27–31)
MCHC RBC AUTO-ENTMCNC: 30.2 G/DL (ref 32–36)
MCV RBC AUTO: 86 FL (ref 82–98)
MONOCYTES # BLD AUTO: 0.4 K/UL (ref 0.3–1)
MONOCYTES NFR BLD: 4.4 % (ref 4–15)
NEUTROPHILS # BLD AUTO: 7.2 K/UL (ref 1.8–7.7)
NEUTROPHILS NFR BLD: 84.8 % (ref 38–73)
NRBC BLD-RTO: 0 /100 WBC
PHOSPHATE SERPL-MCNC: 1.5 MG/DL (ref 2.7–4.5)
PLATELET # BLD AUTO: 166 K/UL (ref 150–450)
PMV BLD AUTO: 11.4 FL (ref 9.2–12.9)
POCT GLUCOSE: 240 MG/DL (ref 70–110)
POCT GLUCOSE: 254 MG/DL (ref 70–110)
POCT GLUCOSE: 256 MG/DL (ref 70–110)
POCT GLUCOSE: 257 MG/DL (ref 70–110)
POCT GLUCOSE: 261 MG/DL (ref 70–110)
POCT GLUCOSE: 271 MG/DL (ref 70–110)
POCT GLUCOSE: 277 MG/DL (ref 70–110)
POCT GLUCOSE: 282 MG/DL (ref 70–110)
POCT GLUCOSE: 290 MG/DL (ref 70–110)
POCT GLUCOSE: 291 MG/DL (ref 70–110)
POCT GLUCOSE: 292 MG/DL (ref 70–110)
POCT GLUCOSE: 292 MG/DL (ref 70–110)
POCT GLUCOSE: 294 MG/DL (ref 70–110)
POCT GLUCOSE: 309 MG/DL (ref 70–110)
POCT GLUCOSE: 326 MG/DL (ref 70–110)
POCT GLUCOSE: 330 MG/DL (ref 70–110)
POTASSIUM SERPL-SCNC: 2.8 MMOL/L (ref 3.5–5.1)
POTASSIUM SERPL-SCNC: 3 MMOL/L (ref 3.5–5.1)
POTASSIUM SERPL-SCNC: 3 MMOL/L (ref 3.5–5.1)
POTASSIUM SERPL-SCNC: 3.1 MMOL/L (ref 3.5–5.1)
POTASSIUM SERPL-SCNC: 3.1 MMOL/L (ref 3.5–5.1)
POTASSIUM SERPL-SCNC: 3.2 MMOL/L (ref 3.5–5.1)
POTASSIUM SERPL-SCNC: 3.2 MMOL/L (ref 3.5–5.1)
POTASSIUM SERPL-SCNC: 3.8 MMOL/L (ref 3.5–5.1)
PROT SERPL-MCNC: 6 G/DL (ref 6–8.4)
RBC # BLD AUTO: 4.62 M/UL (ref 4–5.4)
SODIUM SERPL-SCNC: 135 MMOL/L (ref 136–145)
SODIUM SERPL-SCNC: 136 MMOL/L (ref 136–145)
SODIUM SERPL-SCNC: 136 MMOL/L (ref 136–145)
SODIUM SERPL-SCNC: 139 MMOL/L (ref 136–145)
SODIUM SERPL-SCNC: 140 MMOL/L (ref 136–145)
SODIUM SERPL-SCNC: 144 MMOL/L (ref 136–145)
WBC # BLD AUTO: 8.43 K/UL (ref 3.9–12.7)

## 2023-09-17 PROCEDURE — 80048 BASIC METABOLIC PNL TOTAL CA: CPT

## 2023-09-17 PROCEDURE — 94761 N-INVAS EAR/PLS OXIMETRY MLT: CPT

## 2023-09-17 PROCEDURE — 25000003 PHARM REV CODE 250

## 2023-09-17 PROCEDURE — 99233 SBSQ HOSP IP/OBS HIGH 50: CPT | Mod: ,,, | Performed by: STUDENT IN AN ORGANIZED HEALTH CARE EDUCATION/TRAINING PROGRAM

## 2023-09-17 PROCEDURE — 85025 COMPLETE CBC W/AUTO DIFF WBC: CPT | Performed by: HOSPITALIST

## 2023-09-17 PROCEDURE — 99233 SBSQ HOSP IP/OBS HIGH 50: CPT | Mod: ,,, | Performed by: HOSPITALIST

## 2023-09-17 PROCEDURE — 63600175 PHARM REV CODE 636 W HCPCS

## 2023-09-17 PROCEDURE — 83036 HEMOGLOBIN GLYCOSYLATED A1C: CPT

## 2023-09-17 PROCEDURE — 84100 ASSAY OF PHOSPHORUS: CPT | Performed by: HOSPITALIST

## 2023-09-17 PROCEDURE — 36415 COLL VENOUS BLD VENIPUNCTURE: CPT

## 2023-09-17 PROCEDURE — 63600175 PHARM REV CODE 636 W HCPCS: Performed by: STUDENT IN AN ORGANIZED HEALTH CARE EDUCATION/TRAINING PROGRAM

## 2023-09-17 PROCEDURE — 94640 AIRWAY INHALATION TREATMENT: CPT

## 2023-09-17 PROCEDURE — 20600001 HC STEP DOWN PRIVATE ROOM

## 2023-09-17 PROCEDURE — 80053 COMPREHEN METABOLIC PANEL: CPT | Performed by: HOSPITALIST

## 2023-09-17 PROCEDURE — 99233 PR SUBSEQUENT HOSPITAL CARE,LEVL III: ICD-10-PCS | Mod: ,,, | Performed by: HOSPITALIST

## 2023-09-17 PROCEDURE — 99233 PR SUBSEQUENT HOSPITAL CARE,LEVL III: ICD-10-PCS | Mod: ,,, | Performed by: STUDENT IN AN ORGANIZED HEALTH CARE EDUCATION/TRAINING PROGRAM

## 2023-09-17 PROCEDURE — 80048 BASIC METABOLIC PNL TOTAL CA: CPT | Mod: 91,XB

## 2023-09-17 PROCEDURE — 83735 ASSAY OF MAGNESIUM: CPT | Performed by: HOSPITALIST

## 2023-09-17 PROCEDURE — 25000242 PHARM REV CODE 250 ALT 637 W/ HCPCS

## 2023-09-17 RX ORDER — POTASSIUM CHLORIDE 7.45 MG/ML
10 INJECTION INTRAVENOUS
Status: DISCONTINUED | OUTPATIENT
Start: 2023-09-17 | End: 2023-09-17

## 2023-09-17 RX ORDER — LOSARTAN POTASSIUM AND HYDROCHLOROTHIAZIDE 25; 100 MG/1; MG/1
1 TABLET ORAL DAILY
Status: DISCONTINUED | OUTPATIENT
Start: 2023-09-17 | End: 2023-09-22 | Stop reason: HOSPADM

## 2023-09-17 RX ORDER — INSULIN ASPART 100 [IU]/ML
0-5 INJECTION, SOLUTION INTRAVENOUS; SUBCUTANEOUS
Status: DISCONTINUED | OUTPATIENT
Start: 2023-09-17 | End: 2023-09-19

## 2023-09-17 RX ORDER — FUROSEMIDE 40 MG/1
40 TABLET ORAL DAILY
Status: DISCONTINUED | OUTPATIENT
Start: 2023-09-17 | End: 2023-09-17

## 2023-09-17 RX ORDER — LEVOTHYROXINE SODIUM 50 UG/1
50 TABLET ORAL
Status: DISCONTINUED | OUTPATIENT
Start: 2023-09-17 | End: 2023-09-22 | Stop reason: HOSPADM

## 2023-09-17 RX ORDER — POTASSIUM CHLORIDE 20 MEQ/1
40 TABLET, EXTENDED RELEASE ORAL
Status: COMPLETED | OUTPATIENT
Start: 2023-09-17 | End: 2023-09-17

## 2023-09-17 RX ORDER — HYDRALAZINE HYDROCHLORIDE 50 MG/1
50 TABLET, FILM COATED ORAL EVERY 8 HOURS PRN
Status: DISCONTINUED | OUTPATIENT
Start: 2023-09-17 | End: 2023-09-22 | Stop reason: HOSPADM

## 2023-09-17 RX ORDER — METOPROLOL SUCCINATE 100 MG/1
100 TABLET, EXTENDED RELEASE ORAL DAILY
Status: DISCONTINUED | OUTPATIENT
Start: 2023-09-17 | End: 2023-09-22 | Stop reason: HOSPADM

## 2023-09-17 RX ORDER — ALBUTEROL SULFATE 90 UG/1
2 AEROSOL, METERED RESPIRATORY (INHALATION) DAILY
Status: DISCONTINUED | OUTPATIENT
Start: 2023-09-17 | End: 2023-09-22 | Stop reason: HOSPADM

## 2023-09-17 RX ORDER — SODIUM,POTASSIUM PHOSPHATES 280-250MG
2 POWDER IN PACKET (EA) ORAL 3 TIMES DAILY
Status: COMPLETED | OUTPATIENT
Start: 2023-09-17 | End: 2023-09-18

## 2023-09-17 RX ORDER — GLUCAGON 1 MG
1 KIT INJECTION
Status: DISCONTINUED | OUTPATIENT
Start: 2023-09-17 | End: 2023-09-22 | Stop reason: HOSPADM

## 2023-09-17 RX ORDER — IBUPROFEN 200 MG
24 TABLET ORAL
Status: DISCONTINUED | OUTPATIENT
Start: 2023-09-17 | End: 2023-09-22 | Stop reason: HOSPADM

## 2023-09-17 RX ORDER — INSULIN ASPART 100 [IU]/ML
6 INJECTION, SOLUTION INTRAVENOUS; SUBCUTANEOUS
Status: CANCELLED | OUTPATIENT
Start: 2023-09-17

## 2023-09-17 RX ORDER — POTASSIUM CHLORIDE 7.45 MG/ML
10 INJECTION INTRAVENOUS
Status: DISPENSED | OUTPATIENT
Start: 2023-09-17 | End: 2023-09-17

## 2023-09-17 RX ORDER — IBUPROFEN 200 MG
16 TABLET ORAL
Status: DISCONTINUED | OUTPATIENT
Start: 2023-09-17 | End: 2023-09-22 | Stop reason: HOSPADM

## 2023-09-17 RX ORDER — INSULIN ASPART 100 [IU]/ML
6 INJECTION, SOLUTION INTRAVENOUS; SUBCUTANEOUS
Status: DISCONTINUED | OUTPATIENT
Start: 2023-09-17 | End: 2023-09-18

## 2023-09-17 RX ADMIN — LOSARTAN POTASSIUM AND HYDROCHLOROTHIAZIDE 1 TABLET: 100; 25 TABLET, FILM COATED ORAL at 08:09

## 2023-09-17 RX ADMIN — HEPARIN SODIUM 5000 UNITS: 5000 INJECTION INTRAVENOUS; SUBCUTANEOUS at 09:09

## 2023-09-17 RX ADMIN — POTASSIUM CHLORIDE 40 MEQ: 1500 TABLET, EXTENDED RELEASE ORAL at 06:09

## 2023-09-17 RX ADMIN — POTASSIUM CHLORIDE 40 MEQ: 1500 TABLET, EXTENDED RELEASE ORAL at 04:09

## 2023-09-17 RX ADMIN — ACETAMINOPHEN 650 MG: 325 TABLET ORAL at 08:09

## 2023-09-17 RX ADMIN — HYDRALAZINE HYDROCHLORIDE 50 MG: 50 TABLET ORAL at 09:09

## 2023-09-17 RX ADMIN — CIPROFLOXACIN 400 MG: 400 INJECTION, SOLUTION INTRAVENOUS at 05:09

## 2023-09-17 RX ADMIN — POTASSIUM & SODIUM PHOSPHATES POWDER PACK 280-160-250 MG 2 PACKET: 280-160-250 PACK at 04:09

## 2023-09-17 RX ADMIN — CIPROFLOXACIN 400 MG: 400 INJECTION, SOLUTION INTRAVENOUS at 01:09

## 2023-09-17 RX ADMIN — POTASSIUM CHLORIDE 40 MEQ: 1500 TABLET, EXTENDED RELEASE ORAL at 01:09

## 2023-09-17 RX ADMIN — POTASSIUM CHLORIDE 10 MEQ: 7.46 INJECTION, SOLUTION INTRAVENOUS at 04:09

## 2023-09-17 RX ADMIN — METRONIDAZOLE 500 MG: 5 INJECTION, SOLUTION INTRAVENOUS at 01:09

## 2023-09-17 RX ADMIN — ACETAMINOPHEN 650 MG: 325 TABLET ORAL at 09:09

## 2023-09-17 RX ADMIN — INSULIN DETEMIR 12 UNITS: 100 INJECTION, SOLUTION SUBCUTANEOUS at 09:09

## 2023-09-17 RX ADMIN — CIPROFLOXACIN 400 MG: 400 INJECTION, SOLUTION INTRAVENOUS at 09:09

## 2023-09-17 RX ADMIN — FAMOTIDINE 20 MG: 10 INJECTION, SOLUTION INTRAVENOUS at 08:09

## 2023-09-17 RX ADMIN — FAMOTIDINE 20 MG: 10 INJECTION, SOLUTION INTRAVENOUS at 09:09

## 2023-09-17 RX ADMIN — INSULIN DETEMIR 12 UNITS: 100 INJECTION, SOLUTION SUBCUTANEOUS at 01:09

## 2023-09-17 RX ADMIN — METOPROLOL SUCCINATE 100 MG: 100 TABLET, EXTENDED RELEASE ORAL at 11:09

## 2023-09-17 RX ADMIN — ALBUTEROL SULFATE 2 PUFF: 108 INHALANT RESPIRATORY (INHALATION) at 10:09

## 2023-09-17 RX ADMIN — HEPARIN SODIUM 5000 UNITS: 5000 INJECTION INTRAVENOUS; SUBCUTANEOUS at 03:09

## 2023-09-17 RX ADMIN — POTASSIUM CHLORIDE 10 MEQ: 7.46 INJECTION, SOLUTION INTRAVENOUS at 03:09

## 2023-09-17 RX ADMIN — METRONIDAZOLE 500 MG: 5 INJECTION, SOLUTION INTRAVENOUS at 07:09

## 2023-09-17 RX ADMIN — LEVOTHYROXINE SODIUM 50 MCG: 50 TABLET ORAL at 08:09

## 2023-09-17 RX ADMIN — INSULIN ASPART 6 UNITS: 100 INJECTION, SOLUTION INTRAVENOUS; SUBCUTANEOUS at 05:09

## 2023-09-17 RX ADMIN — METRONIDAZOLE 500 MG: 5 INJECTION, SOLUTION INTRAVENOUS at 10:09

## 2023-09-17 RX ADMIN — POTASSIUM & SODIUM PHOSPHATES POWDER PACK 280-160-250 MG 2 PACKET: 280-160-250 PACK at 09:09

## 2023-09-17 RX ADMIN — HEPARIN SODIUM 5000 UNITS: 5000 INJECTION INTRAVENOUS; SUBCUTANEOUS at 11:09

## 2023-09-17 RX ADMIN — INSULIN ASPART 2 UNITS: 100 INJECTION, SOLUTION INTRAVENOUS; SUBCUTANEOUS at 09:09

## 2023-09-17 NOTE — SUBJECTIVE & OBJECTIVE
Interval History: Patient agitated overnight, pulling at lines, required restraints. This morning more coherent, oriented to person, place, situation, answering questions appropriately but lethargic. Able to transition off insulin gtt today to diabetic diet, insulin detemir 12 units BID, 6 units aspart TIDWM and ldssi. Able to restart PO medications - metoprolol, lasix and losartan-HCTZ.    Review of Systems   Constitutional:  Negative for chills and fever.   Respiratory:  Negative for shortness of breath.    Cardiovascular:  Negative for chest pain and leg swelling.   Gastrointestinal:  Negative for abdominal pain, nausea and vomiting.   Musculoskeletal:  Negative for arthralgias and myalgias.   Neurological:  Positive for headaches. Negative for light-headedness.   Psychiatric/Behavioral:  Negative for agitation and confusion.      Objective:     Vital Signs (Most Recent):  Temp: 98 °F (36.7 °C) (09/17/23 1055)  Pulse: 83 (09/17/23 1437)  Resp: 17 (09/17/23 1055)  BP: (!) 190/82 (09/17/23 1055)  SpO2: 95 % (09/17/23 1300) Vital Signs (24h Range):  Temp:  [97.7 °F (36.5 °C)-98.7 °F (37.1 °C)] 98 °F (36.7 °C)  Pulse:  [54-90] 83  Resp:  [17-18] 17  SpO2:  [93 %-99 %] 95 %  BP: (173-224)/(74-99) 190/82     Weight: 91.4 kg (201 lb 8 oz)  Body mass index is 38.07 kg/m².    Intake/Output Summary (Last 24 hours) at 9/17/2023 1440  Last data filed at 9/17/2023 1423  Gross per 24 hour   Intake 480 ml   Output 2700 ml   Net -2220 ml         Physical Exam  Vitals reviewed.   Constitutional:       General: She is not in acute distress.     Comments: Resting comfortable, arousable to voice. Answering questions appropriately.   HENT:      Head: Normocephalic and atraumatic.   Eyes:      Extraocular Movements: Extraocular movements intact.      Pupils: Pupils are equal, round, and reactive to light.   Cardiovascular:      Rate and Rhythm: Normal rate and regular rhythm.   Pulmonary:      Effort: Pulmonary effort is normal.       Breath sounds: Normal breath sounds. No rales.   Abdominal:      General: Bowel sounds are normal.      Palpations: Abdomen is soft.      Tenderness: There is no abdominal tenderness.   Musculoskeletal:         General: Swelling (right hand) present.      Right lower leg: No edema.      Left lower leg: No edema.   Skin:     General: Skin is warm and dry.   Neurological:      General: No focal deficit present.      Mental Status: She is alert.             Significant Labs: All pertinent labs within the past 24 hours have been reviewed.    Significant Imaging: I have reviewed all pertinent imaging results/findings within the past 24 hours.

## 2023-09-17 NOTE — NURSING
Nurses Note -- 4 Eyes      9/17/2023   7:17 AM      Skin assessed during: Daily Assessment      [x] No Altered Skin Integrity Present    [x]Prevention Measures Documented      [] Yes- Altered Skin Integrity Present or Discovered   [] LDA Added if Not in Epic (Describe Wound)   [] New Altered Skin Integrity was Present on Admit and Documented in LDA   [] Wound Image Taken    Wound Care Consulted? No    Attending Nurse:  Sharee Swan RN    Second RN/Staff Member:   Althea Butt RN

## 2023-09-17 NOTE — ASSESSMENT & PLAN NOTE
Recent dispense report shows most recent BP meds include metoprolol- mg, losartan-HCTZ 100-25 mg, lasix 40 mg daily    - patient more alert today, able to tolerate PO intake  -restarting home anti-hypertensives

## 2023-09-17 NOTE — ASSESSMENT & PLAN NOTE
Pt with AMS, slightly improving  Blood sugar on arrival 1130 with a bicarb 26, anion gap 18, BHB 1.1.  HbA1c pending  Likely induced by medication noncompliance, abd infection/possible perforated diverticula  Home DM regimen:  metformin 500qd    Plan:  - DKA/HHS Pathway initiated  - Start insulin gtt per protocol with q1h accuchecks while on the drip  - Once Bicarb >18, Anion gap <12, Glucose <200 on 2 readings and able to tolerate PO intake without nausea and vomiting, transition to subq insulin with a 1-2 hr overlap with drip  · Long acting insulin dose:  Detemir (0.25mg/kg) daily or in 2 doses  · Short acting insulin dose:  Aspart (0.08mg/kg) units per meal  - Start normal saline at 125cc/hr.  Once blood sugar is 200, change IVF to D5 1/2 NS at 50cc/hr  - Monitor electrolytes with BMP q4h while on insulin gtt and place on telemetry  - Bariatric clear liquid diet while on insulin gtt then change to diabetic diet when initiating subq insulin with accuchecks 4x daily before meals and at bedtime (AC and HS)  - stopped insulin gtt today  - Determir 12 BID started, with aspart 6 units TIDWM and ldsssi  - Diabetic diet

## 2023-09-17 NOTE — ASSESSMENT & PLAN NOTE
- pCO2 elevated to 55 on arrival with hx of COPD. No wheezing on exam, breath sounds nl, CXR without evidence of hyperinflation/air trapping    Plan  - duonebs prn  -restarted inhaler tx daily

## 2023-09-17 NOTE — PLAN OF CARE
[facility] - Telemetry Stepdown  Initial Discharge Assessment       Primary Care Provider: Jonh Berg MD    Admission Diagnosis: DKA (diabetic ketoacidosis) [E11.10]  Hyperglycemia [R73.9]  ROBERTO (acute kidney injury) [N17.9]  Abnormal LFTs [R79.89]  Altered mental status, unspecified altered mental status type [R41.82]    Admission Date: 9/15/2023  Expected Discharge Date: 9/18/2023    Transition of Care Barriers: None    Payor: BLUE CROSS BLUE SHIELD / Plan: B[employer]Time FEDERAL BASIC / Product Type: PPO /     Extended Emergency Contact Information  Primary Emergency Contact: Edil Yoon  Address: [address] [place] [address]  Home Phone: [phone]  Mobile Phone: [phone]  Relation: Spouse  Secondary Emergency Contact: Mary Carmen Basurto  Mobile Phone: [phone]  Relation: Relative  Preferred language: English   needed? No    Discharge Plan A: Home, Home with family  Discharge Plan B: Home Health      [facility] - [id number] [facility] [address]  Phone: [phone] Fax: [phone]    [facility] [address] [facility] [address] [place] [address]  [address] [place] [address]  Phone: [phone] Fax: [phone]    SW met with patient at bedside to complete discharge planning assessment.  Patient alert and oriented xs 4.  Patient verified all demographic information on facesheet is correct.  Patient verified PCP is dr. Berg.  Patient verified primary health insurance is BCBS.  Patient with NO home health or DME.  Patient with NO POA or Living Will.  Patient not on dialysis or medication coumadin.  Patient with no 30 day admission.  Patient with no financial issues at this time.  Patient family will provide transportation upon discharge from facility.  Patient independent with ADLs, live with spouse, spouse drives.      Initial Assessment (most recent)       Adult Discharge Assessment -  09/17/23 1521          Discharge Assessment    Assessment Type Discharge Planning Assessment     Confirmed/corrected address, phone number and insurance Yes     Confirmed Demographics Correct on Facesheet     Source of Information patient     Communicated CHRISTIANNE with patient/caregiver Date not available/Unable to determine     People in Home spouse     Facility Arrived From: home     Do you expect to return to your current living situation? Yes     Do you have help at home or someone to help you manage your care at home? Yes     Who are your caregiver(s) and their phone number(s)? spouse     Prior to hospitilization cognitive status: Alert/Oriented     Current cognitive status: Alert/Oriented     Equipment Currently Used at Home none     Readmission within 30 days? No     Patient currently being followed by outpatient case management? No     Do you currently have service(s) that help you manage your care at home? No     Do you take prescription medications? Yes     Do you have prescription coverage? Yes     Do you have any problems affording any of your prescribed medications? No     Is the patient taking medications as prescribed? yes     Who is going to help you get home at discharge? spouse     How do you get to doctors appointments? family or friend will provide     Are you on dialysis? No     Do you take coumadin? No     DME Needed Upon Discharge  none     Discharge Plan discussed with: Patient     Transition of Care Barriers None     Discharge Plan A Home;Home with family     Discharge Plan B Home Health        Physical Activity    On average, how many days per week do you engage in moderate to strenuous exercise (like a brisk walk)? 2 days     On average, how many minutes do you engage in exercise at this level? 60 min        Financial Resource Strain    How hard is it for you to pay for the very basics like food, housing, medical care, and heating? Not hard at all        Housing Stability    In the last 12  months, was there a time when you were not able to pay the mortgage or rent on time? No     In the last 12 months, was there a time when you did not have a steady place to sleep or slept in a shelter (including now)? No        Transportation Needs    In the past 12 months, has lack of transportation kept you from medical appointments or from getting medications? No     In the past 12 months, has lack of transportation kept you from meetings, work, or from getting things needed for daily living? No        Food Insecurity    Within the past 12 months, you worried that your food would run out before you got the money to buy more. Never true     Within the past 12 months, the food you bought just didn't last and you didn't have money to get more. Never true        Stress    Do you feel stress - tense, restless, nervous, or anxious, or unable to sleep at night because your mind is troubled all the time - these days? Not at all        Social Connections    In a typical week, how many times do you talk on the phone with family, friends, or neighbors? More than three times a week     How often do you get together with friends or relatives? More than three times a week     How often do you attend Yazdanism or Mu-ism services? Never     Do you belong to any clubs or organizations such as Yazdanism groups, unions, fraternal or athletic groups, or school groups? No     How often do you attend meetings of the clubs or organizations you belong to? Never     Are you , , , , never , or living with a partner?         Alcohol Use    Q1: How often do you have a drink containing alcohol? Patient refused     Q2: How many drinks containing alcohol do you have on a typical day when you are drinking? Patient refused     Q3: How often do you have six or more drinks on one occasion? Patient refused

## 2023-09-17 NOTE — PLAN OF CARE
Problem: Fall Injury Risk  Goal: Absence of Fall and Fall-Related Injury  Outcome: Ongoing, Progressing     Problem: Adult Inpatient Plan of Care  Goal: Plan of Care Review  Outcome: Ongoing, Progressing  Goal: Patient-Specific Goal (Individualized)  Outcome: Ongoing, Progressing  Goal: Absence of Hospital-Acquired Illness or Injury  Outcome: Ongoing, Progressing  Goal: Optimal Comfort and Wellbeing  Outcome: Ongoing, Progressing  Goal: Readiness for Transition of Care  Outcome: Ongoing, Progressing     Problem: Diabetic Ketoacidosis  Goal: Fluid and Electrolyte Balance with Absence of Ketosis  Outcome: Ongoing, Progressing     Problem: Diabetes Comorbidity  Goal: Blood Glucose Level Within Targeted Range  Outcome: Ongoing, Progressing     Problem: Fluid and Electrolyte Imbalance (Acute Kidney Injury/Impairment)  Goal: Fluid and Electrolyte Balance  Outcome: Ongoing, Progressing     Problem: Oral Intake Inadequate (Acute Kidney Injury/Impairment)  Goal: Optimal Nutrition Intake  Outcome: Ongoing, Progressing     Problem: Renal Function Impairment (Acute Kidney Injury/Impairment)  Goal: Effective Renal Function  Outcome: Ongoing, Progressing     Problem: Skin Injury Risk Increased  Goal: Skin Health and Integrity  Outcome: Ongoing, Progressing    Pt AAOx4. Lethargic but responds to voice.  Routine meds administered. Blood glucose closely monitored. Bed locked and in lowest position. Call light within reach. Telesitter at bedside. Family at bedside. Questions and concerns addressed. Monitoring ongoing.

## 2023-09-17 NOTE — NURSING
Nurses Note -- 4 Eyes      9/16/2023   7:26 PM      Skin assessed during: Q Shift Change      [x] No Altered Skin Integrity Present    [x]Prevention Measures Documented      [] Yes- Altered Skin Integrity Present or Discovered   [] LDA Added if Not in Epic (Describe Wound)   [] New Altered Skin Integrity was Present on Admit and Documented in LDA   [] Wound Image Taken    Wound Care Consulted? No    Attending Nurse:  Althea Herrera RN/Staff Member:   Sharee

## 2023-09-17 NOTE — NURSING
"Patient swinging legs out the bed. Pulling at multiple IV lines. Patient stating, "I want out of this bed." Educated patient that she is a high fall risk due to confusion and weakness. Patient yelling and cursing at staff. Patient stated, "I will punch you, if you touch me." Patient kicking staff while staff attempting to put patient back into bed. Notified MD regarding patient behavior. See new orders.   "

## 2023-09-17 NOTE — PLAN OF CARE
Problem: Fall Injury Risk  Goal: Absence of Fall and Fall-Related Injury  Outcome: Ongoing, Progressing     Problem: Adult Inpatient Plan of Care  Goal: Plan of Care Review  Outcome: Ongoing, Progressing  Goal: Patient-Specific Goal (Individualized)  Outcome: Ongoing, Progressing  Goal: Absence of Hospital-Acquired Illness or Injury  Outcome: Ongoing, Progressing  Goal: Optimal Comfort and Wellbeing  Outcome: Ongoing, Progressing  Goal: Readiness for Transition of Care  Outcome: Ongoing, Progressing       Patient awake during shift, patient is lethargic and confused. Patient agitated during shift. No reported nausea, drinking with no problems swallowing. Educated patient on fall risk and blood sugar monitoring.

## 2023-09-17 NOTE — ASSESSMENT & PLAN NOTE
-CT A/P concerning for diverticulitis w/ perforation  - Colorectal consulted, surgery not indicated   - Started on IV Cipro/Flagyl  For 4 days course  - advancing diet as tolerated  - outpatient colonoscopy

## 2023-09-17 NOTE — PLAN OF CARE
SW attempted to meet with patient at bedside to complete discharge planning assessment without success.  Patient sleeping and would not answer to name being called.       09/16/23 9429   Discharge Assessment   Assessment Type Discharge Planning Assessment

## 2023-09-17 NOTE — ASSESSMENT & PLAN NOTE
Pt has hx diastolic heart failure,  on admission, CXR with evidence of possible early congestion. Exam without evidence of volume overload, however pt is requiring 3L NC to maintain O2 sats >95%.     Plan  - TTE pending  - Pt on home metoprolol, lasix, losartan-HCTZ per recent dispense report  - Lasix or spironolactone prn

## 2023-09-17 NOTE — SUBJECTIVE & OBJECTIVE
Subjective:     Interval History: NAEO  Patient with nontender abdomen this AM, more responsive and able to have conversation. Denies abdominal pain but reports constipation 2 days prior to presentation.     Post-Op Info:  * No surgery found *          Medications:  Continuous Infusions:   sodium chloride 0.9% 125 mL/hr (09/16/23 1918)    dextrose 5 % and 0.45 % NaCl      dextrose 5 % and 0.45 % NaCl      insulin regular 1 units/mL infusion orderable (DKA) 0.01 Units/kg/hr (09/17/23 1006)     Scheduled Meds:   albuterol  2 puff Inhalation Daily    ciprofloxacin  400 mg Intravenous Q8H    famotidine (PF)  20 mg Intravenous Q12H    heparin (porcine)  5,000 Units Subcutaneous Q8H    levothyroxine  50 mcg Oral Before breakfast    losartan-hydrochlorothiazide 100-25 mg  1 tablet Oral Daily    metoprolol succinate  100 mg Oral Daily    metronidazole  500 mg Intravenous Q8H    sodium chloride 0.9%  1,000 mL Intravenous Once     PRN Meds:   acetaminophen    dextrose 10%    dextrose 10%    dextrose 5 % and 0.45 % NaCl    dextrose 5 % and 0.45 % NaCl    glucagon (human recombinant)    hydrALAZINE    melatonin    OLANZapine    ondansetron    polyethylene glycol    prochlorperazine    sodium chloride 0.9%    sodium chloride 0.9%        Objective:     Vital Signs (Most Recent):  Temp: 97.7 °F (36.5 °C) (09/17/23 0816)  Pulse: 61 (09/17/23 0947)  Resp: 18 (09/17/23 0734)  BP: (!) 224/99 (09/17/23 0816)  SpO2: 99 % (09/17/23 0900) Vital Signs (24h Range):  Temp:  [97.7 °F (36.5 °C)-98.7 °F (37.1 °C)] 97.7 °F (36.5 °C)  Pulse:  [58-90] 61  Resp:  [17-18] 18  SpO2:  [93 %-99 %] 99 %  BP: (173-224)/(74-99) 224/99     Intake/Output - Last 3 Shifts         09/15 0700  09/16 0659 09/16 0700  09/17 0659 09/17 0700  09/18 0659    P.O.  480     I.V. (mL/kg) 431.3 (4.7)      IV Piggyback 2350      Total Intake(mL/kg) 2781.3 (30.4) 480 (5.3)     Urine (mL/kg/hr) 1550 2150 (1) 850 (2.8)    Stool  0     Total Output 1550 2150 850    Net  +1231.3 -1670 -850           Stool Occurrence  0 x              Physical Exam  Constitutional:       General: She is not in acute distress.     Appearance: She is not toxic-appearing.      Comments: Lethargic yet responsive to touch and voice   Cardiovascular:      Rate and Rhythm: Normal rate.   Pulmonary:      Effort: Pulmonary effort is normal. No respiratory distress.   Abdominal:      General: There is no distension.      Palpations: Abdomen is soft.      Tenderness: There is no abdominal tenderness.   Skin:     General: Skin is warm and dry.   Neurological:      Mental Status: She is alert.            Significant Labs:  BMP:   Recent Labs   Lab 09/17/23  0928   *  291*     139   K 3.0*  3.0*     106   CO2 20*  20*   BUN 11  11   CREATININE 0.7  0.7   CALCIUM 7.9*  7.9*   MG 1.7       CBC:   Recent Labs   Lab 09/17/23  0928   WBC 8.43   RBC 4.62   HGB 12.0   HCT 39.8      MCV 86   MCH 26.0*   MCHC 30.2*       CRP:   Recent Labs   Lab 09/16/23  0325   CRP 4.5         Significant Diagnostics:  I have reviewed all pertinent imaging results/findings within the past 24 hours.

## 2023-09-17 NOTE — PROGRESS NOTES
[facility] - Telemetry [facility]  Progress Note    Patient Name: Olga Lopez  MRN: [id number]  Patient Class: IP- Inpatient   Admission Date: 9/15/2023  Length of Stay: 1 days  Attending Physician: Courtney Canas*  Primary Care Provider: Jonh Berg MD        Subjective:     Principal Problem:DKA (diabetic ketoacidosis)        HPI:  57F with PMHx of HTN, HLD, GERD, COPD presented to the ED with elevated BG and AMS. Pt currently prescribed metformin, but has not recently taken it. On admission to the ED pt denied having HA or CP Any further history is limited by pt mental status.    ED course: afebrile, HDS. Initial labs K 4.7, Glucose 1130, bicarb 26, AG 18,  BHB 1.1, Ph 7.36, Cr 2.1, Lactate 2.7, Ca 11, AST 66, , Lipase 77. , troponin 0.085. EKG NSR @ 64 bpm with LVH associated changes. Patient received 1L NS bolus, aspirin 325 mg, KCL oral and IV, empiric vancomycin and zosyn. She was started on insulin bolus and infusion. Repeat glucose improved to 580., however, K dropped to 2.9 and insulin gtt was held while K was replaced. Pt continued on insulin gtt at 0.1u/kg/hr and now at 460. Bicarb 26 on arrival, downtrended to 14. Lactate cleared. Additionally, CTAP positive for suspected localized free intraperitoneal air adjacent to the distal descending colon. Evaluated by CRS, no emergent surgical intervention, started on cipro/flagyl.       Overview/Hospital Course:  No notes on file    Interval History: Patient agitated overnight, pulling at lines, required restraints. This morning more coherent, oriented to person, place, situation, answering questions appropriately but lethargic. Able to transition off insulin gtt today to diabetic diet, insulin detemir 12 units BID, 6 units aspart TIDWM and ldssi. Able to restart PO medications - metoprolol, lasix and losartan-HCTZ.    Review of Systems   Constitutional:  Negative for chills and fever.   Respiratory:   Negative for shortness of breath.    Cardiovascular:  Negative for chest pain and leg swelling.   Gastrointestinal:  Negative for abdominal pain, nausea and vomiting.   Musculoskeletal:  Negative for arthralgias and myalgias.   Neurological:  Positive for headaches. Negative for light-headedness.   Psychiatric/Behavioral:  Negative for agitation and confusion.      Objective:     Vital Signs (Most Recent):  Temp: 98 °F (36.7 °C) (09/17/23 1055)  Pulse: 83 (09/17/23 1437)  Resp: 17 (09/17/23 1055)  BP: (!) 190/82 (09/17/23 1055)  SpO2: 95 % (09/17/23 1300) Vital Signs (24h Range):  Temp:  [97.7 °F (36.5 °C)-98.7 °F (37.1 °C)] 98 °F (36.7 °C)  Pulse:  [54-90] 83  Resp:  [17-18] 17  SpO2:  [93 %-99 %] 95 %  BP: (173-224)/(74-99) 190/82     Weight: 91.4 kg (201 lb 8 oz)  Body mass index is 38.07 kg/m².    Intake/Output Summary (Last 24 hours) at 9/17/2023 1440  Last data filed at 9/17/2023 1423  Gross per 24 hour   Intake 480 ml   Output 2700 ml   Net -2220 ml         Physical Exam  Vitals reviewed.   Constitutional:       General: She is not in acute distress.     Comments: Resting comfortable, arousable to voice. Answering questions appropriately.   HENT:      Head: Normocephalic and atraumatic.   Eyes:      Extraocular Movements: Extraocular movements intact.      Pupils: Pupils are equal, round, and reactive to light.   Cardiovascular:      Rate and Rhythm: Normal rate and regular rhythm.   Pulmonary:      Effort: Pulmonary effort is normal.      Breath sounds: Normal breath sounds. No rales.   Abdominal:      General: Bowel sounds are normal.      Palpations: Abdomen is soft.      Tenderness: There is no abdominal tenderness.   Musculoskeletal:         General: Swelling (right hand) present.      Right lower leg: No edema.      Left lower leg: No edema.   Skin:     General: Skin is warm and dry.   Neurological:      General: No focal deficit present.      Mental Status: She is alert.             Significant Labs:  All pertinent labs within the past 24 hours have been reviewed.    Significant Imaging: I have reviewed all pertinent imaging results/findings within the past 24 hours.      Assessment/Plan:      * DKA (diabetic ketoacidosis)  Pt with AMS, slightly improving  Blood sugar on arrival 1130 with a bicarb 26, anion gap 18, BHB 1.1.  HbA1c pending  Likely induced by medication noncompliance, abd infection/possible perforated diverticula  Home DM regimen:  metformin 500qd    Plan:  - DKA/HHS Pathway initiated  - Start insulin gtt per protocol with q1h accuchecks while on the drip  - Once Bicarb >18, Anion gap <12, Glucose <200 on 2 readings and able to tolerate PO intake without nausea and vomiting, transition to subq insulin with a 1-2 hr overlap with drip  · Long acting insulin dose:  Detemir (0.25mg/kg) daily or in 2 doses  · Short acting insulin dose:  Aspart (0.08mg/kg) units per meal  - Start normal saline at 125cc/hr.  Once blood sugar is 200, change IVF to D5 1/2 NS at 50cc/hr  - Monitor electrolytes with BMP q4h while on insulin gtt and place on telemetry  - Bariatric clear liquid diet while on insulin gtt then change to diabetic diet when initiating subq insulin with accuchecks 4x daily before meals and at bedtime (AC and HS)  - stopped insulin gtt today  - Determir 12 BID started, with aspart 6 units TIDWM and ldsssi  - Diabetic diet      Diverticulitis  -CT A/P concerning for diverticulitis w/ perforation  - Colorectal consulted, surgery not indicated   - Started on IV Cipro/Flagyl  For 4 days course  - advancing diet as tolerated  - outpatient colonoscopy      ROBERTO (acute kidney injury)  Creatinine 2.1 on admit, baseline around 0.8  - Likely pre-renal from dehydration and volume losses  - improved with fluid resuscitation  Plan:     - Check urine lytes  - Check urine protein creatinine ratio  - Strict I&Os and daily weights   - Avoid nephrotoxic agents such as NSAIDs, gadolinium and IV radiocontrast  - Renally  dose meds to current GFR  - Maintain MAP > 65      Elevated brain natriuretic peptide (BNP) level  Pt has hx diastolic heart failure,  on admission, CXR with evidence of possible early congestion. Exam without evidence of volume overload, however pt is requiring 3L NC to maintain O2 sats >95%.     Plan  - TTE pending  - Pt on home metoprolol, lasix, losartan-HCTZ per recent dispense report  - Lasix or spironolactone prn      COPD (chronic obstructive pulmonary disease)  - pCO2 elevated to 55 on arrival with hx of COPD. No wheezing on exam, breath sounds nl, CXR without evidence of hyperinflation/air trapping    Plan  - duonebs prn  -restarted inhaler tx daily     GERD (gastroesophageal reflux disease)  IV PPI while NPO      Hyperlipidemia  Continue home statin when no longer NPO      Postoperative hypothyroidism  Continuing home levothyroxine 50 mcg daily      HTN (hypertension)  Recent dispense report shows most recent BP meds include metoprolol- mg, losartan-HCTZ 100-25 mg, lasix 40 mg daily    - patient more alert today, able to tolerate PO intake  -restarting home anti-hypertensives       VTE Risk Mitigation (From admission, onward)         Ordered     heparin (porcine) injection 5,000 Units  Every 8 hours (non-standard times)         09/16/23 0312     IP VTE HIGH RISK PATIENT  Once         09/16/23 0312     Place KADI hose  Until discontinued         09/16/23 0312     Place sequential compression device  Until discontinued         09/16/23 0312                Discharge Planning   CHRISTIANNE: 9/18/2023     Code Status: Full Code   Is the patient medically ready for discharge?: No    Reason for patient still in hospital (select all that apply): Patient trending condition and Treatment                     Shanita Chen MD  Department of Hospital Medicine   [facility] - Telemetry Stepdown

## 2023-09-17 NOTE — ASSESSMENT & PLAN NOTE
Olga Lopez is a 57 y.o. female with a PMHx of HTN, HLD, T2DM, GERD, and COPD. She [place] [facility] [place] with altered mental status and shortness of breath. ED workup significant for hyperglycemia with DKA. Altered mental status has  Resolved, patient denies abdominal pain. Abdominal exam benign.      - Would continue antibiotics until 4 day course is complete  - OK to advance diet as tolerated  - Will set up for outpatient colonoscopy  - Please call Colorectal Surgery with questions or issues    Chato Levine MD  Colorectal Surgery

## 2023-09-17 NOTE — PROGRESS NOTES
[facility] - Telemetry Stepdown  Colorectal Surgery  Progress Note    Patient Name: Olga Lopez  MRN: [id number]  Admission Date: 9/15/2023  Hospital Length of Stay: 1 days  Attending Physician: Courtney Canas*    Subjective:     Interval History: NAEO  Patient with nontender abdomen this AM, more responsive and able to have conversation. Denies abdominal pain but reports constipation 2 days prior to presentation.     Post-Op Info:  * No surgery found *          Medications:  Continuous Infusions:   sodium chloride 0.9% 125 mL/hr (09/16/23 1918)    dextrose 5 % and 0.45 % NaCl      dextrose 5 % and 0.45 % NaCl      insulin regular 1 units/mL infusion orderable (DKA) 0.01 Units/kg/hr (09/17/23 1006)     Scheduled Meds:   albuterol  2 puff Inhalation Daily    ciprofloxacin  400 mg Intravenous Q8H    famotidine (PF)  20 mg Intravenous Q12H    heparin (porcine)  5,000 Units Subcutaneous Q8H    levothyroxine  50 mcg Oral Before breakfast    losartan-hydrochlorothiazide 100-25 mg  1 tablet Oral Daily    metoprolol succinate  100 mg Oral Daily    metronidazole  500 mg Intravenous Q8H    sodium chloride 0.9%  1,000 mL Intravenous Once     PRN Meds:   acetaminophen    dextrose 10%    dextrose 10%    dextrose 5 % and 0.45 % NaCl    dextrose 5 % and 0.45 % NaCl    glucagon (human recombinant)    hydrALAZINE    melatonin    OLANZapine    ondansetron    polyethylene glycol    prochlorperazine    sodium chloride 0.9%    sodium chloride 0.9%        Objective:     Vital Signs (Most Recent):  Temp: 97.7 °F (36.5 °C) (09/17/23 0816)  Pulse: 61 (09/17/23 0947)  Resp: 18 (09/17/23 0734)  BP: (!) 224/99 (09/17/23 0816)  SpO2: 99 % (09/17/23 0900) Vital Signs (24h Range):  Temp:  [97.7 °F (36.5 °C)-98.7 °F (37.1 °C)] 97.7 °F (36.5 °C)  Pulse:  [58-90] 61  Resp:  [17-18] 18  SpO2:  [93 %-99 %] 99 %  BP: (173-224)/(74-99) 224/99     Intake/Output - Last 3 Shifts         09/15 0700  09/16 0601  09/16 0700  09/17 0659 09/17 0700  09/18 0659    P.O.  480     I.V. (mL/kg) 431.3 (4.7)      IV Piggyback 2350      Total Intake(mL/kg) 2781.3 (30.4) 480 (5.3)     Urine (mL/kg/hr) 1550 2150 (1) 850 (2.8)    Stool  0     Total Output 1550 2150 850    Net +1231.3 -1670 -850           Stool Occurrence  0 x              Physical Exam  Constitutional:       General: She is not in acute distress.     Appearance: She is not toxic-appearing.      Comments: Lethargic yet responsive to touch and voice   Cardiovascular:      Rate and Rhythm: Normal rate.   Pulmonary:      Effort: Pulmonary effort is normal. No respiratory distress.   Abdominal:      General: There is no distension.      Palpations: Abdomen is soft.      Tenderness: There is no abdominal tenderness.   Skin:     General: Skin is warm and dry.   Neurological:      Mental Status: She is alert.            Significant Labs:  BMP:   Recent Labs   Lab 09/17/23  0928   *  291*     139   K 3.0*  3.0*     106   CO2 20*  20*   BUN 11  11   CREATININE 0.7  0.7   CALCIUM 7.9*  7.9*   MG 1.7       CBC:   Recent Labs   Lab 09/17/23  0928   WBC 8.43   RBC 4.62   HGB 12.0   HCT 39.8      MCV 86   MCH 26.0*   MCHC 30.2*       CRP:   Recent Labs   Lab 09/16/23  0325   CRP 4.5         Significant Diagnostics:  I have reviewed all pertinent imaging results/findings within the past 24 hours.    Assessment/Plan:     Diverticulitis  Olga Lopez is a 57 y.o. female with a PMHx of HTN, HLD, T2DM, GERD, and COPD. She presents to the [facility] ED 9/16 with altered mental status and shortness of breath. ED workup significant for hyperglycemia with DKA. Altered mental status has  Resolved, patient denies abdominal pain. Abdominal exam benign.      - Would continue antibiotics until 4 day course is complete  - OK to advance diet as tolerated  - Will set up for outpatient colonoscopy  - Please call Colorectal Surgery with questions or issues    Chato  MD Julianna  Colorectal Surgery          Chato Levine MD  Colorectal Surgery  [facility] - Telemetry Stepdown

## 2023-09-18 PROBLEM — R74.01 TRANSAMINITIS: Status: ACTIVE | Noted: 2023-09-18

## 2023-09-18 PROBLEM — N28.89 BILATERAL RENAL MASSES: Status: ACTIVE | Noted: 2023-09-18

## 2023-09-18 LAB
ALBUMIN SERPL BCP-MCNC: 2.4 G/DL (ref 3.5–5.2)
ALP SERPL-CCNC: 305 U/L (ref 55–135)
ALT SERPL W/O P-5'-P-CCNC: 118 U/L (ref 10–44)
ANION GAP SERPL CALC-SCNC: 13 MMOL/L (ref 8–16)
ANION GAP SERPL CALC-SCNC: 17 MMOL/L (ref 8–16)
ANION GAP SERPL CALC-SCNC: 17 MMOL/L (ref 8–16)
ASCENDING AORTA: 3.16 CM
AST SERPL-CCNC: 143 U/L (ref 10–40)
AV INDEX (PROSTH): 0.87
AV MEAN GRADIENT: 9 MMHG
AV PEAK GRADIENT: 17 MMHG
AV VALVE AREA BY VELOCITY RATIO: 2.82 CM²
AV VALVE AREA: 2.76 CM²
AV VELOCITY RATIO: 0.89
BASOPHILS # BLD AUTO: 0.01 K/UL (ref 0–0.2)
BASOPHILS NFR BLD: 0.1 % (ref 0–1.9)
BILIRUB SERPL-MCNC: 0.5 MG/DL (ref 0.1–1)
BSA FOR ECHO PROCEDURE: 1.98 M2
BUN SERPL-MCNC: 10 MG/DL (ref 6–20)
BUN SERPL-MCNC: 12 MG/DL (ref 6–20)
BUN SERPL-MCNC: 12 MG/DL (ref 6–20)
BUN SERPL-MCNC: 31 MG/DL (ref 6–30)
CALCIUM SERPL-MCNC: 8.1 MG/DL (ref 8.7–10.5)
CALCIUM SERPL-MCNC: 8.2 MG/DL (ref 8.7–10.5)
CALCIUM SERPL-MCNC: 8.2 MG/DL (ref 8.7–10.5)
CHLORIDE SERPL-SCNC: 103 MMOL/L (ref 95–110)
CHLORIDE SERPL-SCNC: 96 MMOL/L (ref 95–110)
CHLORIDE SERPL-SCNC: 99 MMOL/L (ref 95–110)
CHLORIDE SERPL-SCNC: 99 MMOL/L (ref 95–110)
CO2 SERPL-SCNC: 20 MMOL/L (ref 23–29)
CO2 SERPL-SCNC: 20 MMOL/L (ref 23–29)
CO2 SERPL-SCNC: 21 MMOL/L (ref 23–29)
CREAT SERPL-MCNC: 0.6 MG/DL (ref 0.5–1.4)
CREAT SERPL-MCNC: 0.7 MG/DL (ref 0.5–1.4)
CREAT SERPL-MCNC: 0.7 MG/DL (ref 0.5–1.4)
CREAT SERPL-MCNC: 1.3 MG/DL (ref 0.5–1.4)
CV ECHO LV RWT: 0.67 CM
DIFFERENTIAL METHOD: ABNORMAL
DOP CALC AO PEAK VEL: 2.08 M/S
DOP CALC AO VTI: 33.21 CM
DOP CALC LVOT AREA: 3.2 CM2
DOP CALC LVOT DIAMETER: 2.01 CM
DOP CALC LVOT PEAK VEL: 1.85 M/S
DOP CALC LVOT STROKE VOLUME: 91.75 CM3
DOP CALCLVOT PEAK VEL VTI: 28.93 CM
E WAVE DECELERATION TIME: 228.82 MSEC
E/A RATIO: 0.77
E/E' RATIO: 18.25 M/S
ECHO LV POSTERIOR WALL: 1.31 CM (ref 0.6–1.1)
EOSINOPHIL # BLD AUTO: 0 K/UL (ref 0–0.5)
EOSINOPHIL NFR BLD: 0.6 % (ref 0–8)
ERYTHROCYTE [DISTWIDTH] IN BLOOD BY AUTOMATED COUNT: 14.8 % (ref 11.5–14.5)
EST. GFR  (NO RACE VARIABLE): >60 ML/MIN/1.73 M^2
FRACTIONAL SHORTENING: 28 % (ref 28–44)
GLUCOSE SERPL-MCNC: 266 MG/DL (ref 70–110)
GLUCOSE SERPL-MCNC: 308 MG/DL (ref 70–110)
GLUCOSE SERPL-MCNC: 308 MG/DL (ref 70–110)
GLUCOSE SERPL-MCNC: >700 MG/DL (ref 70–110)
HCT VFR BLD AUTO: 39.9 % (ref 37–48.5)
HCT VFR BLD CALC: 54 %PCV (ref 36–54)
HGB BLD-MCNC: 12.2 G/DL (ref 12–16)
IMM GRANULOCYTES # BLD AUTO: 0.04 K/UL (ref 0–0.04)
IMM GRANULOCYTES NFR BLD AUTO: 0.6 % (ref 0–0.5)
INTERVENTRICULAR SEPTUM: 1.3 CM (ref 0.6–1.1)
IVRT: 137.01 MSEC
LA MAJOR: 5.52 CM
LA MINOR: 5.67 CM
LA WIDTH: 3.56 CM
LEFT ATRIUM SIZE: 2.47 CM
LEFT ATRIUM VOLUME INDEX MOD: 18.8 ML/M2
LEFT ATRIUM VOLUME INDEX: 22.1 ML/M2
LEFT ATRIUM VOLUME MOD: 35.61 CM3
LEFT ATRIUM VOLUME: 41.81 CM3
LEFT INTERNAL DIMENSION IN SYSTOLE: 2.83 CM (ref 2.1–4)
LEFT VENTRICLE DIASTOLIC VOLUME INDEX: 35.25 ML/M2
LEFT VENTRICLE DIASTOLIC VOLUME: 66.62 ML
LEFT VENTRICLE MASS INDEX: 96 G/M2
LEFT VENTRICLE SYSTOLIC VOLUME INDEX: 16.1 ML/M2
LEFT VENTRICLE SYSTOLIC VOLUME: 30.4 ML
LEFT VENTRICULAR INTERNAL DIMENSION IN DIASTOLE: 3.92 CM (ref 3.5–6)
LEFT VENTRICULAR MASS: 182.15 G
LV LATERAL E/E' RATIO: 18.25 M/S
LV SEPTAL E/E' RATIO: 18.25 M/S
LYMPHOCYTES # BLD AUTO: 1.3 K/UL (ref 1–4.8)
LYMPHOCYTES NFR BLD: 18.1 % (ref 18–48)
MAGNESIUM SERPL-MCNC: 1.8 MG/DL (ref 1.6–2.6)
MCH RBC QN AUTO: 26.5 PG (ref 27–31)
MCHC RBC AUTO-ENTMCNC: 30.6 G/DL (ref 32–36)
MCV RBC AUTO: 87 FL (ref 82–98)
MONOCYTES # BLD AUTO: 0.4 K/UL (ref 0.3–1)
MONOCYTES NFR BLD: 5.9 % (ref 4–15)
MV PEAK A VEL: 0.95 M/S
MV PEAK E VEL: 0.73 M/S
MV STENOSIS PRESSURE HALF TIME: 66.36 MS
MV VALVE AREA P 1/2 METHOD: 3.32 CM2
NEUTROPHILS # BLD AUTO: 5.3 K/UL (ref 1.8–7.7)
NEUTROPHILS NFR BLD: 74.7 % (ref 38–73)
NRBC BLD-RTO: 0 /100 WBC
PHOSPHATE SERPL-MCNC: 1.4 MG/DL (ref 2.7–4.5)
PISA TR MAX VEL: 2.99 M/S
PLATELET # BLD AUTO: 167 K/UL (ref 150–450)
PMV BLD AUTO: 13.1 FL (ref 9.2–12.9)
POC IONIZED CALCIUM: 1.39 MMOL/L (ref 1.06–1.42)
POC TCO2 (MEASURED): 33 MMOL/L (ref 23–29)
POCT GLUCOSE: 226 MG/DL (ref 70–110)
POCT GLUCOSE: 245 MG/DL (ref 70–110)
POCT GLUCOSE: 285 MG/DL (ref 70–110)
POCT GLUCOSE: 312 MG/DL (ref 70–110)
POCT GLUCOSE: 312 MG/DL (ref 70–110)
POTASSIUM BLD-SCNC: 4.9 MMOL/L (ref 3.5–5.1)
POTASSIUM SERPL-SCNC: 3.6 MMOL/L (ref 3.5–5.1)
PROT SERPL-MCNC: 6.1 G/DL (ref 6–8.4)
RA MAJOR: 4.91 CM
RA PRESSURE ESTIMATED: 3 MMHG
RA WIDTH: 3.13 CM
RBC # BLD AUTO: 4.61 M/UL (ref 4–5.4)
RV TB RVSP: 6 MMHG
SAMPLE: ABNORMAL
SINUS: 3.18 CM
SODIUM BLD-SCNC: 137 MMOL/L (ref 136–145)
SODIUM SERPL-SCNC: 136 MMOL/L (ref 136–145)
SODIUM SERPL-SCNC: 136 MMOL/L (ref 136–145)
SODIUM SERPL-SCNC: 137 MMOL/L (ref 136–145)
STJ: 2.32 CM
TDI LATERAL: 0.04 M/S
TDI SEPTAL: 0.04 M/S
TDI: 0.04 M/S
TR MAX PG: 36 MMHG
TRICUSPID ANNULAR PLANE SYSTOLIC EXCURSION: 2.17 CM
TV REST PULMONARY ARTERY PRESSURE: 39 MMHG
WBC # BLD AUTO: 7.07 K/UL (ref 3.9–12.7)
Z-SCORE OF LEFT VENTRICULAR DIMENSION IN END DIASTOLE: -3
Z-SCORE OF LEFT VENTRICULAR DIMENSION IN END SYSTOLE: -1.12

## 2023-09-18 PROCEDURE — 80048 BASIC METABOLIC PNL TOTAL CA: CPT | Mod: XB

## 2023-09-18 PROCEDURE — 94761 N-INVAS EAR/PLS OXIMETRY MLT: CPT

## 2023-09-18 PROCEDURE — 94640 AIRWAY INHALATION TREATMENT: CPT

## 2023-09-18 PROCEDURE — 25000003 PHARM REV CODE 250

## 2023-09-18 PROCEDURE — 84100 ASSAY OF PHOSPHORUS: CPT

## 2023-09-18 PROCEDURE — 83735 ASSAY OF MAGNESIUM: CPT

## 2023-09-18 PROCEDURE — 99232 PR SUBSEQUENT HOSPITAL CARE,LEVL II: ICD-10-PCS | Mod: ,,, | Performed by: HOSPITALIST

## 2023-09-18 PROCEDURE — 63600175 PHARM REV CODE 636 W HCPCS

## 2023-09-18 PROCEDURE — 63600175 PHARM REV CODE 636 W HCPCS: Performed by: STUDENT IN AN ORGANIZED HEALTH CARE EDUCATION/TRAINING PROGRAM

## 2023-09-18 PROCEDURE — 36415 COLL VENOUS BLD VENIPUNCTURE: CPT

## 2023-09-18 PROCEDURE — 80053 COMPREHEN METABOLIC PANEL: CPT

## 2023-09-18 PROCEDURE — 20600001 HC STEP DOWN PRIVATE ROOM

## 2023-09-18 PROCEDURE — 99232 SBSQ HOSP IP/OBS MODERATE 35: CPT | Mod: ,,, | Performed by: HOSPITALIST

## 2023-09-18 PROCEDURE — 85025 COMPLETE CBC W/AUTO DIFF WBC: CPT

## 2023-09-18 RX ORDER — METRONIDAZOLE 500 MG/1
500 TABLET ORAL EVERY 8 HOURS
Status: DISCONTINUED | OUTPATIENT
Start: 2023-09-18 | End: 2023-09-19

## 2023-09-18 RX ORDER — INSULIN ASPART 100 [IU]/ML
9 INJECTION, SOLUTION INTRAVENOUS; SUBCUTANEOUS
Status: DISCONTINUED | OUTPATIENT
Start: 2023-09-18 | End: 2023-09-19

## 2023-09-18 RX ORDER — ATORVASTATIN CALCIUM 40 MG/1
40 TABLET, FILM COATED ORAL DAILY
Status: DISCONTINUED | OUTPATIENT
Start: 2023-09-18 | End: 2023-09-18

## 2023-09-18 RX ORDER — FAMOTIDINE 20 MG/1
20 TABLET, FILM COATED ORAL 2 TIMES DAILY
Status: DISCONTINUED | OUTPATIENT
Start: 2023-09-18 | End: 2023-09-22 | Stop reason: HOSPADM

## 2023-09-18 RX ORDER — ENOXAPARIN SODIUM 100 MG/ML
40 INJECTION SUBCUTANEOUS EVERY 24 HOURS
Status: DISCONTINUED | OUTPATIENT
Start: 2023-09-18 | End: 2023-09-22 | Stop reason: HOSPADM

## 2023-09-18 RX ORDER — SODIUM,POTASSIUM PHOSPHATES 280-250MG
2 POWDER IN PACKET (EA) ORAL
Status: COMPLETED | OUTPATIENT
Start: 2023-09-18 | End: 2023-09-18

## 2023-09-18 RX ORDER — CIPROFLOXACIN 500 MG/1
500 TABLET ORAL EVERY 12 HOURS
Status: DISCONTINUED | OUTPATIENT
Start: 2023-09-18 | End: 2023-09-19

## 2023-09-18 RX ADMIN — ACETAMINOPHEN 650 MG: 325 TABLET ORAL at 03:09

## 2023-09-18 RX ADMIN — HEPARIN SODIUM 5000 UNITS: 5000 INJECTION INTRAVENOUS; SUBCUTANEOUS at 04:09

## 2023-09-18 RX ADMIN — INSULIN ASPART 2 UNITS: 100 INJECTION, SOLUTION INTRAVENOUS; SUBCUTANEOUS at 08:09

## 2023-09-18 RX ADMIN — ALBUTEROL SULFATE 2 PUFF: 108 INHALANT RESPIRATORY (INHALATION) at 09:09

## 2023-09-18 RX ADMIN — CIPROFLOXACIN 400 MG: 400 INJECTION, SOLUTION INTRAVENOUS at 04:09

## 2023-09-18 RX ADMIN — INSULIN ASPART 6 UNITS: 100 INJECTION, SOLUTION INTRAVENOUS; SUBCUTANEOUS at 06:09

## 2023-09-18 RX ADMIN — METRONIDAZOLE 500 MG: 500 TABLET ORAL at 09:09

## 2023-09-18 RX ADMIN — POLYETHYLENE GLYCOL 3350 17 G: 17 POWDER, FOR SOLUTION ORAL at 09:09

## 2023-09-18 RX ADMIN — INSULIN DETEMIR 4 UNITS: 100 INJECTION, SOLUTION SUBCUTANEOUS at 11:09

## 2023-09-18 RX ADMIN — INSULIN DETEMIR 12 UNITS: 100 INJECTION, SOLUTION SUBCUTANEOUS at 08:09

## 2023-09-18 RX ADMIN — CIPROFLOXACIN 500 MG: 500 TABLET, FILM COATED ORAL at 09:09

## 2023-09-18 RX ADMIN — INSULIN ASPART 9 UNITS: 100 INJECTION, SOLUTION INTRAVENOUS; SUBCUTANEOUS at 05:09

## 2023-09-18 RX ADMIN — POTASSIUM & SODIUM PHOSPHATES POWDER PACK 280-160-250 MG 2 PACKET: 280-160-250 PACK at 05:09

## 2023-09-18 RX ADMIN — METRONIDAZOLE 500 MG: 500 TABLET ORAL at 05:09

## 2023-09-18 RX ADMIN — METOPROLOL SUCCINATE 100 MG: 100 TABLET, EXTENDED RELEASE ORAL at 08:09

## 2023-09-18 RX ADMIN — ENOXAPARIN SODIUM 40 MG: 40 INJECTION SUBCUTANEOUS at 05:09

## 2023-09-18 RX ADMIN — INSULIN ASPART 2 UNITS: 100 INJECTION, SOLUTION INTRAVENOUS; SUBCUTANEOUS at 11:09

## 2023-09-18 RX ADMIN — METRONIDAZOLE 500 MG: 5 INJECTION, SOLUTION INTRAVENOUS at 06:09

## 2023-09-18 RX ADMIN — INSULIN ASPART 4 UNITS: 100 INJECTION, SOLUTION INTRAVENOUS; SUBCUTANEOUS at 09:09

## 2023-09-18 RX ADMIN — POTASSIUM & SODIUM PHOSPHATES POWDER PACK 280-160-250 MG 2 PACKET: 280-160-250 PACK at 08:09

## 2023-09-18 RX ADMIN — POTASSIUM & SODIUM PHOSPHATES POWDER PACK 280-160-250 MG 2 PACKET: 280-160-250 PACK at 11:09

## 2023-09-18 RX ADMIN — FAMOTIDINE 20 MG: 20 TABLET ORAL at 09:09

## 2023-09-18 RX ADMIN — INSULIN ASPART 4 UNITS: 100 INJECTION, SOLUTION INTRAVENOUS; SUBCUTANEOUS at 05:09

## 2023-09-18 RX ADMIN — INSULIN ASPART 9 UNITS: 100 INJECTION, SOLUTION INTRAVENOUS; SUBCUTANEOUS at 11:09

## 2023-09-18 RX ADMIN — ONDANSETRON 4 MG: 2 INJECTION INTRAMUSCULAR; INTRAVENOUS at 08:09

## 2023-09-18 RX ADMIN — LEVOTHYROXINE SODIUM 50 MCG: 50 TABLET ORAL at 06:09

## 2023-09-18 RX ADMIN — LOSARTAN POTASSIUM AND HYDROCHLOROTHIAZIDE 1 TABLET: 100; 25 TABLET, FILM COATED ORAL at 08:09

## 2023-09-18 RX ADMIN — FAMOTIDINE 20 MG: 10 INJECTION, SOLUTION INTRAVENOUS at 08:09

## 2023-09-18 NOTE — NURSING
Nurses Note -- 4 Eyes      9/18/2023         Skin assessed during: Q Shift Change      [x] No Altered Skin Integrity Present    [x]Prevention Measures Documented      [] Yes- Altered Skin Integrity Present or Discovered   [] LDA Added if Not in Epic (Describe Wound)   [] New Altered Skin Integrity was Present on Admit and Documented in LDA   [] Wound Image Taken    Wound Care Consulted? No    Attending Nurse:  Larry Herrera RN/Staff Member:   Althea

## 2023-09-18 NOTE — ASSESSMENT & PLAN NOTE
Creatinine 2.1 on admit, baseline around 0.8  - Likely pre-renal from dehydration and volume losses  - improved with fluid resuscitation  Plan:     - Check urine lytes  - Check urine protein creatinine ratio  - Strict I&Os and daily weights   - Avoid nephrotoxic agents such as NSAIDs, gadolinium and IV radiocontrast  - Renally dose meds to current GFR  - Maintain MAP > 65  - pre-renal, resolved w/ fluids

## 2023-09-18 NOTE — NURSING
Notified MD regarding q1h blood sugar monitoring. Informed patient is not on insulin drip if we need to continue to check blood sugar every 1 hour. MD made aware and informed only to check blood sugar q1h if patient is on insulin drip.

## 2023-09-18 NOTE — ASSESSMENT & PLAN NOTE
Pt with AMS, slightly improving  Blood sugar on arrival 1130 with a bicarb 26, anion gap 18, BHB 1.1.  HbA1c pending  Likely induced by medication noncompliance, abd infection/possible perforated diverticula  Home DM regimen:  metformin 500qd    Plan:  - DKA/HHS Pathway initiated  - Start insulin gtt per protocol with q1h accuchecks while on the drip  - Once Bicarb >18, Anion gap <12, Glucose <200 on 2 readings and able to tolerate PO intake without nausea and vomiting, transition to subq insulin with a 1-2 hr overlap with drip  · Long acting insulin dose:  Detemir (0.25mg/kg) daily or in 2 doses  · Short acting insulin dose:  Aspart (0.08mg/kg) units per meal  - Start normal saline at 125cc/hr.  Once blood sugar is 200, change IVF to D5 1/2 NS at 50cc/hr  - Monitor electrolytes with BMP q4h while on insulin gtt and place on telemetry  - Bariatric clear liquid diet while on insulin gtt then change to diabetic diet when initiating subq insulin with accuchecks 4x daily before meals and at bedtime (AC and HS)  - stopped insulin gtt today  - Determir 16 BID, with aspart 9 units TIDWM and ldsssi  - Diabetic diet

## 2023-09-18 NOTE — PLAN OF CARE
Problem: Fall Injury Risk  Goal: Absence of Fall and Fall-Related Injury  Outcome: Ongoing, Progressing     Problem: Adult Inpatient Plan of Care  Goal: Plan of Care Review  Outcome: Ongoing, Progressing  Goal: Patient-Specific Goal (Individualized)  Outcome: Ongoing, Progressing  Goal: Absence of Hospital-Acquired Illness or Injury  Outcome: Ongoing, Progressing  Goal: Optimal Comfort and Wellbeing  Outcome: Ongoing, Progressing  Goal: Readiness for Transition of Care  Outcome: Ongoing, Progressing     Problem: Diabetic Ketoacidosis  Goal: Fluid and Electrolyte Balance with Absence of Ketosis  Outcome: Ongoing, Progressing     Problem: Diabetes Comorbidity  Goal: Blood Glucose Level Within Targeted Range  Outcome: Ongoing, Progressing     Problem: Fluid and Electrolyte Imbalance (Acute Kidney Injury/Impairment)  Goal: Fluid and Electrolyte Balance  Outcome: Ongoing, Progressing     Problem: Oral Intake Inadequate (Acute Kidney Injury/Impairment)  Goal: Optimal Nutrition Intake  Outcome: Ongoing, Progressing     Problem: Renal Function Impairment (Acute Kidney Injury/Impairment)  Goal: Effective Renal Function  Outcome: Ongoing, Progressing     Problem: Skin Injury Risk Increased  Goal: Skin Health and Integrity  Outcome: Ongoing, Progressing     Pt ambulated to bathroom during shift. Pt remained continent of bladder during shift. Demonstrated and instructed pt on how to administer SQ insulin injections to pt. Allowed pt to administer SQ insulin injection at lunch. Pt successfully administered her own SQ insulin injection. Pt remained free of falls or injuries during shift. No signs of acute distress noted during shift.

## 2023-09-18 NOTE — PROGRESS NOTES
[facility] - Telemetry [facility] Medicine  Progress Note    Patient Name: Olga Lopez  MRN: [id number]  Patient Class: IP- Inpatient   Admission Date: 9/15/2023  Length of Stay: 2 days  Attending Physician: Courtney Canas*  Primary Care Provider: Jonh Berg MD        Subjective:     Principal Problem:DKA (diabetic ketoacidosis)        HPI:  57F with PMHx of HTN, HLD, GERD, COPD presented to the ED with elevated BG and AMS. Pt currently prescribed metformin, but has not recently taken it. On admission to the ED pt denied having HA or CP Any further history is limited by pt mental status.    ED course: afebrile, HDS. Initial labs K 4.7, Glucose 1130, bicarb 26, AG 18,  BHB 1.1, Ph 7.36, Cr 2.1, Lactate 2.7, Ca 11, AST 66, , Lipase 77. , troponin 0.085. EKG NSR @ 64 bpm with LVH associated changes. Patient received 1L NS bolus, aspirin 325 mg, KCL oral and IV, empiric vancomycin and zosyn. She was started on insulin bolus and infusion. Repeat glucose improved to 580., however, K dropped to 2.9 and insulin gtt was held while K was replaced. Pt continued on insulin gtt at 0.1u/kg/hr and now at 460. Bicarb 26 on arrival, downtrended to 14. Lactate cleared. Additionally, CTAP positive for suspected localized free intraperitoneal air adjacent to the distal descending colon. Evaluated by CRS, no emergent surgical intervention, started on cipro/flagyl.       Overview/Hospital Course:  No notes on file    Interval History:   Ms. Griffith has improved significantly since I last saw her on admission. She is alert, oriented, and interactive this morning. She reports she was drinking large amounts of eduin-aid, juice, and soda prior to coming in. Her insulin regimen is still not sufficient yet. Her aspart and detemir was increased to 9 units with meals and 16 units BID. If her glucose improves tomorrow with the adjustments, she can be discharged and DC on abx to complete a 4 day course  for her perforated diverticulitis. She will have a colonoscopy outpatient with colorectal.     Review of Systems  Objective:     Vital Signs (Most Recent):  Temp: 98.5 °F (36.9 °C) (09/18/23 1104)  Pulse: 89 (09/18/23 1104)  Resp: 19 (09/18/23 1104)  BP: (!) 133/95 (09/18/23 1104)  SpO2: 96 % (09/18/23 1104) Vital Signs (24h Range):  Temp:  [97.8 °F (36.6 °C)-98.5 °F (36.9 °C)] 98.5 °F (36.9 °C)  Pulse:  [64-96] 89  Resp:  [16-21] 19  SpO2:  [94 %-100 %] 96 %  BP: (133-180)/(70-95) 133/95     Weight: 91.4 kg (201 lb 8 oz)  Body mass index is 38.07 kg/m².    Intake/Output Summary (Last 24 hours) at 9/18/2023 1317  Last data filed at 9/18/2023 0505  Gross per 24 hour   Intake 480 ml   Output 2500 ml   Net -2020 ml         Physical Exam  Constitutional:       General: She is not in acute distress.     Appearance: Normal appearance.   HENT:      Head: Normocephalic and atraumatic.   Eyes:      Extraocular Movements: Extraocular movements intact.      Pupils: Pupils are equal, round, and reactive to light.   Cardiovascular:      Rate and Rhythm: Normal rate and regular rhythm.   Pulmonary:      Effort: Pulmonary effort is normal. No respiratory distress.      Breath sounds: Normal breath sounds.   Abdominal:      General: Abdomen is flat.      Palpations: Abdomen is soft.      Tenderness: There is abdominal tenderness. There is no guarding or rebound.      Comments: Diffusely tender   Musculoskeletal:      Right lower leg: No edema.      Left lower leg: No edema.   Neurological:      General: No focal deficit present.      Mental Status: She is alert and oriented to person, place, and time.             Significant Labs: All pertinent labs within the past 24 hours have been reviewed.    Significant Imaging: I have reviewed all pertinent imaging results/findings within the past 24 hours.      Assessment/Plan:      * DKA (diabetic ketoacidosis)  Pt with AMS, slightly improving  Blood sugar on arrival 1130 with a bicarb 26,  anion gap 18, BHB 1.1.  HbA1c pending  Likely induced by medication noncompliance, abd infection/possible perforated diverticula  Home DM regimen:  metformin 500qd    Plan:  - DKA/HHS Pathway initiated  - Start insulin gtt per protocol with q1h accuchecks while on the drip  - Once Bicarb >18, Anion gap <12, Glucose <200 on 2 readings and able to tolerate PO intake without nausea and vomiting, transition to subq insulin with a 1-2 hr overlap with drip  · Long acting insulin dose:  Detemir (0.25mg/kg) daily or in 2 doses  · Short acting insulin dose:  Aspart (0.08mg/kg) units per meal  - Start normal saline at 125cc/hr.  Once blood sugar is 200, change IVF to D5 1/2 NS at 50cc/hr  - Monitor electrolytes with BMP q4h while on insulin gtt and place on telemetry  - Bariatric clear liquid diet while on insulin gtt then change to diabetic diet when initiating subq insulin with accuchecks 4x daily before meals and at bedtime (AC and HS)  - stopped insulin gtt today  - Determir 16 BID, with aspart 9 units TIDWM and ldsssi  - Diabetic diet      Transaminitis  - AST/ALT elevation  - RFs for hepatic steatosis present  - liver US ordered for eval      Diverticulitis  -CT A/P concerning for diverticulitis w/ perforation  - Colorectal consulted, surgery not indicated   - Started on IV Cipro/Flagyl  For 4 days course  - advancing diet as tolerated  - outpatient colonoscopy, DC on oral abx      ROBERTO (acute kidney injury)  Creatinine 2.1 on admit, baseline around 0.8  - Likely pre-renal from dehydration and volume losses  - improved with fluid resuscitation  Plan:     - Check urine lytes  - Check urine protein creatinine ratio  - Strict I&Os and daily weights   - Avoid nephrotoxic agents such as NSAIDs, gadolinium and IV radiocontrast  - Renally dose meds to current GFR  - Maintain MAP > 65  - pre-renal, resolved w/ fluids      Elevated brain natriuretic peptide (BNP) level  Pt has hx diastolic heart failure,  on admission, CXR  with evidence of possible early congestion. Exam without evidence of volume overload, however pt is requiring 3L NC to maintain O2 sats >95%.     Plan  - TTE pending  - Pt on home metoprolol, lasix, losartan-HCTZ per recent dispense report  - Lasix or spironolactone prn      COPD (chronic obstructive pulmonary disease)  - pCO2 elevated to 55 on arrival with hx of COPD. No wheezing on exam, breath sounds nl, CXR without evidence of hyperinflation/air trapping    Plan  - duonebs prn  -restarted inhaler tx daily     GERD (gastroesophageal reflux disease)  IV PPI while NPO      Hyperlipidemia  Continue home Atorvastatin    Postoperative hypothyroidism  Continuing home levothyroxine 50 mcg daily      HTN (hypertension)  Recent dispense report shows most recent BP meds include metoprolol- mg, losartan-HCTZ 100-25 mg, lasix 40 mg daily    - patient more alert today, able to tolerate PO intake  - restarting home anti-hypertensives   - controlled      VTE Risk Mitigation (From admission, onward)         Ordered     enoxaparin injection 40 mg  Every 24 hours         09/18/23 1050     IP VTE HIGH RISK PATIENT  Once         09/16/23 0312     Place KADI hose  Until discontinued         09/16/23 0312     Place sequential compression device  Until discontinued         09/16/23 0312                Discharge Planning   CHRISTIANNE: 9/19/2023     Code Status: Full Code   Is the patient medically ready for discharge?: No    Reason for patient still in hospital (select all that apply): Patient trending condition  Discharge Plan A: Home, Home with family                  Roz Trejo DO  Department of Hospital Medicine   [facility] - Telemetry Stepdown

## 2023-09-18 NOTE — ASSESSMENT & PLAN NOTE
Recent dispense report shows most recent BP meds include metoprolol- mg, losartan-HCTZ 100-25 mg, lasix 40 mg daily    - patient more alert today, able to tolerate PO intake  - restarting home anti-hypertensives   - controlled

## 2023-09-18 NOTE — PROGRESS NOTES
Food & Nutrition  Education    Diet Education: DM Education  Time Spent: 25 minutes  Learners: Patient/ Daughter/ Sister      Nutrition Education provided with handouts: Meal Planning Using the Plate Method, CHO Counting for People with Diabetes      Comments:  RD contacted by daughter to provide nutrition education. RD spoke with patient regarding CHO meal planning for people with diabetes. RD explained carbohydrates are grains, starchy vegetables, milk, yogurt, fruit, and sweets. Foods with carbohydrates cause your blood sugar to rise, it is important to choose healthy carbohydrates and to control the amount of carbohydrates eaten at each meal for blood sugar management. Don't skip meals. Choose fresh, unprocessed foods as often as possible. Drink water. Limit sugary beverages such as emelyn, juice and punch. Fill half your plate with non-starchy vegetables. Choose lean proteins (seafood, beef/pork loin, chicken/turkey, eggs. Eat more whole grain breads and cereals. Choose 3-4 servings of carbohydrates per meal. Appropriate portion sizes. Patient is aware of A1C >14% and what that the lab value means. Patient and family members agree to make changes to comply with diabetic diet. All questions/concerns were addressed. RD recommends outpatient diabetic management clinic referral for further education.       All questions and concerns answered. Dietitian's contact information provided.       Follow-Up: Yes    Please Re-consult as needed        Thanks!    Jayesh Washington Registration Eligible, Provisional LDN

## 2023-09-18 NOTE — PLAN OF CARE
Problem: Fall Injury Risk  Goal: Absence of Fall and Fall-Related Injury  Outcome: Ongoing, Progressing     Problem: Adult Inpatient Plan of Care  Goal: Plan of Care Review  Outcome: Ongoing, Progressing  Goal: Patient-Specific Goal (Individualized)  Outcome: Ongoing, Progressing  Goal: Absence of Hospital-Acquired Illness or Injury  Outcome: Ongoing, Progressing  Goal: Optimal Comfort and Wellbeing  Outcome: Ongoing, Progressing  Goal: Readiness for Transition of Care  Outcome: Ongoing, Progressing      Patient rested during shift, educated patient on blood sugar monitoring. No new events during shift. Informed patient to call to assistance.

## 2023-09-18 NOTE — ASSESSMENT & PLAN NOTE
-CT A/P concerning for diverticulitis w/ perforation  - Colorectal consulted, surgery not indicated   - Started on IV Cipro/Flagyl  For 4 days course  - advancing diet as tolerated  - outpatient colonoscopy, DC on oral abx

## 2023-09-18 NOTE — SUBJECTIVE & OBJECTIVE
Interval History:   Ms. Griffith has improved significantly since I last saw her on admission. She is alert, oriented, and interactive this morning. She reports she was drinking large amounts of eduin-aid, juice, and soda prior to coming in. Her insulin regimen is still not sufficient yet. Her aspart and detemir was increased to 9 units with meals and 16 units BID. If her glucose improves tomorrow with the adjustments, she can be discharged and DC on abx to complete a 4 day course for her perforated diverticulitis. She will have a colonoscopy outpatient with colorectal.     Review of Systems  Objective:     Vital Signs (Most Recent):  Temp: 98.5 °F (36.9 °C) (09/18/23 1104)  Pulse: 89 (09/18/23 1104)  Resp: 19 (09/18/23 1104)  BP: (!) 133/95 (09/18/23 1104)  SpO2: 96 % (09/18/23 1104) Vital Signs (24h Range):  Temp:  [97.8 °F (36.6 °C)-98.5 °F (36.9 °C)] 98.5 °F (36.9 °C)  Pulse:  [64-96] 89  Resp:  [16-21] 19  SpO2:  [94 %-100 %] 96 %  BP: (133-180)/(70-95) 133/95     Weight: 91.4 kg (201 lb 8 oz)  Body mass index is 38.07 kg/m².    Intake/Output Summary (Last 24 hours) at 9/18/2023 1317  Last data filed at 9/18/2023 0505  Gross per 24 hour   Intake 480 ml   Output 2500 ml   Net -2020 ml         Physical Exam  Constitutional:       General: She is not in acute distress.     Appearance: Normal appearance.   HENT:      Head: Normocephalic and atraumatic.   Eyes:      Extraocular Movements: Extraocular movements intact.      Pupils: Pupils are equal, round, and reactive to light.   Cardiovascular:      Rate and Rhythm: Normal rate and regular rhythm.   Pulmonary:      Effort: Pulmonary effort is normal. No respiratory distress.      Breath sounds: Normal breath sounds.   Abdominal:      General: Abdomen is flat.      Palpations: Abdomen is soft.      Tenderness: There is abdominal tenderness. There is no guarding or rebound.      Comments: Diffusely tender   Musculoskeletal:      Right lower leg: No edema.      Left  lower leg: No edema.   Neurological:      General: No focal deficit present.      Mental Status: She is alert and oriented to person, place, and time.             Significant Labs: All pertinent labs within the past 24 hours have been reviewed.    Significant Imaging: I have reviewed all pertinent imaging results/findings within the past 24 hours.

## 2023-09-18 NOTE — CONSULTS
Nutrition consult received regarding diabetic diet education.  Pt/pt's daughter educated on diabetic diet 9/16, please see education tab for details.    Thanks!  MS Jessica, RD, LDN

## 2023-09-18 NOTE — NURSING
Nurses Note -- 4 Eyes      9/17/2023   7:15 PM       Skin assessed during: Q Shift Change      [x] No Altered Skin Integrity Present    [x]Prevention Measures Documented      [] Yes- Altered Skin Integrity Present or Discovered   [] LDA Added if Not in Epic (Describe Wound)   [] New Altered Skin Integrity was Present on Admit and Documented in LDA   [] Wound Image Taken    Wound Care Consulted? No    Attending Nurse:  Althea Herrera RN/Staff Member:   Pete Swan

## 2023-09-19 LAB
ALBUMIN SERPL BCP-MCNC: 2.6 G/DL (ref 3.5–5.2)
ALP SERPL-CCNC: 498 U/L (ref 55–135)
ALT SERPL W/O P-5'-P-CCNC: 208 U/L (ref 10–44)
ANION GAP SERPL CALC-SCNC: 13 MMOL/L (ref 8–16)
AST SERPL-CCNC: 184 U/L (ref 10–40)
BASOPHILS # BLD AUTO: 0.02 K/UL (ref 0–0.2)
BASOPHILS NFR BLD: 0.3 % (ref 0–1.9)
BILIRUB SERPL-MCNC: 0.5 MG/DL (ref 0.1–1)
BUN SERPL-MCNC: 15 MG/DL (ref 6–20)
CALCIUM SERPL-MCNC: 9 MG/DL (ref 8.7–10.5)
CERULOPLASMIN SERPL-MCNC: 25 MG/DL (ref 15–45)
CHLORIDE SERPL-SCNC: 103 MMOL/L (ref 95–110)
CO2 SERPL-SCNC: 22 MMOL/L (ref 23–29)
CREAT SERPL-MCNC: 0.7 MG/DL (ref 0.5–1.4)
DIFFERENTIAL METHOD: ABNORMAL
EOSINOPHIL # BLD AUTO: 0.1 K/UL (ref 0–0.5)
EOSINOPHIL NFR BLD: 0.8 % (ref 0–8)
ERYTHROCYTE [DISTWIDTH] IN BLOOD BY AUTOMATED COUNT: 15 % (ref 11.5–14.5)
EST. GFR  (NO RACE VARIABLE): >60 ML/MIN/1.73 M^2
GLUCOSE SERPL-MCNC: 267 MG/DL (ref 70–110)
HAV IGM SERPL QL IA: NORMAL
HBV CORE IGM SERPL QL IA: NORMAL
HBV SURFACE AG SERPL QL IA: NORMAL
HCT VFR BLD AUTO: 42.7 % (ref 37–48.5)
HCV AB SERPL QL IA: NORMAL
HCV AB SERPL QL IA: NORMAL
HGB BLD-MCNC: 13.3 G/DL (ref 12–16)
IMM GRANULOCYTES # BLD AUTO: 0.05 K/UL (ref 0–0.04)
IMM GRANULOCYTES NFR BLD AUTO: 0.6 % (ref 0–0.5)
LYMPHOCYTES # BLD AUTO: 1.2 K/UL (ref 1–4.8)
LYMPHOCYTES NFR BLD: 15.1 % (ref 18–48)
MAGNESIUM SERPL-MCNC: 1.9 MG/DL (ref 1.6–2.6)
MCH RBC QN AUTO: 26.5 PG (ref 27–31)
MCHC RBC AUTO-ENTMCNC: 31.1 G/DL (ref 32–36)
MCV RBC AUTO: 85 FL (ref 82–98)
MONOCYTES # BLD AUTO: 0.5 K/UL (ref 0.3–1)
MONOCYTES NFR BLD: 6.1 % (ref 4–15)
NEUTROPHILS # BLD AUTO: 6 K/UL (ref 1.8–7.7)
NEUTROPHILS NFR BLD: 77.1 % (ref 38–73)
NRBC BLD-RTO: 0 /100 WBC
PHOSPHATE SERPL-MCNC: 1.8 MG/DL (ref 2.7–4.5)
PLATELET # BLD AUTO: 166 K/UL (ref 150–450)
PMV BLD AUTO: 12.3 FL (ref 9.2–12.9)
POCT GLUCOSE: 203 MG/DL (ref 70–110)
POCT GLUCOSE: 232 MG/DL (ref 70–110)
POCT GLUCOSE: 254 MG/DL (ref 70–110)
POCT GLUCOSE: 296 MG/DL (ref 70–110)
POCT GLUCOSE: 313 MG/DL (ref 70–110)
POCT GLUCOSE: 336 MG/DL (ref 70–110)
POTASSIUM SERPL-SCNC: 3.3 MMOL/L (ref 3.5–5.1)
PROT SERPL-MCNC: 6.9 G/DL (ref 6–8.4)
RBC # BLD AUTO: 5.02 M/UL (ref 4–5.4)
SODIUM SERPL-SCNC: 138 MMOL/L (ref 136–145)
WBC # BLD AUTO: 7.82 K/UL (ref 3.9–12.7)

## 2023-09-19 PROCEDURE — 86015 ACTIN ANTIBODY EACH: CPT | Performed by: HOSPITALIST

## 2023-09-19 PROCEDURE — 83735 ASSAY OF MAGNESIUM: CPT

## 2023-09-19 PROCEDURE — 63600175 PHARM REV CODE 636 W HCPCS

## 2023-09-19 PROCEDURE — 84100 ASSAY OF PHOSPHORUS: CPT

## 2023-09-19 PROCEDURE — 36415 COLL VENOUS BLD VENIPUNCTURE: CPT | Performed by: HOSPITALIST

## 2023-09-19 PROCEDURE — 20600001 HC STEP DOWN PRIVATE ROOM

## 2023-09-19 PROCEDURE — 82390 ASSAY OF CERULOPLASMIN: CPT | Performed by: HOSPITALIST

## 2023-09-19 PROCEDURE — 99900035 HC TECH TIME PER 15 MIN (STAT)

## 2023-09-19 PROCEDURE — 25000003 PHARM REV CODE 250

## 2023-09-19 PROCEDURE — 80074 ACUTE HEPATITIS PANEL: CPT | Performed by: HOSPITALIST

## 2023-09-19 PROCEDURE — 80053 COMPREHEN METABOLIC PANEL: CPT

## 2023-09-19 PROCEDURE — 86381 MITOCHONDRIAL ANTIBODY EACH: CPT | Performed by: HOSPITALIST

## 2023-09-19 PROCEDURE — 85025 COMPLETE CBC W/AUTO DIFF WBC: CPT

## 2023-09-19 PROCEDURE — 99233 SBSQ HOSP IP/OBS HIGH 50: CPT | Mod: ,,, | Performed by: HOSPITALIST

## 2023-09-19 PROCEDURE — 36415 COLL VENOUS BLD VENIPUNCTURE: CPT

## 2023-09-19 PROCEDURE — 99233 PR SUBSEQUENT HOSPITAL CARE,LEVL III: ICD-10-PCS | Mod: ,,, | Performed by: HOSPITALIST

## 2023-09-19 RX ORDER — LATANOPROST 50 UG/ML
1 SOLUTION/ DROPS OPHTHALMIC NIGHTLY
Status: DISCONTINUED | OUTPATIENT
Start: 2023-09-19 | End: 2023-09-22 | Stop reason: HOSPADM

## 2023-09-19 RX ORDER — AMLODIPINE BESYLATE 5 MG/1
5 TABLET ORAL DAILY
Status: CANCELLED | OUTPATIENT
Start: 2023-09-19

## 2023-09-19 RX ORDER — POTASSIUM CHLORIDE 20 MEQ/1
20 TABLET, EXTENDED RELEASE ORAL 2 TIMES DAILY
Status: CANCELLED | OUTPATIENT
Start: 2023-09-19

## 2023-09-19 RX ORDER — INSULIN ASPART 100 [IU]/ML
12 INJECTION, SOLUTION INTRAVENOUS; SUBCUTANEOUS
Status: DISCONTINUED | OUTPATIENT
Start: 2023-09-19 | End: 2023-09-20

## 2023-09-19 RX ORDER — NIFEDIPINE 30 MG/1
60 TABLET, EXTENDED RELEASE ORAL DAILY
Status: DISCONTINUED | OUTPATIENT
Start: 2023-09-19 | End: 2023-09-22 | Stop reason: HOSPADM

## 2023-09-19 RX ORDER — METRONIDAZOLE 500 MG/1
500 TABLET ORAL EVERY 8 HOURS
Status: DISCONTINUED | OUTPATIENT
Start: 2023-09-19 | End: 2023-09-22 | Stop reason: HOSPADM

## 2023-09-19 RX ORDER — POLYETHYLENE GLYCOL 3350 17 G/17G
17 POWDER, FOR SOLUTION ORAL DAILY
Status: DISCONTINUED | OUTPATIENT
Start: 2023-09-20 | End: 2023-09-20

## 2023-09-19 RX ORDER — INSULIN ASPART 100 [IU]/ML
0-10 INJECTION, SOLUTION INTRAVENOUS; SUBCUTANEOUS
Status: DISCONTINUED | OUTPATIENT
Start: 2023-09-19 | End: 2023-09-22 | Stop reason: HOSPADM

## 2023-09-19 RX ORDER — CEFPODOXIME PROXETIL 200 MG/1
200 TABLET, FILM COATED ORAL EVERY 12 HOURS
Status: DISCONTINUED | OUTPATIENT
Start: 2023-09-19 | End: 2023-09-22 | Stop reason: HOSPADM

## 2023-09-19 RX ORDER — SODIUM,POTASSIUM PHOSPHATES 280-250MG
2 POWDER IN PACKET (EA) ORAL
Status: COMPLETED | OUTPATIENT
Start: 2023-09-19 | End: 2023-09-19

## 2023-09-19 RX ORDER — SODIUM,POTASSIUM PHOSPHATES 280-250MG
1 POWDER IN PACKET (EA) ORAL
Status: CANCELLED | OUTPATIENT
Start: 2023-09-19

## 2023-09-19 RX ORDER — AMOXICILLIN 250 MG
1 CAPSULE ORAL DAILY
Status: DISCONTINUED | OUTPATIENT
Start: 2023-09-19 | End: 2023-09-20

## 2023-09-19 RX ORDER — POTASSIUM CHLORIDE 20 MEQ/1
40 TABLET, EXTENDED RELEASE ORAL 3 TIMES DAILY
Status: COMPLETED | OUTPATIENT
Start: 2023-09-19 | End: 2023-09-19

## 2023-09-19 RX ADMIN — SENNOSIDES AND DOCUSATE SODIUM 1 TABLET: 50; 8.6 TABLET ORAL at 12:09

## 2023-09-19 RX ADMIN — CIPROFLOXACIN 500 MG: 500 TABLET, FILM COATED ORAL at 09:09

## 2023-09-19 RX ADMIN — CEFPODOXIME PROXETIL 200 MG: 200 TABLET, FILM COATED ORAL at 12:09

## 2023-09-19 RX ADMIN — METRONIDAZOLE 500 MG: 500 TABLET ORAL at 06:09

## 2023-09-19 RX ADMIN — FAMOTIDINE 20 MG: 20 TABLET ORAL at 09:09

## 2023-09-19 RX ADMIN — POTASSIUM & SODIUM PHOSPHATES POWDER PACK 280-160-250 MG 2 PACKET: 280-160-250 PACK at 09:09

## 2023-09-19 RX ADMIN — LEVOTHYROXINE SODIUM 50 MCG: 50 TABLET ORAL at 06:09

## 2023-09-19 RX ADMIN — INSULIN ASPART 9 UNITS: 100 INJECTION, SOLUTION INTRAVENOUS; SUBCUTANEOUS at 06:09

## 2023-09-19 RX ADMIN — LATANOPROST 1 DROP: 50 SOLUTION OPHTHALMIC at 09:09

## 2023-09-19 RX ADMIN — INSULIN ASPART 2 UNITS: 100 INJECTION, SOLUTION INTRAVENOUS; SUBCUTANEOUS at 09:09

## 2023-09-19 RX ADMIN — LOSARTAN POTASSIUM AND HYDROCHLOROTHIAZIDE 1 TABLET: 100; 25 TABLET, FILM COATED ORAL at 09:09

## 2023-09-19 RX ADMIN — INSULIN DETEMIR 4 UNITS: 100 INJECTION, SOLUTION SUBCUTANEOUS at 12:09

## 2023-09-19 RX ADMIN — CEFPODOXIME PROXETIL 200 MG: 200 TABLET, FILM COATED ORAL at 09:09

## 2023-09-19 RX ADMIN — INSULIN ASPART 12 UNITS: 100 INJECTION, SOLUTION INTRAVENOUS; SUBCUTANEOUS at 05:09

## 2023-09-19 RX ADMIN — INSULIN ASPART 8 UNITS: 100 INJECTION, SOLUTION INTRAVENOUS; SUBCUTANEOUS at 05:09

## 2023-09-19 RX ADMIN — ENOXAPARIN SODIUM 40 MG: 40 INJECTION SUBCUTANEOUS at 05:09

## 2023-09-19 RX ADMIN — INSULIN ASPART 12 UNITS: 100 INJECTION, SOLUTION INTRAVENOUS; SUBCUTANEOUS at 12:09

## 2023-09-19 RX ADMIN — INSULIN ASPART 6 UNITS: 100 INJECTION, SOLUTION INTRAVENOUS; SUBCUTANEOUS at 12:09

## 2023-09-19 RX ADMIN — NIFEDIPINE 60 MG: 30 TABLET, FILM COATED, EXTENDED RELEASE ORAL at 12:09

## 2023-09-19 RX ADMIN — POTASSIUM & SODIUM PHOSPHATES POWDER PACK 280-160-250 MG 2 PACKET: 280-160-250 PACK at 05:09

## 2023-09-19 RX ADMIN — POTASSIUM CHLORIDE 40 MEQ: 1500 TABLET, EXTENDED RELEASE ORAL at 09:09

## 2023-09-19 RX ADMIN — METRONIDAZOLE 500 MG: 500 TABLET ORAL at 09:09

## 2023-09-19 RX ADMIN — INSULIN DETEMIR 20 UNITS: 100 INJECTION, SOLUTION SUBCUTANEOUS at 09:09

## 2023-09-19 RX ADMIN — ONDANSETRON 4 MG: 2 INJECTION INTRAMUSCULAR; INTRAVENOUS at 09:09

## 2023-09-19 RX ADMIN — POTASSIUM CHLORIDE 40 MEQ: 1500 TABLET, EXTENDED RELEASE ORAL at 02:09

## 2023-09-19 RX ADMIN — METOPROLOL SUCCINATE 100 MG: 100 TABLET, EXTENDED RELEASE ORAL at 09:09

## 2023-09-19 RX ADMIN — METRONIDAZOLE 500 MG: 500 TABLET ORAL at 02:09

## 2023-09-19 RX ADMIN — POTASSIUM & SODIUM PHOSPHATES POWDER PACK 280-160-250 MG 2 PACKET: 280-160-250 PACK at 12:09

## 2023-09-19 RX ADMIN — INSULIN ASPART 3 UNITS: 100 INJECTION, SOLUTION INTRAVENOUS; SUBCUTANEOUS at 09:09

## 2023-09-19 NOTE — MEDICAL/APP STUDENT
HPI: 57F with PMHx of HTN, HLD, GERD, COPD presented to the ED with elevated BG and AMS. Pt currently prescribed metformin, but has not recently taken it. On admission to the ED pt denied having HA or CP Any further history is limited by pt mental status.     ED course: afebrile, HDS. Initial labs K 4.7, Glucose 1130, bicarb 26, AG 18,  BHB 1.1, Ph 7.36, Cr 2.1, Lactate 2.7, Ca 11, AST 66, , Lipase 77. , troponin 0.085. EKG NSR @ 64 bpm with LVH associated changes. Patient received 1L NS bolus, aspirin 325 mg, KCL oral and IV, empiric vancomycin and zosyn. She was started on insulin bolus and infusion. Repeat glucose improved to 580., however, K dropped to 2.9 and insulin gtt was held while K was replaced. Pt continued on insulin gtt at 0.1u/kg/hr and now at 460. Bicarb 26 on arrival, downtrended to 14. Lactate cleared. Additionally, CTAP positive for suspected localized free intraperitoneal air adjacent to the distal descending colon. Evaluated by CRS, no emergent surgical intervention, started on cipro/flagyl.      Hospital Course:      Interval History: No acute events overnight. Patient reports some nausea and a mild headache this morning. She states that she has been eating and sleeping well. She states that she is not able to walk unassisted and is nervous about going home without PT. Last bowel movement was 5 days ago, despite miralax.      ROS: Denies vomiting, diarrhea, abdominal pain, chest pain, or shortness of breath. Positive for nausea and constipation.      Objective:  General: alert and oriented, in no acute distress  HEENT: normocephalic, atraumatic, extraocular muscles grossly intact  Lungs: CTA bilaterally   Cardiovascular: RRR, S1 and S2 present, no murmurs  Abdomen: soft, non distended, not tender to palpation, no masses  Extremities: no lower extremity edema, 2+ posterior tibial pulses     Assessment and Plan  DKA  Patient presented with AMS and initial BG of >500. Started on  insulin drip. BG now in mid 200s. Patient is now able to respond to questions but is still mildly confused and somnolent.      Plan  -increase detemir 20 BID  -increase aspart 10 with meals  -diabetic diet  -low dose sliding scale insulin     Diverticulitis  Patient presented with AMS. CT Abd showed suspected localized free intraperitoneal air adjacent to the distal descending colon, possible perforated diverticula. Patient denies abdominal pain but does report some constipation prior to presentation with dark stools. Physical exam of abdomen benign.      Plan  -complete 4 day course of antibiotics today  -change cipro to cefpodoximine due to increase liver enzymes  -outpatient colonoscopy     Hypertension  Elevated BP since presentation. Patient initially unable to take oral medications due to AMS. IV hydralazine administered with insufficient effect. Today patient is able to take oral medications.     Plan  -continue losartan/HCTZ 100mg/25mg  -continue metoprolol succinate 100mg  -add nifefipine 60mg daily   -hydralazine 50mg TID PRN for systolic BP >180     Elevated liver enzymes  , , Alk Phos 498 on 9/19. Increase from baseline. Patient denies abdominal pain. Has some nausea.     Plan  -US of abdomen  -change cipro to cefpodoximine    Hypothyroidism  -continue levothyroxine 50mcg     Headache  -tylenol PRN      GERD  -continue famotidine

## 2023-09-19 NOTE — ASSESSMENT & PLAN NOTE
-CT A/P concerning for diverticulitis w/ perforation  - Colorectal consulted, surgery not indicated   - Started on IV Cipro/Flagyl  For 4 days course  - advancing diet as tolerated  - outpatient colonoscopy  - finish 10-14 d course of Cefpodoxime/flagyl

## 2023-09-19 NOTE — PROGRESS NOTES
[facility] - Tele[facility]  Progress Note    Patient Name: Olga Lopez  MRN: [id number]  Patient Class: IP- Inpatient   Admission Date: 9/15/2023  Length of Stay: 3 days  Attending Physician: Courtney Canas*  Primary Care Provider: Jonh Berg MD        Subjective:     Principal Problem:DKA (diabetic ketoacidosis)        HPI:  57F with PMHx of HTN, HLD, GERD, COPD presented to the ED with elevated BG and AMS. Pt currently prescribed metformin, but has not recently taken it. On admission to the ED pt denied having HA or CP Any further history is limited by pt mental status.    ED course: afebrile, HDS. Initial labs K 4.7, Glucose 1130, bicarb 26, AG 18,  BHB 1.1, Ph 7.36, Cr 2.1, Lactate 2.7, Ca 11, AST 66, , Lipase 77. , troponin 0.085. EKG NSR @ 64 bpm with LVH associated changes. Patient received 1L NS bolus, aspirin 325 mg, KCL oral and IV, empiric vancomycin and zosyn. She was started on insulin bolus and infusion. Repeat glucose improved to 580., however, K dropped to 2.9 and insulin gtt was held while K was replaced. Pt continued on insulin gtt at 0.1u/kg/hr and now at 460. Bicarb 26 on arrival, downtrended to 14. Lactate cleared. Additionally, CTAP positive for suspected localized free intraperitoneal air adjacent to the distal descending colon. Evaluated by CRS, no emergent surgical intervention, started on cipro/flagyl.       Overview/Hospital Course:  57F with PMHx of HTN, HLD, GERD, COPD presented for AMS, found to be in DKA      Interval History:   Transaminitis work-up remarkable for hepatic steatosis on ultrasound. Cipro switched to Cefpodoxime due to transaminitis. Hepatitis panel negative. Insulin requirements increased today (20 BID basal, 12 prandial), glucose still not controlled. PT/OT consulted, difficulty walking unassisted and may need SNF.      Review of Systems  Objective:     Vital Signs (Most Recent):  Temp: 98.1 °F (36.7 °C)  (09/19/23 1201)  Pulse: 85 (09/19/23 1201)  Resp: 18 (09/19/23 1201)  BP: 126/83 (09/19/23 1201)  SpO2: 100 % (09/19/23 1201) Vital Signs (24h Range):  Temp:  [98 °F (36.7 °C)-98.4 °F (36.9 °C)] 98.1 °F (36.7 °C)  Pulse:  [60-85] 85  Resp:  [17-19] 18  SpO2:  [96 %-100 %] 100 %  BP: (126-171)/(77-93) 126/83     Weight: 91.2 kg (201 lb)  Body mass index is 37.98 kg/m².    Intake/Output Summary (Last 24 hours) at 9/19/2023 1304  Last data filed at 9/19/2023 0947  Gross per 24 hour   Intake --   Output 1200 ml   Net -1200 ml         Physical Exam  Constitutional:       General: She is not in acute distress.     Appearance: Normal appearance.   HENT:      Head: Normocephalic and atraumatic.   Eyes:      Extraocular Movements: Extraocular movements intact.      Pupils: Pupils are equal, round, and reactive to light.   Cardiovascular:      Rate and Rhythm: Normal rate and regular rhythm.   Pulmonary:      Effort: Pulmonary effort is normal. No respiratory distress.      Breath sounds: Normal breath sounds.   Abdominal:      General: Abdomen is flat.      Palpations: Abdomen is soft.      Tenderness: There is abdominal tenderness. There is no guarding or rebound.      Comments: Diffusely tender   Musculoskeletal:      Right lower leg: No edema.      Left lower leg: No edema.   Neurological:      General: No focal deficit present.      Mental Status: She is alert and oriented to person, place, and time.             Significant Labs: All pertinent labs within the past 24 hours have been reviewed.    Significant Imaging: I have reviewed all pertinent imaging results/findings within the past 24 hours.      Assessment/Plan:      * DKA (diabetic ketoacidosis)  Pt with AMS, slightly improving  Blood sugar on arrival 1130 with a bicarb 26, anion gap 18, BHB 1.1.  HbA1c pending  Likely induced by medication noncompliance, abd infection/possible perforated diverticula  Home DM regimen:  metformin 500qd    Plan:  - DKA/HHS Pathway  initiated  - Start insulin gtt per protocol with q1h accuchecks while on the drip  - Once Bicarb >18, Anion gap <12, Glucose <200 on 2 readings and able to tolerate PO intake without nausea and vomiting, transition to subq insulin with a 1-2 hr overlap with drip  · Long acting insulin dose:  Detemir (0.25mg/kg) daily or in 2 doses  · Short acting insulin dose:  Aspart (0.08mg/kg) units per meal  - Start normal saline at 125cc/hr.  Once blood sugar is 200, change IVF to D5 1/2 NS at 50cc/hr  - Monitor electrolytes with BMP q4h while on insulin gtt and place on telemetry  - Bariatric clear liquid diet while on insulin gtt then change to diabetic diet when initiating subq insulin with accuchecks 4x daily before meals and at bedtime (AC and HS)  - stopped insulin gtt today  - Determir 20 BID, with aspart 12 units TIDWM and ldsssi  - Diabetic diet      Transaminitis  - AST/ALT elevation  - RFs for hepatic steatosis present  - liver US showed hepatic steatosis  - Hep panel negative  - antibodies pending      Diverticulitis  -CT A/P concerning for diverticulitis w/ perforation  - Colorectal consulted, surgery not indicated   - Started on IV Cipro/Flagyl  For 4 days course  - advancing diet as tolerated  - outpatient colonoscopy  - finish 10-14 d course of Cefpodoxime/flagyl       ROBERTO (acute kidney injury)  Creatinine 2.1 on admit, baseline around 0.8  - Likely pre-renal from dehydration and volume losses  - improved with fluid resuscitation  Plan:     - Check urine lytes  - Check urine protein creatinine ratio  - Strict I&Os and daily weights   - Avoid nephrotoxic agents such as NSAIDs, gadolinium and IV radiocontrast  - Renally dose meds to current GFR  - Maintain MAP > 65  - pre-renal, resolved w/ fluids      Elevated brain natriuretic peptide (BNP) level  Pt has hx diastolic heart failure,  on admission, CXR with evidence of possible early congestion. Exam without evidence of volume overload, however pt is  requiring 3L NC to maintain O2 sats >95%.     Plan  - TTE pending  - Pt on home metoprolol, lasix, losartan-HCTZ per recent dispense report  - Lasix or spironolactone prn      COPD (chronic obstructive pulmonary disease)  - pCO2 elevated to 55 on arrival with hx of COPD. No wheezing on exam, breath sounds nl, CXR without evidence of hyperinflation/air trapping    Plan  - duonebs prn  -restarted inhaler tx daily     GERD (gastroesophageal reflux disease)  IV PPI while NPO      Hyperlipidemia  Continue home Atorvastatin    Postoperative hypothyroidism  Continuing home levothyroxine 50 mcg daily      HTN (hypertension)  Recent dispense report shows most recent BP meds include metoprolol- mg, losartan-HCTZ 100-25 mg, lasix 40 mg daily    - patient more alert today, able to tolerate PO intake  - restarting home anti-hypertensives   - Nifedipine added      VTE Risk Mitigation (From admission, onward)         Ordered     enoxaparin injection 40 mg  Every 24 hours         09/18/23 1050     IP VTE HIGH RISK PATIENT  Once         09/16/23 0312     Place KADI hose  Until discontinued         09/16/23 0312     Place sequential compression device  Until discontinued         09/16/23 0312                Discharge Planning   CHRISTIANNE: 9/20/2023     Code Status: Full Code   Is the patient medically ready for discharge?: No    Reason for patient still in hospital (select all that apply): Patient trending condition  Discharge Plan A: Home with family   Discharge Delays: None known at this time              Roz Trejo DO  Department of Hospital Medicine   [facility] - Telemetry Stepdown

## 2023-09-19 NOTE — HOSPITAL COURSE
Hospital Course: 57F with PMHx of HTN, HLD, GERD, COPD presented for AMS that was admitted for DKA. Started on insulin drip and IVF. CTAP positive for suspected localized free intraperitoneal air adjacent to the distal descending colon. Evaluated by CRS, no emergent surgical intervention, started on cipro/flagyl. Patient hypertensive on presentation, BP medication adjusted. Patient transitioned to basal bolus insulin regimen and adjusted each day. Mentation gradually improved with treatment. Liver enzymes elevated on day 5. Cipro changed to cefpodoximine. RUQ US showed diffuse hepatic steatosis. Will treat with cefpodoximine and flagyl for total of 14 days. Will discharge on new HTN and DM regimen as optimized during hospitalization. PT/OT evaluated patient and she will be discharged with home health PT/OT, along with rolling walker and bedside commode. Continue Metformin daily and Levemir in the morning and evening. She should log her blood sugars before each meal and nightly. Follow-up with endocrinology outpatient. Continue Cefpodoxime and Flagyl to complete a 14 day course for perforated diverticulitis. Follow-up outpatient for colonoscopy.

## 2023-09-19 NOTE — ASSESSMENT & PLAN NOTE
Pt with AMS, slightly improving  Blood sugar on arrival 1130 with a bicarb 26, anion gap 18, BHB 1.1.  HbA1c pending  Likely induced by medication noncompliance, abd infection/possible perforated diverticula  Home DM regimen:  metformin 500qd    Plan:  - DKA/HHS Pathway initiated  - Start insulin gtt per protocol with q1h accuchecks while on the drip  - Once Bicarb >18, Anion gap <12, Glucose <200 on 2 readings and able to tolerate PO intake without nausea and vomiting, transition to subq insulin with a 1-2 hr overlap with drip  · Long acting insulin dose:  Detemir (0.25mg/kg) daily or in 2 doses  · Short acting insulin dose:  Aspart (0.08mg/kg) units per meal  - Start normal saline at 125cc/hr.  Once blood sugar is 200, change IVF to D5 1/2 NS at 50cc/hr  - Monitor electrolytes with BMP q4h while on insulin gtt and place on telemetry  - Bariatric clear liquid diet while on insulin gtt then change to diabetic diet when initiating subq insulin with accuchecks 4x daily before meals and at bedtime (AC and HS)  - stopped insulin gtt today  - Determir 20 BID, with aspart 12 units TIDWM and ldsssi  - Diabetic diet

## 2023-09-19 NOTE — PLAN OF CARE
[facility] - Telemetry Stepdown  Discharge Reassessment    Primary Care Provider: Jonh Berg MD    Expected Discharge Date: 9/20/2023    Reassessment (most recent)       Discharge Reassessment - 09/19/23 0942          Discharge Reassessment    Assessment Type Discharge Planning Reassessment     Did the patient's condition or plan change since previous assessment? No     Discharge Plan A Home with family     Discharge Plan B Home with family     DME Needed Upon Discharge  none     Transition of Care Barriers None     Why the patient remains in the hospital Requires continued medical care        Post-Acute Status    Post-Acute Authorization Other     Other Status No Post-Acute Service Needs     Discharge Delays None known at this time                 Asha John, [place]  [facility]  Ext. [phone]

## 2023-09-19 NOTE — ASSESSMENT & PLAN NOTE
- AST/ALT elevation  - RFs for hepatic steatosis present  - liver US showed hepatic steatosis  - Hep panel negative  - antibodies pending

## 2023-09-19 NOTE — ASSESSMENT & PLAN NOTE
Recent dispense report shows most recent BP meds include metoprolol- mg, losartan-HCTZ 100-25 mg, lasix 40 mg daily    - patient more alert today, able to tolerate PO intake  - restarting home anti-hypertensives   - Nifedipine added

## 2023-09-19 NOTE — PLAN OF CARE
Problem: Fall Injury Risk  Goal: Absence of Fall and Fall-Related Injury  Outcome: Ongoing, Progressing     Problem: Adult Inpatient Plan of Care  Goal: Plan of Care Review  Outcome: Ongoing, Progressing  Goal: Patient-Specific Goal (Individualized)  Outcome: Ongoing, Progressing  Goal: Absence of Hospital-Acquired Illness or Injury  Outcome: Ongoing, Progressing  Goal: Optimal Comfort and Wellbeing  Outcome: Ongoing, Progressing  Goal: Readiness for Transition of Care  Outcome: Ongoing, Progressing    Patient rested during shift with no pain or discomfort. Patient ambulated to bathroom independently.

## 2023-09-19 NOTE — SUBJECTIVE & OBJECTIVE
Interval History:   Transaminitis work-up remarkable for hepatic steatosis on ultrasound. Cipro switched to Cefpodoxime due to transaminitis. Hepatitis panel negative. Insulin requirements increased today (20 BID basal, 12 prandial), glucose still not controlled. PT/OT consulted, difficulty walking unassisted and may need SNF.      Review of Systems  Objective:     Vital Signs (Most Recent):  Temp: 98.1 °F (36.7 °C) (09/19/23 1201)  Pulse: 85 (09/19/23 1201)  Resp: 18 (09/19/23 1201)  BP: 126/83 (09/19/23 1201)  SpO2: 100 % (09/19/23 1201) Vital Signs (24h Range):  Temp:  [98 °F (36.7 °C)-98.4 °F (36.9 °C)] 98.1 °F (36.7 °C)  Pulse:  [60-85] 85  Resp:  [17-19] 18  SpO2:  [96 %-100 %] 100 %  BP: (126-171)/(77-93) 126/83     Weight: 91.2 kg (201 lb)  Body mass index is 37.98 kg/m².    Intake/Output Summary (Last 24 hours) at 9/19/2023 1304  Last data filed at 9/19/2023 0947  Gross per 24 hour   Intake --   Output 1200 ml   Net -1200 ml         Physical Exam  Constitutional:       General: She is not in acute distress.     Appearance: Normal appearance.   HENT:      Head: Normocephalic and atraumatic.   Eyes:      Extraocular Movements: Extraocular movements intact.      Pupils: Pupils are equal, round, and reactive to light.   Cardiovascular:      Rate and Rhythm: Normal rate and regular rhythm.   Pulmonary:      Effort: Pulmonary effort is normal. No respiratory distress.      Breath sounds: Normal breath sounds.   Abdominal:      General: Abdomen is flat.      Palpations: Abdomen is soft.      Tenderness: There is abdominal tenderness. There is no guarding or rebound.      Comments: Diffusely tender   Musculoskeletal:      Right lower leg: No edema.      Left lower leg: No edema.   Neurological:      General: No focal deficit present.      Mental Status: She is alert and oriented to person, place, and time.             Significant Labs: All pertinent labs within the past 24 hours have been reviewed.    Significant  Imaging: I have reviewed all pertinent imaging results/findings within the past 24 hours.

## 2023-09-19 NOTE — PLAN OF CARE
Per MD, PT/OT has been consulted for evals due to a decline in functional status; patient is unable to ambulate independently. Baseline is being able to ambulate independently and lives alone.    Asha John LCSW  [facility]  Ext. [phone]

## 2023-09-20 LAB
ALBUMIN SERPL BCP-MCNC: 2.5 G/DL (ref 3.5–5.2)
ALP SERPL-CCNC: 574 U/L (ref 55–135)
ALT SERPL W/O P-5'-P-CCNC: 255 U/L (ref 10–44)
ANION GAP SERPL CALC-SCNC: 9 MMOL/L (ref 8–16)
AST SERPL-CCNC: 162 U/L (ref 10–40)
BACTERIA BLD CULT: NORMAL
BACTERIA BLD CULT: NORMAL
BASOPHILS # BLD AUTO: 0.04 K/UL (ref 0–0.2)
BASOPHILS NFR BLD: 0.6 % (ref 0–1.9)
BILIRUB SERPL-MCNC: 0.6 MG/DL (ref 0.1–1)
BUN SERPL-MCNC: 14 MG/DL (ref 6–20)
CALCIUM SERPL-MCNC: 8.9 MG/DL (ref 8.7–10.5)
CHLORIDE SERPL-SCNC: 105 MMOL/L (ref 95–110)
CO2 SERPL-SCNC: 24 MMOL/L (ref 23–29)
CREAT SERPL-MCNC: 0.7 MG/DL (ref 0.5–1.4)
DIFFERENTIAL METHOD: ABNORMAL
EOSINOPHIL # BLD AUTO: 0.1 K/UL (ref 0–0.5)
EOSINOPHIL NFR BLD: 1.9 % (ref 0–8)
ERYTHROCYTE [DISTWIDTH] IN BLOOD BY AUTOMATED COUNT: 15.5 % (ref 11.5–14.5)
EST. GFR  (NO RACE VARIABLE): >60 ML/MIN/1.73 M^2
GGT SERPL-CCNC: 1635 U/L (ref 8–55)
GLUCOSE SERPL-MCNC: 225 MG/DL (ref 70–110)
HCT VFR BLD AUTO: 44.7 % (ref 37–48.5)
HGB BLD-MCNC: 13.3 G/DL (ref 12–16)
IMM GRANULOCYTES # BLD AUTO: 0.1 K/UL (ref 0–0.04)
IMM GRANULOCYTES NFR BLD AUTO: 1.4 % (ref 0–0.5)
LYMPHOCYTES # BLD AUTO: 1.5 K/UL (ref 1–4.8)
LYMPHOCYTES NFR BLD: 21.7 % (ref 18–48)
MAGNESIUM SERPL-MCNC: 1.8 MG/DL (ref 1.6–2.6)
MCH RBC QN AUTO: 26.5 PG (ref 27–31)
MCHC RBC AUTO-ENTMCNC: 29.8 G/DL (ref 32–36)
MCV RBC AUTO: 89 FL (ref 82–98)
MITOCHONDRIA AB TITR SER IF: NORMAL {TITER}
MONOCYTES # BLD AUTO: 0.5 K/UL (ref 0.3–1)
MONOCYTES NFR BLD: 7.6 % (ref 4–15)
NEUTROPHILS # BLD AUTO: 4.6 K/UL (ref 1.8–7.7)
NEUTROPHILS NFR BLD: 66.8 % (ref 38–73)
NRBC BLD-RTO: 1 /100 WBC
PHOSPHATE SERPL-MCNC: 2.8 MG/DL (ref 2.7–4.5)
PLATELET # BLD AUTO: 161 K/UL (ref 150–450)
PMV BLD AUTO: 12.5 FL (ref 9.2–12.9)
POCT GLUCOSE: 247 MG/DL (ref 70–110)
POCT GLUCOSE: 299 MG/DL (ref 70–110)
POCT GLUCOSE: 324 MG/DL (ref 70–110)
POCT GLUCOSE: 340 MG/DL (ref 70–110)
POTASSIUM SERPL-SCNC: 3.8 MMOL/L (ref 3.5–5.1)
PROT SERPL-MCNC: 6.5 G/DL (ref 6–8.4)
RBC # BLD AUTO: 5.02 M/UL (ref 4–5.4)
SMOOTH MUSCLE AB TITR SER IF: NORMAL {TITER}
SODIUM SERPL-SCNC: 138 MMOL/L (ref 136–145)
WBC # BLD AUTO: 6.95 K/UL (ref 3.9–12.7)

## 2023-09-20 PROCEDURE — 97165 OT EVAL LOW COMPLEX 30 MIN: CPT

## 2023-09-20 PROCEDURE — 25000003 PHARM REV CODE 250

## 2023-09-20 PROCEDURE — 82977 ASSAY OF GGT: CPT

## 2023-09-20 PROCEDURE — 36415 COLL VENOUS BLD VENIPUNCTURE: CPT

## 2023-09-20 PROCEDURE — 63600175 PHARM REV CODE 636 W HCPCS

## 2023-09-20 PROCEDURE — 97535 SELF CARE MNGMENT TRAINING: CPT

## 2023-09-20 PROCEDURE — 20600001 HC STEP DOWN PRIVATE ROOM

## 2023-09-20 PROCEDURE — 84100 ASSAY OF PHOSPHORUS: CPT

## 2023-09-20 PROCEDURE — 94761 N-INVAS EAR/PLS OXIMETRY MLT: CPT

## 2023-09-20 PROCEDURE — 80053 COMPREHEN METABOLIC PANEL: CPT

## 2023-09-20 PROCEDURE — 99232 SBSQ HOSP IP/OBS MODERATE 35: CPT | Mod: ,,, | Performed by: HOSPITALIST

## 2023-09-20 PROCEDURE — 97161 PT EVAL LOW COMPLEX 20 MIN: CPT

## 2023-09-20 PROCEDURE — 99232 PR SUBSEQUENT HOSPITAL CARE,LEVL II: ICD-10-PCS | Mod: ,,, | Performed by: HOSPITALIST

## 2023-09-20 PROCEDURE — 94640 AIRWAY INHALATION TREATMENT: CPT

## 2023-09-20 PROCEDURE — 25000242 PHARM REV CODE 250 ALT 637 W/ HCPCS

## 2023-09-20 PROCEDURE — 85025 COMPLETE CBC W/AUTO DIFF WBC: CPT

## 2023-09-20 PROCEDURE — 83735 ASSAY OF MAGNESIUM: CPT

## 2023-09-20 RX ORDER — INSULIN ASPART 100 [IU]/ML
15 INJECTION, SOLUTION INTRAVENOUS; SUBCUTANEOUS
Status: DISCONTINUED | OUTPATIENT
Start: 2023-09-20 | End: 2023-09-21

## 2023-09-20 RX ORDER — POLYETHYLENE GLYCOL 3350 17 G/17G
17 POWDER, FOR SOLUTION ORAL 2 TIMES DAILY
Status: DISCONTINUED | OUTPATIENT
Start: 2023-09-20 | End: 2023-09-22 | Stop reason: HOSPADM

## 2023-09-20 RX ORDER — BISACODYL 10 MG
10 SUPPOSITORY, RECTAL RECTAL DAILY
Status: COMPLETED | OUTPATIENT
Start: 2023-09-20 | End: 2023-09-20

## 2023-09-20 RX ORDER — SODIUM,POTASSIUM PHOSPHATES 280-250MG
1 POWDER IN PACKET (EA) ORAL
Status: COMPLETED | OUTPATIENT
Start: 2023-09-20 | End: 2023-09-20

## 2023-09-20 RX ORDER — LANOLIN ALCOHOL/MO/W.PET/CERES
400 CREAM (GRAM) TOPICAL 2 TIMES DAILY
Status: DISCONTINUED | OUTPATIENT
Start: 2023-09-20 | End: 2023-09-22 | Stop reason: HOSPADM

## 2023-09-20 RX ORDER — AMOXICILLIN 250 MG
2 CAPSULE ORAL 2 TIMES DAILY
Status: DISCONTINUED | OUTPATIENT
Start: 2023-09-20 | End: 2023-09-22 | Stop reason: HOSPADM

## 2023-09-20 RX ORDER — INSULIN ASPART 100 [IU]/ML
15 INJECTION, SOLUTION INTRAVENOUS; SUBCUTANEOUS
Status: CANCELLED | OUTPATIENT
Start: 2023-09-20

## 2023-09-20 RX ADMIN — INSULIN ASPART 15 UNITS: 100 INJECTION, SOLUTION INTRAVENOUS; SUBCUTANEOUS at 04:09

## 2023-09-20 RX ADMIN — POTASSIUM & SODIUM PHOSPHATES POWDER PACK 280-160-250 MG 1 PACKET: 280-160-250 PACK at 11:09

## 2023-09-20 RX ADMIN — CEFPODOXIME PROXETIL 200 MG: 200 TABLET, FILM COATED ORAL at 08:09

## 2023-09-20 RX ADMIN — POTASSIUM & SODIUM PHOSPHATES POWDER PACK 280-160-250 MG 1 PACKET: 280-160-250 PACK at 04:09

## 2023-09-20 RX ADMIN — FAMOTIDINE 20 MG: 20 TABLET ORAL at 08:09

## 2023-09-20 RX ADMIN — LOSARTAN POTASSIUM AND HYDROCHLOROTHIAZIDE 1 TABLET: 100; 25 TABLET, FILM COATED ORAL at 08:09

## 2023-09-20 RX ADMIN — INSULIN ASPART 8 UNITS: 100 INJECTION, SOLUTION INTRAVENOUS; SUBCUTANEOUS at 04:09

## 2023-09-20 RX ADMIN — BISACODYL 10 MG: 10 SUPPOSITORY RECTAL at 11:09

## 2023-09-20 RX ADMIN — INSULIN ASPART 4 UNITS: 100 INJECTION, SOLUTION INTRAVENOUS; SUBCUTANEOUS at 08:09

## 2023-09-20 RX ADMIN — POLYETHYLENE GLYCOL 3350 17 G: 17 POWDER, FOR SOLUTION ORAL at 08:09

## 2023-09-20 RX ADMIN — METRONIDAZOLE 500 MG: 500 TABLET ORAL at 06:09

## 2023-09-20 RX ADMIN — ALBUTEROL SULFATE 2 PUFF: 108 INHALANT RESPIRATORY (INHALATION) at 11:09

## 2023-09-20 RX ADMIN — INSULIN DETEMIR 5 UNITS: 100 INJECTION, SOLUTION SUBCUTANEOUS at 11:09

## 2023-09-20 RX ADMIN — METRONIDAZOLE 500 MG: 500 TABLET ORAL at 01:09

## 2023-09-20 RX ADMIN — INSULIN ASPART 8 UNITS: 100 INJECTION, SOLUTION INTRAVENOUS; SUBCUTANEOUS at 08:09

## 2023-09-20 RX ADMIN — ENOXAPARIN SODIUM 40 MG: 40 INJECTION SUBCUTANEOUS at 04:09

## 2023-09-20 RX ADMIN — Medication 400 MG: at 11:09

## 2023-09-20 RX ADMIN — Medication 400 MG: at 08:09

## 2023-09-20 RX ADMIN — NIFEDIPINE 60 MG: 30 TABLET, FILM COATED, EXTENDED RELEASE ORAL at 08:09

## 2023-09-20 RX ADMIN — INSULIN ASPART 12 UNITS: 100 INJECTION, SOLUTION INTRAVENOUS; SUBCUTANEOUS at 08:09

## 2023-09-20 RX ADMIN — INSULIN DETEMIR 20 UNITS: 100 INJECTION, SOLUTION SUBCUTANEOUS at 08:09

## 2023-09-20 RX ADMIN — METRONIDAZOLE 500 MG: 500 TABLET ORAL at 09:09

## 2023-09-20 RX ADMIN — INSULIN ASPART 6 UNITS: 100 INJECTION, SOLUTION INTRAVENOUS; SUBCUTANEOUS at 11:09

## 2023-09-20 RX ADMIN — LEVOTHYROXINE SODIUM 50 MCG: 50 TABLET ORAL at 06:09

## 2023-09-20 RX ADMIN — METOPROLOL SUCCINATE 100 MG: 100 TABLET, EXTENDED RELEASE ORAL at 08:09

## 2023-09-20 RX ADMIN — LATANOPROST 1 DROP: 50 SOLUTION OPHTHALMIC at 08:09

## 2023-09-20 RX ADMIN — SENNOSIDES AND DOCUSATE SODIUM 1 TABLET: 50; 8.6 TABLET ORAL at 08:09

## 2023-09-20 RX ADMIN — INSULIN ASPART 15 UNITS: 100 INJECTION, SOLUTION INTRAVENOUS; SUBCUTANEOUS at 11:09

## 2023-09-20 NOTE — PLAN OF CARE
Problem: Diabetic Ketoacidosis  Goal: Fluid and Electrolyte Balance with Absence of Ketosis  Outcome: Ongoing, Progressing     Problem: Diabetes Comorbidity  Goal: Blood Glucose Level Within Targeted Range  Outcome: Ongoing, Not Progressing     Problem: Fluid and Electrolyte Imbalance (Acute Kidney Injury/Impairment)  Goal: Fluid and Electrolyte Balance  Outcome: Ongoing, Not Progressing     Problem: Oral Intake Inadequate (Acute Kidney Injury/Impairment)  Goal: Optimal Nutrition Intake  Outcome: Ongoing, Progressing       Plan of care reviewed with pt. Pt aaox4. Last glucose 336. Educated pt on how to give insulin with the pen. Pt did administer her own insulin once today. Possible DC tomorrow.  Pt remained free of falls, trauma, and injury. No other concerns at this time.

## 2023-09-20 NOTE — MEDICAL/APP STUDENT
HPI: 57F with PMHx of HTN, HLD, GERD, COPD presented to the ED with elevated BG and AMS. Pt currently prescribed metformin, but has not recently taken it. On admission to the ED pt denied having HA or CP Any further history is limited by pt mental status.     ED course: afebrile, HDS. Initial labs K 4.7, Glucose 1130, bicarb 26, AG 18,  BHB 1.1, Ph 7.36, Cr 2.1, Lactate 2.7, Ca 11, AST 66, , Lipase 77. , troponin 0.085. EKG NSR @ 64 bpm with LVH associated changes. Patient received 1L NS bolus, aspirin 325 mg, KCL oral and IV, empiric vancomycin and zosyn. She was started on insulin bolus and infusion. Repeat glucose improved to 580., however, K dropped to 2.9 and insulin gtt was held while K was replaced. Pt continued on insulin gtt at 0.1u/kg/hr and now at 460. Bicarb 26 on arrival, downtrended to 14. Lactate cleared. Additionally, CTAP positive for suspected localized free intraperitoneal air adjacent to the distal descending colon. Evaluated by CRS, no emergent surgical intervention, started on cipro/flagyl.      Hospital Course: 57F with PMHx of HTN, HLD, GERD, COPD presented for AMS, found to be in DKA. Started on insulin drip and IVF. CTAP positive for suspected localized free intraperitoneal air adjacent to the distal descending colon. Evaluated by CRS, no emergent surgical intervention, started on cipro/flagyl. Patient hypertensive on presentation, BP medication adjusted. Patient transitioned to basal bolus insulin regimen and adjusted each day. Mentation gradually improved with treatment. Liver enzymes elevated on day 5. Cipro changed to cefpodoximine. RUQ US showed diffuse hepatic steatosis. Will treat with cefpodoximine and flagyl for total of 14 days. Will discharge on new HTN and DM regimen as optimized during hospitalization. PT/OT to evaluate patient today for home health vs rehab.      Interval History: No acute events overnight. Patient reports she is feeling much better. She states  that she has been eating and sleeping well. Last bowel movement was 6 days ago, despite miralax and docusate.She has noticed some itching in her hands following insulin injections and also reports a mild runny nose.      ROS: Denies vomiting, nausea, abdominal pain, chest pain, or shortness of breath. Positive for constipation and mild runny nose.     Objective:  General: alert and oriented, in no acute distress  HEENT: normocephalic, atraumatic, extraocular muscles grossly intact  Lungs: minimal end expiratory wheezing in bilateral lower lungs, upper lungs CTA bilaterally  Cardiovascular: RRR, S1 and S2 present, no murmurs  Abdomen: soft, non distended, not tender to palpation, no masses  Extremities: no lower extremity edema, 2+ posterior tibial pulses     Assessment and Plan  DKA  Patient presented with AMS and initial BG of >500. Started on insulin drip. BG now in mid 200s. Patient is now able to respond to questions but is still mildly confused and somnolent.      Plan  -increase detemir 25 BID  -increase aspart 15 with meals  -diabetic diet  -low dose sliding scale insulin     Diverticulitis  Patient presented with AMS. CT Abd showed suspected localized free intraperitoneal air adjacent to the distal descending colon, possible perforated diverticula. Patient denies abdominal pain but does report some constipation prior to presentation with dark stools. Physical exam of abdomen benign.      Plan  -continue cefpodoximine and flagyl for total of 14 day course, currently 5/14  -outpatient colonoscopy     Hypertension  Elevated BP since presentation. Patient initially unable to take oral medications due to AMS. Blood pressure improved last 132/76.     Plan  -continue losartan/HCTZ 100mg/25mg  -continue metoprolol succinate 100mg  -continue nifefipine 60mg daily   -hydralazine 50mg TID PRN for systolic BP >180     Elevated liver enzymes  , , Alk Phos 498 on 9/19. Increase from baseline. Patient denies  abdominal pain. US of abdomen consistent with diffuse hepatic steatosis. AMA negative.     Plan  -cipro changed to cefpodoximine  -daily CMP to monitor     Hypothyroidism  -continue levothyroxine 50mcg     Headache  -tylenol PRN      GERD  -continue famotidine     Hypomagnesemia  9/20 at 1.8    Plan  -replete with 400mg magnesium oxide BID    Hypophosphatemia  9/20 at 2.8    Plan  -replete with phos-nak 250mg QID    Hypokalemia  9/20 at 3.8    Plan  -replete with phos-nak 250mg QID

## 2023-09-20 NOTE — PROGRESS NOTES
[facility] - Telemetry [facility] Medicine  Progress Note    Patient Name: Olga Lopez  MRN: [id number]  Patient Class: IP- Inpatient   Admission Date: 9/15/2023  Length of Stay: 4 days  Attending Physician: Courtney Canas*  Primary Care Provider: Jonh Berg MD        Subjective:     Principal Problem:DKA (diabetic ketoacidosis)        HPI:  57F with PMHx of HTN, HLD, GERD, COPD presented to the ED with elevated BG and AMS. Pt currently prescribed metformin, but has not recently taken it. On admission to the ED pt denied having HA or CP Any further history is limited by pt mental status.    ED course: afebrile, HDS. Initial labs K 4.7, Glucose 1130, bicarb 26, AG 18,  BHB 1.1, Ph 7.36, Cr 2.1, Lactate 2.7, Ca 11, AST 66, , Lipase 77. , troponin 0.085. EKG NSR @ 64 bpm with LVH associated changes. Patient received 1L NS bolus, aspirin 325 mg, KCL oral and IV, empiric vancomycin and zosyn. She was started on insulin bolus and infusion. Repeat glucose improved to 580., however, K dropped to 2.9 and insulin gtt was held while K was replaced. Pt continued on insulin gtt at 0.1u/kg/hr and now at 460. Bicarb 26 on arrival, downtrended to 14. Lactate cleared. Additionally, CTAP positive for suspected localized free intraperitoneal air adjacent to the distal descending colon. Evaluated by CRS, no emergent surgical intervention, started on cipro/flagyl.       Overview/Hospital Course:  57F with PMHx of HTN, HLD, GERD, COPD presented for AMS, found to be in DKA. Started on insulin drip and IVF. CTAP positive for suspected localized free intraperitoneal air adjacent to the distal descending colon. Evaluated by CRS, no emergent surgical intervention, started on cipro/flagyl. Patient hypertensive on presentation, BP medication adjusted. Patient transitioned to basal bolus insulin regimen and adjusted each day. Mentation gradually improved with treatment. Liver enzymes elevated on day  5. Cipro changed to cefpodoximine. RUQ US showed diffuse hepatic steatosis. Will treat with cefpodoximine and flagyl for total of 14 days. Will discharge on new HTN and DM regimen as optimized during hospitalization.       Interval History:   Glucose still uncontrolled, increased insulin to 15 Asp/25 Det BID. Alk phos increasing 2/2 hepatic steatosis vs Ciprofloxacin. Bili wnl. Labs pending, may need to follow with hepatology outpatient.     Review of Systems  Objective:     Vital Signs (Most Recent):  Temp: 98.1 °F (36.7 °C) (09/20/23 1500)  Pulse: 77 (09/20/23 1500)  Resp: 19 (09/20/23 1500)  BP: (!) 140/77 (09/20/23 1500)  SpO2: 96 % (09/20/23 1500) Vital Signs (24h Range):  Temp:  [97.7 °F (36.5 °C)-99.1 °F (37.3 °C)] 98.1 °F (36.7 °C)  Pulse:  [64-88] 77  Resp:  [18-19] 19  SpO2:  [92 %-99 %] 96 %  BP: (126-141)/(71-77) 140/77     Weight: 91.2 kg (201 lb)  Body mass index is 37.98 kg/m².    Intake/Output Summary (Last 24 hours) at 9/20/2023 1653  Last data filed at 9/20/2023 1112  Gross per 24 hour   Intake --   Output 900 ml   Net -900 ml         Physical Exam  Constitutional:       General: She is not in acute distress.     Appearance: Normal appearance.   HENT:      Head: Normocephalic and atraumatic.   Eyes:      Extraocular Movements: Extraocular movements intact.      Pupils: Pupils are equal, round, and reactive to light.   Cardiovascular:      Rate and Rhythm: Normal rate and regular rhythm.   Pulmonary:      Effort: Pulmonary effort is normal. No respiratory distress.      Breath sounds: Normal breath sounds.   Abdominal:      General: Abdomen is flat.      Palpations: Abdomen is soft.      Tenderness: There is abdominal tenderness. There is no guarding or rebound.      Comments: Diffusely tender   Musculoskeletal:      Right lower leg: No edema.      Left lower leg: No edema.   Neurological:      General: No focal deficit present.      Mental Status: She is alert and oriented to person, place, and  time.             Significant Labs: All pertinent labs within the past 24 hours have been reviewed.    Significant Imaging: I have reviewed all pertinent imaging results/findings within the past 24 hours.      Assessment/Plan:      * DKA (diabetic ketoacidosis)  Pt with AMS, slightly improving  Blood sugar on arrival 1130 with a bicarb 26, anion gap 18, BHB 1.1.  HbA1c pending  Likely induced by medication noncompliance, abd infection/possible perforated diverticula  Home DM regimen:  metformin 500qd    Plan:  - DKA/HHS Pathway initiated  - Start insulin gtt per protocol with q1h accuchecks while on the drip  - Once Bicarb >18, Anion gap <12, Glucose <200 on 2 readings and able to tolerate PO intake without nausea and vomiting, transition to subq insulin with a 1-2 hr overlap with drip  · Long acting insulin dose:  Detemir (0.25mg/kg) daily or in 2 doses  · Short acting insulin dose:  Aspart (0.08mg/kg) units per meal  - Start normal saline at 125cc/hr.  Once blood sugar is 200, change IVF to D5 1/2 NS at 50cc/hr  - Monitor electrolytes with BMP q4h while on insulin gtt and place on telemetry  - Bariatric clear liquid diet while on insulin gtt then change to diabetic diet when initiating subq insulin with accuchecks 4x daily before meals and at bedtime (AC and HS)  - stopped insulin gtt today  - Determir 25 BID, with aspart 15 units TIDWM and ldsssi  - Diabetic diet      Transaminitis  - AST/ALT elevation  - RFs for hepatic steatosis present  - liver US showed hepatic steatosis  - Hep panel negative  - antibodies pending  - possibly from Ciprofloxacin      Diverticulitis  -CT A/P concerning for diverticulitis w/ perforation  - Colorectal consulted, surgery not indicated   - Started on IV Cipro/Flagyl  For 4 days course  - advancing diet as tolerated  - outpatient colonoscopy  - finish 10-14 d course of Cefpodoxime/flagyl       ROBERTO (acute kidney injury)  Creatinine 2.1 on admit, baseline around 0.8  - Likely  pre-renal from dehydration and volume losses  - improved with fluid resuscitation  Plan:     - Check urine lytes  - Check urine protein creatinine ratio  - Strict I&Os and daily weights   - Avoid nephrotoxic agents such as NSAIDs, gadolinium and IV radiocontrast  - Renally dose meds to current GFR  - Maintain MAP > 65  - pre-renal, resolved w/ fluids      Elevated brain natriuretic peptide (BNP) level  Pt has hx diastolic heart failure,  on admission, CXR with evidence of possible early congestion. Exam without evidence of volume overload, however pt is requiring 3L NC to maintain O2 sats >95%.     Plan  - TTE pending  - Pt on home metoprolol, lasix, losartan-HCTZ per recent dispense report  - Lasix or spironolactone prn      COPD (chronic obstructive pulmonary disease)  - pCO2 elevated to 55 on arrival with hx of COPD. No wheezing on exam, breath sounds nl, CXR without evidence of hyperinflation/air trapping    Plan  - duonebs prn  -restarted inhaler tx daily     GERD (gastroesophageal reflux disease)  IV PPI while NPO      Hyperlipidemia  Continue home Atorvastatin    Postoperative hypothyroidism  Continuing home levothyroxine 50 mcg daily      HTN (hypertension)  Recent dispense report shows most recent BP meds include metoprolol- mg, losartan-HCTZ 100-25 mg, lasix 40 mg daily    - patient more alert today, able to tolerate PO intake  - restarting home anti-hypertensives   - Nifedipine added      VTE Risk Mitigation (From admission, onward)         Ordered     enoxaparin injection 40 mg  Every 24 hours         09/18/23 1050     IP VTE HIGH RISK PATIENT  Once         09/16/23 0312     Place KADI hose  Until discontinued         09/16/23 0312     Place sequential compression device  Until discontinued         09/16/23 0312                Discharge Planning   CHRISTIANNE: 9/22/2023     Code Status: Full Code   Is the patient medically ready for discharge?: No    Reason for patient still in hospital (select all  that apply): Patient trending condition  Discharge Plan A: Home with family   Discharge Delays: None known at this time              Roz Trejo DO  Department of Hospital Medicine   [facility] - Telemetry Stepdown

## 2023-09-20 NOTE — PLAN OF CARE
Problem: Fall Injury Risk  Goal: Absence of Fall and Fall-Related Injury  Outcome: Ongoing, Progressing     Problem: Adult Inpatient Plan of Care  Goal: Plan of Care Review  Outcome: Ongoing, Progressing     Problem: Diabetic Ketoacidosis  Goal: Fluid and Electrolyte Balance with Absence of Ketosis  Outcome: Ongoing, Progressing     Problem: Diabetes Comorbidity  Goal: Blood Glucose Level Within Targeted Range  Outcome: Ongoing, Progressing     Problem: Fluid and Electrolyte Imbalance (Acute Kidney Injury/Impairment)  Goal: Fluid and Electrolyte Balance  Outcome: Ongoing, Progressing     Problem: Skin Injury Risk Increased  Goal: Skin Health and Integrity  Outcome: Ongoing, Progressing

## 2023-09-20 NOTE — PLAN OF CARE
Problem: Occupational Therapy  Goal: Occupational Therapy Goal  Description: Goals to be met by: 10/20/2023     Patient will increase functional independence with ADLs by performing:    Feeding with Modified [place].  UE Dressing with Modified [place].  LE Dressing with Modified [place].  Grooming while standing with Modified [place].  Toileting from toilet with Modified [place] for hygiene and clothing management.   Bathing from  tub bench with Modified [place].  Toilet transfer to toilet with Modified [place].    Outcome: Ongoing, Progressing    Evaluation completed. Goals are appropriate.

## 2023-09-20 NOTE — SUBJECTIVE & OBJECTIVE
Interval History:   Glucose still uncontrolled, increased insulin to 15 Asp/25 Det BID. Alk phos increasing 2/2 hepatic steatosis vs Ciprofloxacin. Bili wnl. Labs pending, may need to follow with hepatology outpatient.     Review of Systems  Objective:     Vital Signs (Most Recent):  Temp: 98.1 °F (36.7 °C) (09/20/23 1500)  Pulse: 77 (09/20/23 1500)  Resp: 19 (09/20/23 1500)  BP: (!) 140/77 (09/20/23 1500)  SpO2: 96 % (09/20/23 1500) Vital Signs (24h Range):  Temp:  [97.7 °F (36.5 °C)-99.1 °F (37.3 °C)] 98.1 °F (36.7 °C)  Pulse:  [64-88] 77  Resp:  [18-19] 19  SpO2:  [92 %-99 %] 96 %  BP: (126-141)/(71-77) 140/77     Weight: 91.2 kg (201 lb)  Body mass index is 37.98 kg/m².    Intake/Output Summary (Last 24 hours) at 9/20/2023 1653  Last data filed at 9/20/2023 1112  Gross per 24 hour   Intake --   Output 900 ml   Net -900 ml         Physical Exam  Constitutional:       General: She is not in acute distress.     Appearance: Normal appearance.   HENT:      Head: Normocephalic and atraumatic.   Eyes:      Extraocular Movements: Extraocular movements intact.      Pupils: Pupils are equal, round, and reactive to light.   Cardiovascular:      Rate and Rhythm: Normal rate and regular rhythm.   Pulmonary:      Effort: Pulmonary effort is normal. No respiratory distress.      Breath sounds: Normal breath sounds.   Abdominal:      General: Abdomen is flat.      Palpations: Abdomen is soft.      Tenderness: There is abdominal tenderness. There is no guarding or rebound.      Comments: Diffusely tender   Musculoskeletal:      Right lower leg: No edema.      Left lower leg: No edema.   Neurological:      General: No focal deficit present.      Mental Status: She is alert and oriented to person, place, and time.             Significant Labs: All pertinent labs within the past 24 hours have been reviewed.    Significant Imaging: I have reviewed all pertinent imaging results/findings within the past 24 hours.

## 2023-09-20 NOTE — ASSESSMENT & PLAN NOTE
Pt with AMS, slightly improving  Blood sugar on arrival 1130 with a bicarb 26, anion gap 18, BHB 1.1.  HbA1c pending  Likely induced by medication noncompliance, abd infection/possible perforated diverticula  Home DM regimen:  metformin 500qd    Plan:  - DKA/HHS Pathway initiated  - Start insulin gtt per protocol with q1h accuchecks while on the drip  - Once Bicarb >18, Anion gap <12, Glucose <200 on 2 readings and able to tolerate PO intake without nausea and vomiting, transition to subq insulin with a 1-2 hr overlap with drip  · Long acting insulin dose:  Detemir (0.25mg/kg) daily or in 2 doses  · Short acting insulin dose:  Aspart (0.08mg/kg) units per meal  - Start normal saline at 125cc/hr.  Once blood sugar is 200, change IVF to D5 1/2 NS at 50cc/hr  - Monitor electrolytes with BMP q4h while on insulin gtt and place on telemetry  - Bariatric clear liquid diet while on insulin gtt then change to diabetic diet when initiating subq insulin with accuchecks 4x daily before meals and at bedtime (AC and HS)  - stopped insulin gtt today  - Determir 25 BID, with aspart 15 units TIDWM and ldsssi  - Diabetic diet

## 2023-09-20 NOTE — PLAN OF CARE
Problem: Physical Therapy  Goal: Physical Therapy Goal  Description: Goals to be met by: 10/4/2023     Patient will increase functional independence with mobility by performin. Supine to sit with Set-up [place]  2. Sit to supine with Set-up [place]  3. Sit to stand transfer with Supervision  4. Bed to chair transfer with Supervision using Rolling Walker  5. Gait  x 200 feet with Supervision using Rolling Walker.   6. Ascend/descend 3 stair with Handrails Stand-by Assistance using No Assistive Device.   7. Lower extremity exercise program x15 reps per handout, with supervision    Outcome: Ongoing, Progressing

## 2023-09-20 NOTE — PT/OT/SLP EVAL
"Occupational Therapy   Evaluation    Name: Olga Lopez  MRN: [id number]  Admitting Diagnosis: DKA (diabetic ketoacidosis)  Recent Surgery: * No surgery found *      Recommendations:     Discharge Recommendations: other (see comments)  Discharge Equipment Recommendations:  walker, rolling, bedside commode  Barriers to discharge:  Decreased caregiver support    Assessment:     Olga Lopez is a 57 y.o. female with a medical diagnosis of DKA (diabetic ketoacidosis).  She presents with good motivation and participation in today's evaluation. Performance deficits affecting function: weakness, impaired endurance, impaired sensation, impaired self care skills, impaired functional mobility, impaired balance, decreased upper extremity function, pain, edema, decreased ROM.      Rehab Prognosis: Good; patient would benefit from acute skilled OT services to address these deficits and reach maximum level of function.       Plan:     Patient to be seen 3 x/week to address the above listed problems via self-care/home management, therapeutic activities, therapeutic exercises  Plan of Care Expires: 10/20/23  Plan of Care Reviewed with: patient    Subjective     Chief Complaint: "I am afraid to get up, I need a walker."  Patient/Family Comments/goals: To return home safely without falling.    Occupational Profile:  Living Environment: Pt currently lives alone in a one story home with 3 steps to enter with bilateral rails. She has a tub/shower combo but prefers to bathe in the tub. Was not driving previous to this hospitalization. She is retired from working at the [employer]. Her hobbies include yard work and watering her plants in her front yard.    Previous level of function: Independent in ADLs/IADLs and ambulation.  Equipment Used at Home: hand/wrist splint  Assistance upon Discharge: none    Pain/Comfort:  Pain Rating 1: other (see comments) (Some pain in R arm with ROM)  Location - Side 1: Right  Location - " Orientation 1: generalized  Location 1: arm  Pain Addressed 1: Reposition, Distraction, Cessation of Activity  Pain Rating Post-Intervention 1: other (see comments) (Pain was not noted after cessation of activity)    Patients cultural, spiritual, Buddhism conflicts given the current situation: no    Objective:     Communicated with: RN prior to session.  Patient found up in chair with telemetry, pulse ox (continuous) upon OT entry to room.    General Precautions: Standard, fall  Orthopedic Precautions: N/A  Braces: N/A  Respiratory Status: Room air    Occupational Performance:    Functional Mobility/Transfers:  Patient completed Sit <> Stand Transfer from bedside chair with contact guard assistance  with  rolling walker   Patient completed Toilet Transfer Step Transfer technique with contact guard assistance with  rolling walker  Functional Mobility: Pt completed functional mobility with CGA with rolling walker    Activities of Daily Living:  Upper Body Dressing: set up assistance to don hospital gown while seated in bedside chair  Lower Body Dressing: set up assistance to don socks while seated in bedside chair  Toileting: stand by assistance needed when toileting and with hygiene. Contact guard assistance needed for stability when completing transfer from toilet.    Cognitive/Visual Perceptual:  Cognitive/Psychosocial Skills:     -       Oriented to: Person, Place, Time, and Situation   -       Follows Commands/attention:Follows multistep  commands  -       Safety awareness/insight to disability: intact     Physical Exam:    Edema:  Mild in right hand and fingers  Sensation:    -       Impaired  in R hand with light/touch   Dominant hand:    -       left  Upper Extremity Range of Motion:     -       Right Upper Extremity: WFL except R shoulder flexion limited due to pain from previous fracture per patient report  -       Left Upper Extremity: WFL  Upper Extremity Strength:    -       Right Upper Extremity: not  tested due to pain   -       Left Upper Extremity: WFL   Strength:    -       Right Upper Extremity: WFL  -       Left Upper Extremity: WFL  Fine Motor Coordination:    -       Intact    AMPAC 6 Click ADL:  AMPAC Total Score: 22    Treatment & Education:  Patient completed toileting with CGA during toilet transfer for safety and balance control. Patient required SBA during toileting and hygiene for safety.     Patient was educated on safe transfer skills and hand placement when using a rolling walker to stand from sitting as well as during toilet transfers.     Provided education regarding role of OT, POC, & discharge recommendations with pt  verbalizing understanding.  Pt had no further questions & when asked whether there were any concerns pt reported none.      Patient left up in chair with all lines intact, call button in reach, and RN notified    GOALS:   Multidisciplinary Problems       Occupational Therapy Goals          Problem: Occupational Therapy    Goal Priority Disciplines Outcome Interventions   Occupational Therapy Goal     OT, PT/OT Ongoing, Progressing    Description: Goals to be met by: 10/20/2023     Patient will increase functional independence with ADLs by performing:    Feeding with Modified [place].  UE Dressing with Modified [place].  LE Dressing with Modified [place].  Grooming while standing with Modified [place].  Toileting from toilet with Modified [place] for hygiene and clothing management.   Bathing from  tub bench with Modified [place].  Toilet transfer to toilet with Modified [place].                         History:     Past Medical History:   Diagnosis Date    COPD (chronic obstructive pulmonary disease)     GERD (gastroesophageal reflux disease)     Hypertension     Seasonal allergies          Past Surgical History:   Procedure Laterality Date    bladder lift  1994    BREAST SURGERY      EXCISION OF LESION OF THYROID      EYE SURGERY Right  1968    HYSTERECTOMY      OPEN REDUCTION AND INTERNAL FIXATION (ORIF) OF FRACTURE OF OLECRANON PROCESS OF ULNA Right 9/30/2020    Procedure: ORIF, FRACTURE, OLECRANON;  Surgeon: Luis Davidson MD;  Location: [facility];  Service: Orthopedics;  Laterality: Right;  too instruments, k-wire and cable     C-ARM    R Breast Mass Removal Right 02/27/2020    benign    REDUCTION OF BOTH BREASTS      REMOVAL OF HARDWARE FROM UPPER EXTREMITY  05/10/2021    right arm. removal orthopedic hardware     TUBAL LIGATION         Time Tracking:     OT Date of Treatment: 09/20/23  OT Start Time: 1029  OT Stop Time: 1106  OT Total Time (min): 37 min    Billable Minutes:Evaluation 16  Self Care/Home Management 21    9/20/2023

## 2023-09-20 NOTE — ASSESSMENT & PLAN NOTE
- AST/ALT elevation  - RFs for hepatic steatosis present  - liver US showed hepatic steatosis  - Hep panel negative  - antibodies pending  - possibly from Ciprofloxacin

## 2023-09-20 NOTE — PT/OT/SLP EVAL
Physical Therapy Evaluation    Patient Name:  Olga Lopez   MRN:  [id number]    Recommendations:     Discharge Recommendations: other (see comments)   Discharge Equipment Recommendations: bedside commode, walker, rolling   Barriers to discharge: None    Assessment:     Olga Lopez is a 57 y.o. female admitted with a medical diagnosis of DKA (diabetic ketoacidosis).  She presents with the following impairments/functional limitations: weakness, impaired endurance, impaired self care skills, impaired functional mobility, gait instability, impaired balance Pt. cooperative and tolerated treatment well. Pt. progressing with mobility. Pt. would benefit from continued PT at home upon discharge.    Rehab Prognosis: Good; patient would benefit from acute skilled PT services to address these deficits and reach maximum level of function.    Recent Surgery: * No surgery found *      Plan:     During this hospitalization, patient to be seen 3 x/week to address the identified rehab impairments via gait training, therapeutic activities, therapeutic exercises and progress toward the following goals:    Plan of Care Expires:  10/20/23    Subjective     Chief Complaint: weakness  Patient/Family Comments/goals: pt. Agreeable to PT  Pain/Comfort:  Pain Rating 1: 0/10  Pain Rating Post-Intervention 1: 0/10    Patients cultural, spiritual, Anabaptism conflicts given the current situation: no    Living Environment:  Pt. Lives alone in [facility] with 3 ROWDY and (B) handrails  Prior to admission, patients level of function was indep.  Equipment used at home: none.  Upon discharge, patient will not have assistance.    Objective:     Communicated with nursing prior to session.  Patient found up in chair with pulse ox (continuous), telemetry, peripheral IV  upon PT entry to room.    General Precautions: Standard, fall  Orthopedic Precautions:N/A   Braces: N/A  Respiratory Status: Room air    Exams:  RLE ROM: WFL  RLE Strength:  WFL  LLE ROM: WFL  LLE Strength: WFL    Functional Mobility:  Bed Mobility:     Sit to Supine: stand by assistance  Transfers:     Sit to Stand:  stand by assistance with rolling walker  Gait: 100' with RW and CGA-SBA with decreased step length/jt  Balance: fair      AM-PAC 6 CLICK MOBILITY  Total Score:18       Treatment & Education:  Discussed therapy needs, goals, and POC.    Patient left supine with all lines intact and call button in reach.    GOALS:   Multidisciplinary Problems       Physical Therapy Goals          Problem: Physical Therapy    Goal Priority Disciplines Outcome Goal Variances Interventions   Physical Therapy Goal     PT, PT/OT Ongoing, Progressing     Description: Goals to be met by: 10/4/2023     Patient will increase functional independence with mobility by performin. Supine to sit with Set-up [place]  2. Sit to supine with Set-up [place]  3. Sit to stand transfer with Supervision  4. Bed to chair transfer with Supervision using Rolling Walker  5. Gait  x 200 feet with Supervision using Rolling Walker.   6. Ascend/descend 3 stair with Handrails Stand-by Assistance using No Assistive Device.   7. Lower extremity exercise program x15 reps per handout, with supervision                         History:     Past Medical History:   Diagnosis Date    COPD (chronic obstructive pulmonary disease)     GERD (gastroesophageal reflux disease)     Hypertension     Seasonal allergies        Past Surgical History:   Procedure Laterality Date    bladder lift      BREAST SURGERY      EXCISION OF LESION OF THYROID      EYE SURGERY Right     HYSTERECTOMY      OPEN REDUCTION AND INTERNAL FIXATION (ORIF) OF FRACTURE OF OLECRANON PROCESS OF ULNA Right 2020    Procedure: ORIF, FRACTURE, OLECRANON;  Surgeon: Luis Davidson MD;  Location: [facility] OR;  Service: Orthopedics;  Laterality: Right;  too instruments, k-wire and cable     C-ARM    R Breast Mass Removal Right 2020     benign    REDUCTION OF BOTH BREASTS      REMOVAL OF HARDWARE FROM UPPER EXTREMITY  05/10/2021    right arm. removal orthopedic hardware     TUBAL LIGATION         Time Tracking:     PT Received On: 09/20/23  PT Start Time: 1345     PT Stop Time: 1354  PT Total Time (min): 9 min     Billable Minutes: Evaluation 9      09/20/2023

## 2023-09-21 ENCOUNTER — TELEPHONE (OUTPATIENT)
Dept: OPHTHALMOLOGY | Facility: CLINIC | Age: 58
End: 2023-09-21
Payer: COMMERCIAL

## 2023-09-21 VITALS
TEMPERATURE: 98 F | SYSTOLIC BLOOD PRESSURE: 111 MMHG | OXYGEN SATURATION: 95 % | BODY MASS INDEX: 37.95 KG/M2 | HEART RATE: 66 BPM | DIASTOLIC BLOOD PRESSURE: 65 MMHG | RESPIRATION RATE: 19 BRPM | HEIGHT: 61 IN | WEIGHT: 201 LBS

## 2023-09-21 PROBLEM — H40.9 GLAUCOMA: Chronic | Status: ACTIVE | Noted: 2023-09-21

## 2023-09-21 LAB
ALBUMIN SERPL BCP-MCNC: 2.5 G/DL (ref 3.5–5.2)
ALP SERPL-CCNC: 519 U/L (ref 55–135)
ALT SERPL W/O P-5'-P-CCNC: 210 U/L (ref 10–44)
ANION GAP SERPL CALC-SCNC: 7 MMOL/L (ref 8–16)
AST SERPL-CCNC: 102 U/L (ref 10–40)
BILIRUB SERPL-MCNC: 0.6 MG/DL (ref 0.1–1)
BUN SERPL-MCNC: 12 MG/DL (ref 6–20)
CALCIUM SERPL-MCNC: 9.5 MG/DL (ref 8.7–10.5)
CHLORIDE SERPL-SCNC: 99 MMOL/L (ref 95–110)
CO2 SERPL-SCNC: 30 MMOL/L (ref 23–29)
CREAT SERPL-MCNC: 0.8 MG/DL (ref 0.5–1.4)
EST. GFR  (NO RACE VARIABLE): >60 ML/MIN/1.73 M^2
GLUCOSE SERPL-MCNC: 179 MG/DL (ref 70–110)
POCT GLUCOSE: 138 MG/DL (ref 70–110)
POCT GLUCOSE: 224 MG/DL (ref 70–110)
POCT GLUCOSE: 241 MG/DL (ref 70–110)
POTASSIUM SERPL-SCNC: 3.5 MMOL/L (ref 3.5–5.1)
PROT SERPL-MCNC: 6.3 G/DL (ref 6–8.4)
SODIUM SERPL-SCNC: 136 MMOL/L (ref 136–145)

## 2023-09-21 PROCEDURE — 25000003 PHARM REV CODE 250

## 2023-09-21 PROCEDURE — 99239 PR HOSPITAL DISCHARGE DAY,>30 MIN: ICD-10-PCS | Mod: ,,, | Performed by: STUDENT IN AN ORGANIZED HEALTH CARE EDUCATION/TRAINING PROGRAM

## 2023-09-21 PROCEDURE — 36415 COLL VENOUS BLD VENIPUNCTURE: CPT

## 2023-09-21 PROCEDURE — 99239 HOSP IP/OBS DSCHRG MGMT >30: CPT | Mod: ,,, | Performed by: STUDENT IN AN ORGANIZED HEALTH CARE EDUCATION/TRAINING PROGRAM

## 2023-09-21 PROCEDURE — 80053 COMPREHEN METABOLIC PANEL: CPT

## 2023-09-21 PROCEDURE — 63600175 PHARM REV CODE 636 W HCPCS: Performed by: STUDENT IN AN ORGANIZED HEALTH CARE EDUCATION/TRAINING PROGRAM

## 2023-09-21 PROCEDURE — 94761 N-INVAS EAR/PLS OXIMETRY MLT: CPT

## 2023-09-21 PROCEDURE — 63600175 PHARM REV CODE 636 W HCPCS

## 2023-09-21 RX ORDER — SEMAGLUTIDE 0.68 MG/ML
0.25 INJECTION, SOLUTION SUBCUTANEOUS
Qty: 3 ML | Refills: 5 | Status: SHIPPED | OUTPATIENT
Start: 2023-09-21 | End: 2023-09-21 | Stop reason: HOSPADM

## 2023-09-21 RX ORDER — LATANOPROST 50 UG/ML
1 SOLUTION/ DROPS OPHTHALMIC NIGHTLY
Qty: 2.5 ML | Refills: 0 | OUTPATIENT
Start: 2023-09-21 | End: 2024-09-20

## 2023-09-21 RX ORDER — INSULIN GLARGINE 300 [IU]/ML
30 INJECTION, SOLUTION SUBCUTANEOUS 2 TIMES DAILY
Qty: 6 ML | Refills: 11 | Status: SHIPPED | OUTPATIENT
Start: 2023-09-21 | End: 2023-09-21 | Stop reason: HOSPADM

## 2023-09-21 RX ORDER — NIFEDIPINE 60 MG/1
60 TABLET, EXTENDED RELEASE ORAL DAILY
Qty: 30 TABLET | Refills: 11 | Status: SHIPPED | OUTPATIENT
Start: 2023-09-22 | End: 2024-09-21

## 2023-09-21 RX ORDER — INSULIN GLARGINE 300 [IU]/ML
30 INJECTION, SOLUTION SUBCUTANEOUS 2 TIMES DAILY
Qty: 18 ML | Refills: 3 | OUTPATIENT
Start: 2023-09-21 | End: 2024-09-20

## 2023-09-21 RX ORDER — INSULIN DETEMIR 100 [IU]/ML
30 INJECTION, SOLUTION SUBCUTANEOUS 2 TIMES DAILY
Qty: 54 ML | Refills: 3 | Status: SHIPPED | OUTPATIENT
Start: 2023-09-21 | End: 2024-09-20

## 2023-09-21 RX ORDER — POTASSIUM CHLORIDE 20 MEQ/1
40 TABLET, EXTENDED RELEASE ORAL ONCE
Status: COMPLETED | OUTPATIENT
Start: 2023-09-21 | End: 2023-09-21

## 2023-09-21 RX ORDER — ATORVASTATIN CALCIUM 40 MG/1
40 TABLET, FILM COATED ORAL DAILY
Qty: 90 TABLET | Refills: 3 | Status: SHIPPED | OUTPATIENT
Start: 2023-09-21 | End: 2024-09-20

## 2023-09-21 RX ORDER — INSULIN ASPART 100 [IU]/ML
19 INJECTION, SOLUTION INTRAVENOUS; SUBCUTANEOUS 3 TIMES DAILY
Refills: 0 | OUTPATIENT
Start: 2023-09-21 | End: 2024-09-20

## 2023-09-21 RX ORDER — INSULIN ASPART 100 [IU]/ML
19 INJECTION, SOLUTION INTRAVENOUS; SUBCUTANEOUS 3 TIMES DAILY
Qty: 30 ML | Refills: 4 | Status: SHIPPED | OUTPATIENT
Start: 2023-09-21 | End: 2023-09-21 | Stop reason: HOSPADM

## 2023-09-21 RX ORDER — FAMOTIDINE 20 MG/1
20 TABLET, FILM COATED ORAL 2 TIMES DAILY
Qty: 60 TABLET | Refills: 11 | Status: SHIPPED | OUTPATIENT
Start: 2023-09-21 | End: 2024-09-20

## 2023-09-21 RX ORDER — POLYETHYLENE GLYCOL 3350 17 G/17G
17 POWDER, FOR SOLUTION ORAL 2 TIMES DAILY
Qty: 24 EACH | Refills: 0 | OUTPATIENT
Start: 2023-09-21

## 2023-09-21 RX ORDER — LATANOPROST 50 UG/ML
1 SOLUTION/ DROPS OPHTHALMIC NIGHTLY
Qty: 2.5 ML | Refills: 3 | Status: SHIPPED | OUTPATIENT
Start: 2023-09-21 | End: 2024-09-20

## 2023-09-21 RX ORDER — CEFPODOXIME PROXETIL 200 MG/1
200 TABLET, FILM COATED ORAL EVERY 12 HOURS
Qty: 9 TABLET | Refills: 0 | Status: SHIPPED | OUTPATIENT
Start: 2023-09-21 | End: 2023-09-26

## 2023-09-21 RX ORDER — METRONIDAZOLE 500 MG/1
500 TABLET ORAL EVERY 8 HOURS
Qty: 27 TABLET | Refills: 0 | OUTPATIENT
Start: 2023-09-21 | End: 2023-09-30

## 2023-09-21 RX ORDER — ONDANSETRON 4 MG/1
8 TABLET, ORALLY DISINTEGRATING ORAL EVERY 8 HOURS PRN
Qty: 120 TABLET | Refills: 2 | Status: SHIPPED | OUTPATIENT
Start: 2023-09-21 | End: 2024-03-24

## 2023-09-21 RX ORDER — CETIRIZINE HYDROCHLORIDE 10 MG/1
10 TABLET ORAL DAILY
Qty: 30 TABLET | Refills: 11 | Status: SHIPPED | OUTPATIENT
Start: 2023-09-21 | End: 2024-09-20

## 2023-09-21 RX ORDER — INSULIN PUMP SYRINGE, 3 ML
EACH MISCELLANEOUS
Qty: 1 EACH | Refills: 0 | OUTPATIENT
Start: 2023-09-21 | End: 2024-09-20

## 2023-09-21 RX ORDER — METFORMIN HYDROCHLORIDE 500 MG/1
TABLET, EXTENDED RELEASE ORAL
Qty: 270 TABLET | Refills: 1 | Status: SHIPPED | OUTPATIENT
Start: 2023-09-21

## 2023-09-21 RX ORDER — METRONIDAZOLE 500 MG/1
500 TABLET ORAL EVERY 8 HOURS
Qty: 13 TABLET | Refills: 0 | Status: SHIPPED | OUTPATIENT
Start: 2023-09-21 | End: 2023-09-26

## 2023-09-21 RX ORDER — NIFEDIPINE 60 MG/1
60 TABLET, EXTENDED RELEASE ORAL DAILY
Qty: 30 TABLET | Refills: 11 | OUTPATIENT
Start: 2023-09-22 | End: 2024-09-21

## 2023-09-21 RX ORDER — POLYETHYLENE GLYCOL 3350 17 G/17G
17 POWDER, FOR SOLUTION ORAL 2 TIMES DAILY
Qty: 476 G | Refills: 0 | Status: SHIPPED | OUTPATIENT
Start: 2023-09-21

## 2023-09-21 RX ORDER — INSULIN ASPART 100 [IU]/ML
19 INJECTION, SOLUTION INTRAVENOUS; SUBCUTANEOUS
Status: DISCONTINUED | OUTPATIENT
Start: 2023-09-21 | End: 2023-09-22 | Stop reason: HOSPADM

## 2023-09-21 RX ORDER — CEFPODOXIME PROXETIL 200 MG/1
200 TABLET, FILM COATED ORAL EVERY 12 HOURS
Qty: 18 TABLET | Refills: 0 | OUTPATIENT
Start: 2023-09-21 | End: 2023-09-30

## 2023-09-21 RX ADMIN — Medication 400 MG: at 08:09

## 2023-09-21 RX ADMIN — LEVOTHYROXINE SODIUM 50 MCG: 50 TABLET ORAL at 06:09

## 2023-09-21 RX ADMIN — NIFEDIPINE 60 MG: 30 TABLET, FILM COATED, EXTENDED RELEASE ORAL at 08:09

## 2023-09-21 RX ADMIN — LOSARTAN POTASSIUM AND HYDROCHLOROTHIAZIDE 1 TABLET: 100; 25 TABLET, FILM COATED ORAL at 08:09

## 2023-09-21 RX ADMIN — INSULIN ASPART 15 UNITS: 100 INJECTION, SOLUTION INTRAVENOUS; SUBCUTANEOUS at 08:09

## 2023-09-21 RX ADMIN — METRONIDAZOLE 500 MG: 500 TABLET ORAL at 06:09

## 2023-09-21 RX ADMIN — INSULIN ASPART 4 UNITS: 100 INJECTION, SOLUTION INTRAVENOUS; SUBCUTANEOUS at 08:09

## 2023-09-21 RX ADMIN — SENNOSIDES AND DOCUSATE SODIUM 2 TABLET: 50; 8.6 TABLET ORAL at 08:09

## 2023-09-21 RX ADMIN — METRONIDAZOLE 500 MG: 500 TABLET ORAL at 01:09

## 2023-09-21 RX ADMIN — ALBUTEROL SULFATE 2 PUFF: 108 INHALANT RESPIRATORY (INHALATION) at 10:09

## 2023-09-21 RX ADMIN — INSULIN DETEMIR 15 UNITS: 100 INJECTION, SOLUTION SUBCUTANEOUS at 09:09

## 2023-09-21 RX ADMIN — INSULIN ASPART 4 UNITS: 100 INJECTION, SOLUTION INTRAVENOUS; SUBCUTANEOUS at 12:09

## 2023-09-21 RX ADMIN — CEFPODOXIME PROXETIL 200 MG: 200 TABLET, FILM COATED ORAL at 08:09

## 2023-09-21 RX ADMIN — POTASSIUM CHLORIDE 40 MEQ: 1500 TABLET, EXTENDED RELEASE ORAL at 01:09

## 2023-09-21 RX ADMIN — METOPROLOL SUCCINATE 100 MG: 100 TABLET, EXTENDED RELEASE ORAL at 08:09

## 2023-09-21 RX ADMIN — FAMOTIDINE 20 MG: 20 TABLET ORAL at 08:09

## 2023-09-21 RX ADMIN — INSULIN ASPART 19 UNITS: 100 INJECTION, SOLUTION INTRAVENOUS; SUBCUTANEOUS at 12:09

## 2023-09-21 RX ADMIN — POLYETHYLENE GLYCOL 3350 17 G: 17 POWDER, FOR SOLUTION ORAL at 08:09

## 2023-09-21 RX ADMIN — ENOXAPARIN SODIUM 40 MG: 40 INJECTION SUBCUTANEOUS at 05:09

## 2023-09-21 RX ADMIN — INSULIN ASPART 15 UNITS: 100 INJECTION, SOLUTION INTRAVENOUS; SUBCUTANEOUS at 05:09

## 2023-09-21 NOTE — TELEPHONE ENCOUNTER
"----- Message from Oscar Jurado sent at 9/21/2023  1:49 PM CDT -----  Scheduling Request        Patient Status: Np      Scheduling Appt: From referral [E11.9 (ICD-10-CM) - Diabetes  H40.9 (ICD-10-CM) - Glaucoma, unspecified glaucoma type, unspecified laterality]      Time/Date Preference: Soonest available      Contact Preference?:  [phone] (Mobile)      Treating Provider: April       Additional Notes:  "Thank you for all that you do for our patients"      "

## 2023-09-21 NOTE — PLAN OF CARE
Problem: Fall Injury Risk  Goal: Absence of Fall and Fall-Related Injury  Outcome: Ongoing, Progressing     Problem: Adult Inpatient Plan of Care  Goal: Plan of Care Review  Outcome: Ongoing, Progressing  Goal: Patient-Specific Goal (Individualized)  Outcome: Ongoing, Progressing  Goal: Absence of Hospital-Acquired Illness or Injury  Outcome: Ongoing, Progressing  Goal: Optimal Comfort and Wellbeing  Outcome: Ongoing, Progressing  Goal: Readiness for Transition of Care  Outcome: Ongoing, Progressing     Problem: Diabetic Ketoacidosis  Goal: Fluid and Electrolyte Balance with Absence of Ketosis  Outcome: Ongoing, Progressing     Problem: Diabetes Comorbidity  Goal: Blood Glucose Level Within Targeted Range  Outcome: Ongoing, Progressing     Problem: Fluid and Electrolyte Imbalance (Acute Kidney Injury/Impairment)  Goal: Fluid and Electrolyte Balance  Outcome: Ongoing, Progressing     Problem: Oral Intake Inadequate (Acute Kidney Injury/Impairment)  Goal: Optimal Nutrition Intake  Outcome: Ongoing, Progressing     Problem: Renal Function Impairment (Acute Kidney Injury/Impairment)  Goal: Effective Renal Function  Outcome: Ongoing, Progressing     Problem: Skin Injury Risk Increased  Goal: Skin Health and Integrity  Outcome: Ongoing, Progressing

## 2023-09-21 NOTE — PLAN OF CARE
CHW scheduled pt's hospital f/u visit on 9/28/23 @ 3:00 pm and Podiatry 9/25/23 @ 10:00 am.  Ophthalmology will make contact with pt to schedule her hospital f/u appointment.

## 2023-09-21 NOTE — ASSESSMENT & PLAN NOTE
-CT A/P concerning for diverticulitis w/ perforation  - Colorectal consulted, surgery not indicated   - Started on IV Cipro/Flagyl  For 4 days course  - advancing diet as tolerated  - outpatient colonoscopy  - finish 14 d course of Cefpodoxime/flagyl

## 2023-09-21 NOTE — PLAN OF CARE
Problem: Fall Injury Risk  Goal: Absence of Fall and Fall-Related Injury  Outcome: Met     Problem: Adult Inpatient Plan of Care  Goal: Plan of Care Review  Outcome: Met  Goal: Patient-Specific Goal (Individualized)  Outcome: Met  Goal: Absence of Hospital-Acquired Illness or Injury  Outcome: Met  Goal: Optimal Comfort and Wellbeing  Outcome: Met  Goal: Readiness for Transition of Care  Outcome: Met     Problem: Diabetic Ketoacidosis  Goal: Fluid and Electrolyte Balance with Absence of Ketosis  Outcome: Met     Problem: Diabetes Comorbidity  Goal: Blood Glucose Level Within Targeted Range  Outcome: Met     Problem: Fluid and Electrolyte Imbalance (Acute Kidney Injury/Impairment)  Goal: Fluid and Electrolyte Balance  Outcome: Met     Problem: Oral Intake Inadequate (Acute Kidney Injury/Impairment)  Goal: Optimal Nutrition Intake  Outcome: Met     Problem: Renal Function Impairment (Acute Kidney Injury/Impairment)  Goal: Effective Renal Function  Outcome: Met     Problem: Skin Injury Risk Increased  Goal: Skin Health and Integrity  Outcome: Met

## 2023-09-21 NOTE — PROGRESS NOTES
[facility] - Telemetry [facility] Medicine  Progress Note    Patient Name: Olga Lopez  MRN: [id number]  Patient Class: IP- Inpatient   Admission Date: 9/15/2023  Length of Stay: 5 days  Attending Physician: Hugh Rondon MD  Primary Care Provider: Jonh Berg MD        Subjective:     Principal Problem:DKA (diabetic ketoacidosis)        HPI:  57F with PMHx of HTN, HLD, GERD, COPD presented to the ED with elevated BG and AMS. Pt currently prescribed metformin, but has not recently taken it. On admission to the ED pt denied having HA or CP Any further history is limited by pt mental status.    ED course: afebrile, HDS. Initial labs K 4.7, Glucose 1130, bicarb 26, AG 18,  BHB 1.1, Ph 7.36, Cr 2.1, Lactate 2.7, Ca 11, AST 66, , Lipase 77. , troponin 0.085. EKG NSR @ 64 bpm with LVH associated changes. Patient received 1L NS bolus, aspirin 325 mg, KCL oral and IV, empiric vancomycin and zosyn. She was started on insulin bolus and infusion. Repeat glucose improved to 580., however, K dropped to 2.9 and insulin gtt was held while K was replaced. Pt continued on insulin gtt at 0.1u/kg/hr and now at 460. Bicarb 26 on arrival, downtrended to 14. Lactate cleared. Additionally, CTAP positive for suspected localized free intraperitoneal air adjacent to the distal descending colon. Evaluated by CRS, no emergent surgical intervention, started on cipro/flagyl.       Overview/Hospital Course:    Hospital Course: 57F with PMHx of HTN, HLD, GERD, COPD presented for AMS, found to be in DKA. Started on insulin drip and IVF. CTAP positive for suspected localized free intraperitoneal air adjacent to the distal descending colon. Evaluated by CRS, no emergent surgical intervention, started on cipro/flagyl. Patient hypertensive on presentation, BP medication adjusted. Patient transitioned to basal bolus insulin regimen and adjusted each day. Mentation gradually improved with treatment. Liver enzymes  elevated on day 5. Cipro changed to cefpodoximine. RUQ US showed diffuse hepatic steatosis. Will treat with cefpodoximine and flagyl for total of 14 days. Will discharge on new HTN and DM regimen as optimized during hospitalization. PT/OT evaluated patient and she will be discharged with home health PT/OT, along with rolling walker and bedside commode.       Interval History:   Started on Semaglutide for ESPINO. Continue Metformin and Levemir 30 BID. Recommended blood sugar logs before meals and at night. Should follow-up with endocrinology outpatient. Stable for DC. Continue abx for 14 day course for diverticulitis.     Review of Systems  Objective:     Vital Signs (Most Recent):  Temp: 98.2 °F (36.8 °C) (09/21/23 1554)  Pulse: 66 (09/21/23 1554)  Resp: 19 (09/21/23 1554)  BP: 111/65 (09/21/23 1554)  SpO2: (!) 94 % (09/21/23 1554) Vital Signs (24h Range):  Temp:  [97.9 °F (36.6 °C)-99.3 °F (37.4 °C)] 98.2 °F (36.8 °C)  Pulse:  [57-89] 66  Resp:  [18-19] 19  SpO2:  [94 %-100 %] 94 %  BP: (106-141)/(57-96) 111/65     Weight: 91.2 kg (201 lb)  Body mass index is 37.98 kg/m².    Intake/Output Summary (Last 24 hours) at 9/21/2023 1620  Last data filed at 9/20/2023 2242  Gross per 24 hour   Intake 200 ml   Output --   Net 200 ml         Physical Exam  Constitutional:       General: She is not in acute distress.     Appearance: Normal appearance.   HENT:      Head: Normocephalic and atraumatic.   Eyes:      Extraocular Movements: Extraocular movements intact.      Pupils: Pupils are equal, round, and reactive to light.   Cardiovascular:      Rate and Rhythm: Normal rate and regular rhythm.   Pulmonary:      Effort: Pulmonary effort is normal. No respiratory distress.      Breath sounds: Normal breath sounds.   Abdominal:      General: Abdomen is flat.      Palpations: Abdomen is soft.      Tenderness: There is abdominal tenderness. There is no guarding or rebound.      Comments: Diffusely tender   Musculoskeletal:       Right lower leg: No edema.      Left lower leg: No edema.   Neurological:      General: No focal deficit present.      Mental Status: She is alert and oriented to person, place, and time.             Significant Labs: All pertinent labs within the past 24 hours have been reviewed.    Significant Imaging: I have reviewed all pertinent imaging results/findings within the past 24 hours.      Assessment/Plan:      * DKA (diabetic ketoacidosis)  Pt with AMS, slightly improving  Blood sugar on arrival 1130 with a bicarb 26, anion gap 18, BHB 1.1.  HbA1c pending  Likely induced by medication noncompliance, abd infection/possible perforated diverticula  Home DM regimen:  metformin 500qd    Plan:  - DKA/HHS Pathway initiated  - Start insulin gtt per protocol with q1h accuchecks while on the drip  - Once Bicarb >18, Anion gap <12, Glucose <200 on 2 readings and able to tolerate PO intake without nausea and vomiting, transition to subq insulin with a 1-2 hr overlap with drip  · Long acting insulin dose:  Detemir (0.25mg/kg) daily or in 2 doses  · Short acting insulin dose:  Aspart (0.08mg/kg) units per meal  - Start normal saline at 125cc/hr.  Once blood sugar is 200, change IVF to D5 1/2 NS at 50cc/hr  - Monitor electrolytes with BMP q4h while on insulin gtt and place on telemetry  - Bariatric clear liquid diet while on insulin gtt then change to diabetic diet when initiating subq insulin with accuchecks 4x daily before meals and at bedtime (AC and HS)  - stopped insulin gtt today  - Determir 25 BID, with aspart 15 units TIDWM and ldsssi  - DC on 30 Levemir BID and Metformin  - Diabetic diet      Transaminitis  - AST/ALT elevation  - RFs for hepatic steatosis present  - liver US showed hepatic steatosis  - Hep panel negative  - antibodies pending  - possibly from Ciprofloxacin      Diverticulitis  -CT A/P concerning for diverticulitis w/ perforation  - Colorectal consulted, surgery not indicated   - Started on IV Cipro/Flagyl   For 4 days course  - advancing diet as tolerated  - outpatient colonoscopy  - finish 14 d course of Cefpodoxime/flagyl       ROBERTO (acute kidney injury)  Creatinine 2.1 on admit, baseline around 0.8  - Likely pre-renal from dehydration and volume losses  - improved with fluid resuscitation  Plan:     - Check urine lytes  - Check urine protein creatinine ratio  - Strict I&Os and daily weights   - Avoid nephrotoxic agents such as NSAIDs, gadolinium and IV radiocontrast  - Renally dose meds to current GFR  - Maintain MAP > 65  - pre-renal, resolved w/ fluids      Elevated brain natriuretic peptide (BNP) level  Pt has hx diastolic heart failure,  on admission, CXR with evidence of possible early congestion. Exam without evidence of volume overload, however pt is requiring 3L NC to maintain O2 sats >95%.     Plan  - TTE pending  - Pt on home metoprolol, lasix, losartan-HCTZ per recent dispense report  - Lasix or spironolactone prn      COPD (chronic obstructive pulmonary disease)  - pCO2 elevated to 55 on arrival with hx of COPD. No wheezing on exam, breath sounds nl, CXR without evidence of hyperinflation/air trapping    Plan  - duonebs prn  -restarted inhaler tx daily     GERD (gastroesophageal reflux disease)  IV PPI while NPO      Hyperlipidemia  Continue home Atorvastatin    Postoperative hypothyroidism  Continuing home levothyroxine 50 mcg daily      HTN (hypertension)  Recent dispense report shows most recent BP meds include metoprolol- mg, losartan-HCTZ 100-25 mg, lasix 40 mg daily    - patient more alert today, able to tolerate PO intake  - restarting home anti-hypertensives   - Nifedipine added      VTE Risk Mitigation (From admission, onward)         Ordered     enoxaparin injection 40 mg  Every 24 hours         09/18/23 1050     IP VTE HIGH RISK PATIENT  Once         09/16/23 0312     Place KADI hose  Until discontinued         09/16/23 0312     Place sequential compression device  Until  discontinued         09/16/23 0312                Discharge Planning   CHRISTIANNE: 9/21/2023     Code Status: Full Code   Is the patient medically ready for discharge?: No    Reason for patient still in hospital (select all that apply): Pending disposition  Discharge Plan A: Home with family   Discharge Delays: None known at this time              Roz Trejo DO  Department of Hospital Medicine   [facility] - Telemetry Stepdown

## 2023-09-21 NOTE — PLAN OF CARE
[facility] - Telemetry Stepdown      HOME HEALTH ORDERS  FACE TO FACE ENCOUNTER    Patient Name: Olga Lopez  YOB: 1965    PCP: Jonh Berg MD   PCP Address: [address] [place] [address] / [place]  PCP Phone Number: [phone]  PCP Fax: [phone]    Encounter Date: 9/15/23    Admit to Home Health    Diagnoses:  Active Hospital Problems    Diagnosis  POA    *DKA (diabetic ketoacidosis) [E11.10]  Yes    Bilateral renal masses [N28.89]  Yes    Transaminitis [R74.01]  Unknown    Elevated brain natriuretic peptide (BNP) level [R79.89]  Yes    ROBERTO (acute kidney injury) [N17.9]  Yes    Diverticulitis [K57.92]  Unknown    COPD (chronic obstructive pulmonary disease) [J44.9]  Yes     Chronic    GERD (gastroesophageal reflux disease) [K21.9]  Yes     Chronic    Hyperlipidemia [E78.5]  Yes     Chronic    Postoperative hypothyroidism [E89.0]  Yes    HTN (hypertension) [I10]  No     Chronic      Resolved Hospital Problems   No resolved problems to display.       Follow Up Appointments:  Future Appointments   Date Time Provider Department Center   9/25/2023 10:00 AM Joaquin Charles DPM [facility]   10/18/2023  8:00 AM Mónica Hernández OD [facility] [place]       Allergies:  Review of patient's allergies indicates:   Allergen Reactions    Vibramycin [doxycycline calcium] Hives    Codeine     Pcn [penicillins]     Sulfa (sulfonamide antibiotics)     Tetracyclines        Medications: Review discharge medications with patient and family and provide education.    Current Facility-Administered Medications   Medication Dose Route Frequency Provider Last Rate Last Admin    albuterol inhaler 2 puff  2 puff Inhalation Daily Shanita Chen MD   2 puff at 09/21/23 1000    cefpodoxime tablet 200 mg  200 mg Oral Q12H Shanita Chen MD   200 mg at 09/21/23 0837    dextrose 10% bolus 125 mL 125 mL  12.5 g Intravenous PRN Shanita Chen MD        dextrose 10% bolus 250 mL 250 mL  25 g  Intravenous PRN Shanita Chen MD        enoxaparin injection 40 mg  40 mg Subcutaneous Q24H (prophylaxis, 1700) Roz Trejo DO   40 mg at 09/20/23 1642    famotidine tablet 20 mg  20 mg Oral BID Roz Trejo DO   20 mg at 09/21/23 0837    glucagon (human recombinant) injection 1 mg  1 mg Intramuscular PRN Shanita Chen MD        glucose chewable tablet 16 g  16 g Oral PRN Shanita Chen MD        glucose chewable tablet 24 g  24 g Oral PRN Shanita Chen MD        hydrALAZINE tablet 50 mg  50 mg Oral Q8H PRN Shanita Chen MD   50 mg at 09/17/23 2103    insulin aspart U-100 pen 0-10 Units  0-10 Units Subcutaneous QID (AC + HS) PRN Shanita Chen MD   4 Units at 09/21/23 1224    insulin aspart U-100 pen 19 Units  19 Units Subcutaneous TIDWM Hugh Rondon MD   19 Units at 09/21/23 1224    insulin detemir U-100 (Levemir) pen 30 Units  30 Units Subcutaneous BID Hugh Rondon MD        insulin detemir U-100 (Levemir) pen 5 Units  5 Units Subcutaneous Once Hugh Rondon MD        latanoprost 0.005 % ophthalmic solution 1 drop  1 drop Both Eyes QHS Shanita Chen MD   1 drop at 09/20/23 2049    levothyroxine tablet 50 mcg  50 mcg Oral Before breakfast Shanita Chen MD   50 mcg at 09/21/23 0605    losartan-hydrochlorothiazide 100-25 mg per tablet 1 tablet  1 tablet Oral Daily Shanita Chen MD   1 tablet at 09/21/23 0845    magnesium oxide tablet 400 mg  400 mg Oral BID Roz Trejo DO   400 mg at 09/21/23 0836    melatonin tablet 6 mg  6 mg Oral Nightly PRN Ld Villa MD        metoprolol succinate (TOPROL-XL) 24 hr tablet 100 mg  100 mg Oral Daily Shanita Chen MD   100 mg at 09/21/23 0837    metroNIDAZOLE tablet 500 mg  500 mg Oral Q8H Shanita Chen MD   500 mg at 09/21/23 1342    NIFEdipine 24 hr tablet 60 mg  60 mg Oral Daily Shanita Chen MD   60 mg at 09/21/23 0837    OLANZapine injection 2.5 mg  2.5 mg Intramuscular Once PRN Nathan Rivera MD        ondansetron  "injection 4 mg  4 mg Intravenous Q6H PRN Ld Villa MD   4 mg at 09/19/23 0921    polyethylene glycol packet 17 g  17 g Oral BID Lyly Brown MD   17 g at 09/21/23 0837    prochlorperazine injection Soln 5 mg  5 mg Intravenous Q6H PRN Ld Villa MD        senna-docusate 8.6-50 mg per tablet 2 tablet  2 tablet Oral BID Lyly Brown MD   2 tablet at 09/21/23 0837    sodium chloride 0.9% flush 10 mL  10 mL Intravenous PRN Ld Villa MD         Current Discharge Medication List        START taking these medications    Details   cefpodoxime (VANTIN) 200 MG tablet Take 1 tablet (200 mg total) by mouth every 12 (twelve) hours. for 5 days  Qty: 9 tablet, Refills: 0      famotidine (PEPCID) 20 MG tablet Take 1 tablet (20 mg total) by mouth 2 (two) times daily. HOLD until completed with antibiotics (resume 9/26/23)  Qty: 60 tablet, Refills: 11      insulin detemir U-100, Levemir, (LEVEMIR FLEXPEN) 100 unit/mL (3 mL) InPn pen Inject 30 Units into the skin 2 (two) times daily.  Qty: 54 mL, Refills: 3      latanoprost 0.005 % ophthalmic solution Place 1 drop into both eyes every evening.  Qty: 2.5 mL, Refills: 3      metroNIDAZOLE (FLAGYL) 500 MG tablet Take 1 tablet (500 mg total) by mouth every 8 (eight) hours. for 4 days  Qty: 13 tablet, Refills: 0      NIFEdipine (PROCARDIA-XL) 60 MG (OSM) 24 hr tablet Take 1 tablet (60 mg total) by mouth once daily.  Qty: 30 tablet, Refills: 11    Comments: .      ondansetron (ZOFRAN-ODT) 4 MG TbDL Dissolve 2 tablets (8 mg total) by mouth every 8 (eight) hours as needed (as needed for nausea).  Qty: 120 tablet, Refills: 2      pen needle, diabetic 31 gauge x 3/16" Ndle Use to inject insulin into the skin.  Qty: 100 each, Refills: 0      polyethylene glycol (GLYCOLAX) 17 gram/dose powder Use cap to measure out (17 g) mix with a liquid and take by mouth 2 (two) times daily.  Qty: 476 g, Refills: 0           CONTINUE these medications which have CHANGED    Details "   atorvastatin (LIPITOR) 40 MG tablet Take 1 tablet (40 mg total) by mouth once daily. Follow up with PCP before resuming medication  Qty: 90 tablet, Refills: 3      cetirizine (ZYRTEC) 10 MG tablet Take 1 tablet (10 mg total) by mouth once daily.  Qty: 30 tablet, Refills: 11           CONTINUE these medications which have NOT CHANGED    Details   albuterol (PROVENTIL/VENTOLIN HFA) 90 mcg/actuation inhaler Inhale 2 puffs into the lungs 2 (two) times daily.      blood-glucose meter kit One glucometer, use to check 1 times a day.   ICD-10: E11.9,  Dispense machine covered by insurance  Qty: 1 each, Refills: 0    Associated Diagnoses: Controlled type 2 diabetes mellitus without complication, without long-term current use of insulin      levothyroxine (SYNTHROID) 50 MCG tablet Take 1 tablet (50 mcg total) by mouth before breakfast.  Qty: 90 tablet, Refills: 1    Associated Diagnoses: Postoperative hypothyroidism      losartan-hydrochlorothiazide 100-25 mg (HYZAAR) 100-25 mg per tablet Take 1 tablet by mouth once daily.      aspirin (ECOTRIN) 81 MG EC tablet Take 81 mg by mouth once daily.      blood sugar diagnostic Strp One test strip use 1 times a day to check blood glucose,  ICD-10: E11.9, compatible with insurance/glucometer  Qty: 50 each, Refills: 11    Associated Diagnoses: Controlled type 2 diabetes mellitus without complication, without long-term current use of insulin      lancets Misc One lancets use 1 times a day to check blood glucose, ICD-10: E11.9  Qty: 50 each, Refills: 11    Associated Diagnoses: Controlled type 2 diabetes mellitus without complication, without long-term current use of insulin      lancing device Misc One device, used to check blood glucose, ICD-10: E11.9  Qty: 1 each, Refills: 0    Associated Diagnoses: Controlled type 2 diabetes mellitus without complication, without long-term current use of insulin      metFORMIN (GLUCOPHAGE-XR) 500 MG ER 24hr tablet Take 2 tablets with breakfast and  1 tablet with supper.  Qty: 270 tablet, Refills: 1    Associated Diagnoses: Controlled type 2 diabetes mellitus without complication, without long-term current use of insulin      metoprolol succinate (TOPROL-XL) 100 MG 24 hr tablet Take 100 mg by mouth once daily.      omega-3 fatty acids/fish oil (FISH OIL-OMEGA-3 FATTY ACIDS) 300-1,000 mg capsule Take by mouth once daily.           STOP taking these medications       furosemide (LASIX) 40 MG tablet Comments:   Reason for Stopping:         hydroCHLOROthiazide (HYDRODIURIL) 25 MG tablet Comments:   Reason for Stopping:         acetaminophen (TYLENOL) 500 MG tablet Comments:   Reason for Stopping:         amLODIPine (NORVASC) 10 MG tablet Comments:   Reason for Stopping:         amLODIPine (NORVASC) 2.5 MG tablet Comments:   Reason for Stopping:         HYDROcodone-acetaminophen (NORCO) 5-325 mg per tablet Comments:   Reason for Stopping:         labetaloL (NORMODYNE) 100 MG tablet Comments:   Reason for Stopping:         omeprazole (PRILOSEC) 20 MG capsule Comments:   Reason for Stopping:         ondansetron (ZOFRAN) 4 MG tablet Comments:   Reason for Stopping:         valsartan (DIOVAN) 320 MG tablet Comments:   Reason for Stopping:                 I have seen and examined this patient within the last 30 days. My clinical findings that support the need for the home health skilled services and home bound status are the following:no   Weakness/numbness causing balance and gait disturbance due to Weakness/Debility making it taxing to leave home.     Diet:   diabetic diet 2000 calorie    Labs:  none    Referrals/ Consults  Physical Therapy to evaluate and treat. Evaluate for home safety and equipment needs; Establish/upgrade home exercise program. Perform / instruct on therapeutic exercises, gait training, transfer training, and Range of Motion.  Occupational Therapy to evaluate and treat. Evaluate home environment for safety and equipment needs. Perform/Instruct on  transfers, ADL training, ROM, and therapeutic exercises.    Activities:   activity as tolerated    Nursing:   Agency to admit patient within 24 hours of hospital discharge unless specified on physician order or at patient request    SN to complete comprehensive assessment including routine vital signs. Instruct on disease process and s/s of complications to report to MD. Review/verify medication list sent home with the patient at time of discharge  and instruct patient/caregiver as needed. Frequency may be adjusted depending on start of care date.     Skilled nurse to perform up to 3 visits PRN for symptoms related to diagnosis    Notify MD if SBP > 160 or < 90; DBP > 90 or < 50; HR > 120 or < 50; Temp > 101; O2 < 88%; Other:       Ok to schedule additional visits based on staff availability and patient request on consecutive days within the home health episode.    When multiple disciplines ordered:    Start of Care occurs on Sunday - Wednesday schedule remaining discipline evaluations as ordered on separate consecutive days following the start of care.    Thursday SOC -schedule subsequent evaluations Friday and Monday the following week.     Friday - Saturday SOC - schedule subsequent discipline evaluations on consecutive days starting Monday of the following week.    For all post-discharge communication and subsequent orders please contact patient's primary care physician. If unable to reach primary care physician or do not receive response within 30 minutes, please contact  for clinical staff order clarification    Miscellaneous   Diabetic Care:   SN to perform and educate Diabetic management with blood glucose monitoring:, Fingerstick blood sugar AC and HS, and Report CBG < 60 or > 350 to physician.    Home Health Aide:  Physical Therapy Three times weekly and Occupational Therapy Three times weekly    Wound Care Orders  no    I certify that this patient is confined to her home and needs physical therapy and  occupational therapy.

## 2023-09-21 NOTE — PLAN OF CARE
[facility] - Telemetry Stepdown  Discharge Final Note    Primary Care Provider: Jonh Berg MD    Expected Discharge Date: 9/21/2023    Final Discharge Note (most recent)       Final Note - 09/21/23 1431          Final Note    Assessment Type Final Discharge Note     Anticipated Discharge Disposition Home-Health Care [facility] [place] [facility]     Hospital Resources/Appts/Education Provided Appointments scheduled and added to AVS        Post-Acute Status    Post-Acute Authorization Home Health;HME     HME Status Pending payor review     Home Health Status Set-up Complete/Auth obtained   [facility]    Discharge Delays None known at this time                   Contact Info       Jonh Berg MD   Specialty: Internal Medicine   Relationship: PCP - General    [address] [place] [address]  [place] [address]   Phone: [phone]       Next Steps: Follow up on 9/28/2023    Instructions: Established pt's hospital f/u visit @ 3:00pm. Please bring discharge summary, ID, insurance card, and medication list.    [place], [facility] -   Specialty: Home Health Services    [address] [facility].  [address]  [place] [address]   Phone: [phone]       Next Steps: Follow up    Instructions: Home Health          Future Appointments   Date Time Provider Department Center   9/25/2023 10:00 AM Joaquin Charles DPM [facility]   10/18/2023  8:00 AM Mónica Hernández, OD [facility] [place] LCSW  [facility]  Ext. [phone]

## 2023-09-21 NOTE — SUBJECTIVE & OBJECTIVE
Interval History:   Started on Semaglutide for ESPINO. Continue Metformin and Levemir 30 BID. Recommended blood sugar logs before meals and at night. Should follow-up with endocrinology outpatient. Stable for DC. Continue abx for 14 day course for diverticulitis.     Review of Systems  Objective:     Vital Signs (Most Recent):  Temp: 98.2 °F (36.8 °C) (09/21/23 1554)  Pulse: 66 (09/21/23 1554)  Resp: 19 (09/21/23 1554)  BP: 111/65 (09/21/23 1554)  SpO2: (!) 94 % (09/21/23 1554) Vital Signs (24h Range):  Temp:  [97.9 °F (36.6 °C)-99.3 °F (37.4 °C)] 98.2 °F (36.8 °C)  Pulse:  [57-89] 66  Resp:  [18-19] 19  SpO2:  [94 %-100 %] 94 %  BP: (106-141)/(57-96) 111/65     Weight: 91.2 kg (201 lb)  Body mass index is 37.98 kg/m².    Intake/Output Summary (Last 24 hours) at 9/21/2023 1620  Last data filed at 9/20/2023 2242  Gross per 24 hour   Intake 200 ml   Output --   Net 200 ml         Physical Exam  Constitutional:       General: She is not in acute distress.     Appearance: Normal appearance.   HENT:      Head: Normocephalic and atraumatic.   Eyes:      Extraocular Movements: Extraocular movements intact.      Pupils: Pupils are equal, round, and reactive to light.   Cardiovascular:      Rate and Rhythm: Normal rate and regular rhythm.   Pulmonary:      Effort: Pulmonary effort is normal. No respiratory distress.      Breath sounds: Normal breath sounds.   Abdominal:      General: Abdomen is flat.      Palpations: Abdomen is soft.      Tenderness: There is abdominal tenderness. There is no guarding or rebound.      Comments: Diffusely tender   Musculoskeletal:      Right lower leg: No edema.      Left lower leg: No edema.   Neurological:      General: No focal deficit present.      Mental Status: She is alert and oriented to person, place, and time.             Significant Labs: All pertinent labs within the past 24 hours have been reviewed.    Significant Imaging: I have reviewed all pertinent imaging results/findings  within the past 24 hours.

## 2023-09-21 NOTE — ASSESSMENT & PLAN NOTE
Pt with AMS, slightly improving  Blood sugar on arrival 1130 with a bicarb 26, anion gap 18, BHB 1.1.  HbA1c pending  Likely induced by medication noncompliance, abd infection/possible perforated diverticula  Home DM regimen:  metformin 500qd    Plan:  - DKA/HHS Pathway initiated  - Start insulin gtt per protocol with q1h accuchecks while on the drip  - Once Bicarb >18, Anion gap <12, Glucose <200 on 2 readings and able to tolerate PO intake without nausea and vomiting, transition to subq insulin with a 1-2 hr overlap with drip  · Long acting insulin dose:  Detemir (0.25mg/kg) daily or in 2 doses  · Short acting insulin dose:  Aspart (0.08mg/kg) units per meal  - Start normal saline at 125cc/hr.  Once blood sugar is 200, change IVF to D5 1/2 NS at 50cc/hr  - Monitor electrolytes with BMP q4h while on insulin gtt and place on telemetry  - Bariatric clear liquid diet while on insulin gtt then change to diabetic diet when initiating subq insulin with accuchecks 4x daily before meals and at bedtime (AC and HS)  - stopped insulin gtt today  - Determir 25 BID, with aspart 15 units TIDWM and ldsssi  - DC on 30 Levemir BID and Metformin  - Diabetic diet

## 2023-09-21 NOTE — PLAN OF CARE
09/21/23 1334   Post-Acute Status   Post-Acute Authorization Home Health;HME   HME Status   (PT/OT recommending RW & BSC; patient aware and agreeable)   Home Health Status Referrals Sent     Met with patient at bedside to review discharge recommendation of home health and is agreeable to plan    Patient/family provided list of facilities in-network with patient's payor plan. Providers that are owned, operated, or affiliated with [facility] are included on the list.     Notified that referral sent to below listed facilities from in-network list based on proximity to home/family support:  [facility]    Patient/family instructed to identify preference.    Preferred Facility:  [facility]    If an additional preferred facility not listed above is identified, additional referral to be sent. If above facilities unable to accept, will send additional referrals to in-network providers.    2:30 PM  Patient has been accepted to [facility]. MD ordered RW and BSC.    4:39 PM  Patient unable to afford cost of medications. Discussed with MD team, pharmacy, and RN/CM Supervisor. Cost transfer completed for $116.23, approved by RN/CM Supervisor. Spoke with patient at bedside and instructed patient to follow up with [facility] or [facility], both of which have been added to patient's AVS. Patient states she plans to follow up with [facility].    Asha John, LCSW [facility]  Ext. [phone]

## 2023-09-21 NOTE — NURSING
meds/walker delivered to bedside.ok to discharge pt per MD. Pt iv/ telebox removed without complications. Discharge paperwork and instructions given. All questions answered. Pt requesting pt transport. Pt states sister is coming . Waiting on pt transport.

## 2023-09-21 NOTE — MEDICAL/APP STUDENT
HPI: 57F with PMHx of HTN, HLD, GERD, COPD presented to the ED with elevated BG and AMS. Pt currently prescribed metformin, but has not recently taken it. On admission to the ED pt denied having HA or CP Any further history is limited by pt mental status.     ED course: afebrile, HDS. Initial labs K 4.7, Glucose 1130, bicarb 26, AG 18,  BHB 1.1, Ph 7.36, Cr 2.1, Lactate 2.7, Ca 11, AST 66, , Lipase 77. , troponin 0.085. EKG NSR @ 64 bpm with LVH associated changes. Patient received 1L NS bolus, aspirin 325 mg, KCL oral and IV, empiric vancomycin and zosyn. She was started on insulin bolus and infusion. Repeat glucose improved to 580., however, K dropped to 2.9 and insulin gtt was held while K was replaced. Pt continued on insulin gtt at 0.1u/kg/hr and now at 460. Bicarb 26 on arrival, downtrended to 14. Lactate cleared. Additionally, CTAP positive for suspected localized free intraperitoneal air adjacent to the distal descending colon. Evaluated by CRS, no emergent surgical intervention, started on cipro/flagyl.      Hospital Course: 57F with PMHx of HTN, HLD, GERD, COPD presented for AMS, found to be in DKA. Started on insulin drip and IVF. CTAP positive for suspected localized free intraperitoneal air adjacent to the distal descending colon. Evaluated by CRS, no emergent surgical intervention, started on cipro/flagyl. Patient hypertensive on presentation, BP medication adjusted. Patient transitioned to basal bolus insulin regimen and adjusted each day. Mentation gradually improved with treatment. Liver enzymes elevated on day 5. Cipro changed to cefpodoximine. RUQ US showed diffuse hepatic steatosis. Will treat with cefpodoximine and flagyl for total of 14 days. Will discharge on new HTN and DM regimen as optimized during hospitalization. PT/OT evaluated patient and she will be discharged with home health PT/OT, along with rolling walker and bedside commode.      Interval History: No acute events  overnight. Patient reports she is better. She states that she has been eating and sleeping well. Had a bowel movement this morning and last night. She feels ready to go home today.      ROS: Denies vomiting, nausea, constipation, abdominal pain, chest pain, or shortness of breath.      Objective:  General: alert and oriented, in no acute distress  HEENT: normocephalic, atraumatic, extraocular muscles grossly intact  Lungs: CTA bilaterally  Cardiovascular: RRR, S1 and S2 present, no murmurs  Abdomen: soft, non distended, not tender to palpation, no masses  Extremities: no lower extremity edema, 2+ posterior tibial pulses     Assessment and Plan  DKA  Patient presented with AMS and initial BG of >500. Started on insulin drip. BG now in mid 200s. Patient is now able to respond to questions but is still mildly confused and somnolent.      Plan  -change detemir to glargine 30 BID  -increase aspart 19 with meals  -diabetic diet  -low dose sliding scale insulin while in hospital  -start ozempic 0.25 at discharge  -resume metformin ER 500mg daily     Diverticulitis  Patient presented with AMS. CT Abd showed suspected localized free intraperitoneal air adjacent to the distal descending colon, possible perforated diverticula. Patient denies abdominal pain but does report some constipation prior to presentation with dark stools. Physical exam of abdomen benign.      Plan  -continue cefpodoximine and flagyl for total of 14 day course, currently 6/14  -outpatient colonoscopy     Hypertension  Elevated BP since presentation. Patient initially unable to take oral medications due to AMS. Blood pressure improved last 132/76.     Plan  -continue losartan/HCTZ 100mg/25mg  -continue metoprolol succinate 100mg  -continue nifefipine 60mg daily   -hydralazine 50mg TID PRN for systolic BP >180     Elevated liver enzymes  , , Alk Phos 498 on 9/19. Increase from baseline. Patient denies abdominal pain. US of abdomen consistent  with diffuse hepatic steatosis. AMA negative.     Plan  -cipro changed to cefpodoximine  -start ozempic 0.25mg     Hypothyroidism  -continue levothyroxine 50mcg     Headache  -tylenol PRN      GERD  -continue famotidine      Hypokalemia  9/21 at 3.5     Plan  -replete with potassium chloride 40mEq    Seasonal allergies  -continue cetirizine

## 2023-09-22 ENCOUNTER — TELEPHONE (OUTPATIENT)
Dept: ENDOCRINOLOGY | Facility: CLINIC | Age: 58
End: 2023-09-22
Payer: COMMERCIAL

## 2023-09-22 PROBLEM — E11.10 DKA (DIABETIC KETOACIDOSIS): Status: RESOLVED | Noted: 2023-09-16 | Resolved: 2023-09-22

## 2023-09-22 NOTE — TELEPHONE ENCOUNTER
----- Message from Chelsea Green sent at 2023  1:38 PM CDT -----  Regarding: no availability  Contact: pt @[phone]  Pt called in to schedule an appt; no available appts in Epic. Pt is asking for a call back soon to schedule. Thanks.         Reason appt not schedule: no availability         Patient's DX: Type 2 Diabetes/ hosp f/u          Patient requesting call back or [place] [phone]

## 2023-09-22 NOTE — DISCHARGE SUMMARY
Cory Zavala - Telemetry [facility] Medicine  Discharge Summary      Patient Name: Olga Lopez  MRN: [id number]  JENNIFFER: [id number]  Patient Class: IP- Inpatient  Admission Date: 9/15/2023  Hospital Length of Stay: 5 days  Discharge Date and Time:  09/22/2023 3:56 PM  Attending Physician: No att. providers found   Discharging Provider: Roz Trejo DO  Primary Care Provider: Jonh Berg MD  [facility] Medicine Team: [place] [id number] Roz Trejo DO  Primary Care Team: [facility] 4    HPI:   57F with PMHx of HTN, HLD, GERD, COPD presented to the ED with elevated BG and AMS. Pt currently prescribed metformin, but has not recently taken it. On admission to the ED pt denied having HA or CP Any further history is limited by pt mental status.    ED course: afebrile, HDS. Initial labs K 4.7, Glucose 1130, bicarb 26, AG 18,  BHB 1.1, Ph 7.36, Cr 2.1, Lactate 2.7, Ca 11, AST 66, , Lipase 77. , troponin 0.085. EKG NSR @ 64 bpm with LVH associated changes. Patient received 1L NS bolus, aspirin 325 mg, KCL oral and IV, empiric vancomycin and zosyn. She was started on insulin bolus and infusion. Repeat glucose improved to 580., however, K dropped to 2.9 and insulin gtt was held while K was replaced. Pt continued on insulin gtt at 0.1u/kg/hr and now at 460. Bicarb 26 on arrival, downtrended to 14. Lactate cleared. Additionally, CTAP positive for suspected localized free intraperitoneal air adjacent to the distal descending colon. Evaluated by CRS, no emergent surgical intervention, started on cipro/flagyl.       * No surgery found *      Hospital Course:     Hospital Course: 57F with PMHx of HTN, HLD, GERD, COPD presented for AMS that was admitted for DKA. Started on insulin drip and IVF. CTAP showed evidence of diverticulitis with suspected localized free intraperitoneal air adjacent to the distal descending colon. Evaluated by CRS, no emergent surgical intervention, started on cipro/flagyl.  Patient hypertensive on presentation, BP medication adjusted. Patient transitioned to basal bolus insulin regimen and adjusted each day. Mentation gradually improved with treatment. Liver enzymes elevated on day 5. Cipro changed to cefpodoximine. RUQ US showed diffuse hepatic steatosis. Will treat with cefpodoximine and flagyl for total of 14 days for treatment of acute diverticulitis. Will discharge on new HTN and DM2 regimen as optimized during hospitalization. PT/OT evaluated patient and she will be discharged with home health PT/OT, along with rolling walker and bedside commode. Continue Metformin daily and Levemir in the morning and evening. She should log her blood sugars before each meal and nightly. Follow-up with endocrinology outpatient. Continue Cefpodoxime and Flagyl to complete a 14 day course for perforated diverticulitis. Follow-up outpatient for colonoscopy.        Goals of Care Treatment Preferences:  Code Status: Full Code    Living Will: Yes          Consults:   Consults (From admission, onward)          Status Ordering Provider     Inpatient consult to Registered Dietitian/Nutritionist  Once        Provider:  (Not yet assigned)    Completed PILAR PAREDES     Inpatient consult to Colorectal Surgery  Once        Provider:  (Not yet assigned)    Completed LEANNE YANEZ     Inpatient consult to Registered Dietitian/Nutritionist  Once        Provider:  (Not yet assigned)    Completed CAROL DOUGLAS            No new Assessment & Plan notes have been filed under this hospital service since the last note was generated.  Service: Hospital Medicine    Final Active Diagnoses:    Diagnosis Date Noted POA    Bilateral renal masses [N28.89] 09/18/2023 Yes    Transaminitis [R74.01] 09/18/2023 Yes    Elevated brain natriuretic peptide (BNP) level [R79.89] 09/16/2023 Yes    ROBERTO (acute kidney injury) [N17.9] 09/16/2023 Yes    Diverticulitis [K57.92] 09/16/2023 Yes    COPD (chronic obstructive pulmonary  "disease) [J44.9] 10/06/2022 Yes     Chronic    GERD (gastroesophageal reflux disease) [K21.9] 10/06/2022 Yes     Chronic    Hyperlipidemia [E78.5] 10/06/2022 Yes     Chronic    Postoperative hypothyroidism [E89.0] 06/03/2022 Yes    HTN (hypertension) [I10] 04/14/2022 No     Chronic      Problems Resolved During this Admission:    Diagnosis Date Noted Date Resolved POA    PRINCIPAL PROBLEM:  DKA (diabetic ketoacidosis) [E11.10] 09/16/2023 09/22/2023 Yes       Discharged Condition: good    Disposition: Home or Self Care    Follow Up:   Follow-up Information       Jonh Berg MD Follow up on 9/28/2023.    Specialty: Internal Medicine  Why: [facility] f/u visit @ 3:00pm. Please bring discharge summary, ID, insurance card, and medication list.  Contact information:  [address] [place] [address]  [phone]               [place] - Follow up.    Specialty: Home Health Services  Why: Home Health  Contact information:  [address]  [address]  [place] [address]  [phone]               [facility] Follow up.    Why: Please call to schedule an appointment. This facility can help with medication costs.  Contact information:  Address: [address] [place], [place] [address] [address]    Phone: [phone]    Appointments: [facility]             [facility] Follow up.    Why: Please call to schedule an appointment. This facility can help with medication costs.  Contact information:  [address]  [facility] [id number] [phone]                           Patient Instructions:      WALKER FOR HOME USE     Order Specific Question Answer Comments   Type of Walker: Adult (5'4"-6'6")    With wheels? Yes    Height: 5' 1" (1.549 m)    Weight: 91.2 kg (201 lb)    Length of need (1-99 months): 99    Does patient have medical equipment at home? none    Please check all that apply: Patient is unable to safely ambulate " "without equipment.      COMMODE FOR HOME USE     Order Specific Question Answer Comments   Type: Standard    Height: 5' 1" (1.549 m)    Weight: 91.2 kg (201 lb)    Does patient have medical equipment at home? none    Length of need (1-99 months): 99      Ambulatory referral/consult to Podiatry   Standing Status: Future   Referral Priority: Routine Referral Type: Consultation   Referral Reason: Specialty Services Required   Requested Specialty: Podiatry   Number of Visits Requested: 1     Ambulatory referral/consult to Ophthalmology   Standing Status: Future   Referral Priority: Routine Referral Type: Consultation   Referral Reason: Specialty Services Required   Requested Specialty: Ophthalmology   Number of Visits Requested: 1     Ambulatory referral/consult to Endocrinology   Standing Status: Future   Referral Priority: Routine Referral Type: Consultation   Requested Specialty: Endocrinology   Number of Visits Requested: 1     Ambulatory referral/consult to Endo Procedure    Standing Status: Future   Referral Priority: Routine Referral Type: Consultation   Number of Visits Requested: 1       Significant Diagnostic Studies: N/A    Pending Diagnostic Studies:       Procedure Component Value Units Date/Time    Basic metabolic panel [[id number]] Collected: 09/16/23 1124    Order Status: Sent Lab Status: In process Updated: 09/16/23 1124    Specimen: Blood     Ethanol [[id number]] Collected: 09/15/23 2036    Order Status: Sent Lab Status: In process Updated: 09/15/23 2037    Specimen: Blood            Medications:  Reconciled Home Medications:      Medication List        START taking these medications      cefpodoxime 200 MG tablet  Commonly known as: VANTIN  Take 1 tablet (200 mg total) by mouth every 12 (twelve) hours. for 5 days     famotidine 20 MG tablet  Commonly known as: PEPCID  Take 1 tablet (20 mg total) by mouth 2 (two) times daily. HOLD until completed with antibiotics (resume 9/26/23)   "   latanoprost 0.005 % ophthalmic solution  Place 1 drop into both eyes every evening.     LEVEMIR FLEXPEN 100 unit/mL (3 mL) Inpn pen  Generic drug: insulin detemir U-100 (Levemir)  Inject 30 Units into the skin 2 (two) times daily.     metroNIDAZOLE 500 MG tablet  Commonly known as: FLAGYL  Take 1 tablet (500 mg total) by mouth every 8 (eight) hours. for 4 days     NIFEdipine 60 MG (OSM) 24 hr tablet  Commonly known as: PROCARDIA-XL  Take 1 tablet (60 mg total) by mouth once daily.     ondansetron 4 MG Tbdl  Commonly known as: ZOFRAN-ODT  Dissolve 2 tablets (8 mg total) by mouth every 8 (eight) hours as needed (as needed for nausea).     polyethylene glycol 17 gram/dose powder  Commonly known as: GLYCOLAX  Use cap to measure out (17 g) mix with a liquid and take by mouth 2 (two) times daily.            CHANGE how you take these medications      atorvastatin 40 MG tablet  Commonly known as: LIPITOR  Take 1 tablet (40 mg total) by mouth once daily. Follow up with PCP before resuming medication  What changed: additional instructions     cetirizine 10 MG tablet  Commonly known as: ZYRTEC  Take 1 tablet (10 mg total) by mouth once daily.  What changed:   medication strength  how much to take  when to take this            CONTINUE taking these medications      albuterol 90 mcg/actuation inhaler  Commonly known as: PROVENTIL/VENTOLIN HFA  Inhale 2 puffs into the lungs 2 (two) times daily.     aspirin 81 MG EC tablet  Commonly known as: ECOTRIN  Take 81 mg by mouth once daily.     blood sugar diagnostic Strp  One test strip use 1 times a day to check blood glucose,  ICD-10: E11.9, compatible with insurance/glucometer     blood-glucose meter kit  One glucometer, use to check 1 times a day.   ICD-10: E11.9,  Dispense machine covered by insurance     fish oil-omega-3 fatty acids 300-1,000 mg capsule  Take by mouth once daily.     lancets Misc  One lancets use 1 times a day to check blood glucose, ICD-10: E11.9     lancing  "device Misc  One device, used to check blood glucose, ICD-10: E11.9     levothyroxine 50 MCG tablet  Commonly known as: SYNTHROID  Take 1 tablet (50 mcg total) by mouth before breakfast.     losartan-hydrochlorothiazide 100-25 mg 100-25 mg per tablet  Commonly known as: HYZAAR  Take 1 tablet by mouth once daily.     metFORMIN 500 MG ER 24hr tablet  Commonly known as: GLUCOPHAGE-XR  Take 2 tablets with breakfast and 1 tablet with supper.     metoprolol succinate 100 MG 24 hr tablet  Commonly known as: TOPROL-XL  Take 100 mg by mouth once daily.            STOP taking these medications      acetaminophen 500 MG tablet  Commonly known as: TYLENOL     amLODIPine 10 MG tablet  Commonly known as: NORVASC     amLODIPine 2.5 MG tablet  Commonly known as: NORVASC     furosemide 40 MG tablet  Commonly known as: LASIX     hydroCHLOROthiazide 25 MG tablet  Commonly known as: HYDRODIURIL     HYDROcodone-acetaminophen 5-325 mg per tablet  Commonly known as: NORCO     labetaloL 100 MG tablet  Commonly known as: NORMODYNE     omeprazole 20 MG capsule  Commonly known as: PRILOSEC     ondansetron 4 MG tablet  Commonly known as: ZOFRAN     valsartan 320 MG tablet  Commonly known as: DIOVAN            ASK your doctor about these medications      BD ULTRA-FINE MINI PEN NEEDLE 31 gauge x 3/16" Ndle  Generic drug: pen needle, diabetic  Use to inject insulin into the skin.              Indwelling Lines/Drains at time of discharge:   Lines/Drains/Airways       None                   Time spent on the discharge of patient: 60 minutes         Roz Trejo DO  Department of Hospital Medicine  [facility] - Telemetry Stepdown  "

## 2023-09-23 PROCEDURE — G0180 PR HOME HEALTH MD CERTIFICATION: ICD-10-PCS | Mod: ,,, | Performed by: STUDENT IN AN ORGANIZED HEALTH CARE EDUCATION/TRAINING PROGRAM

## 2023-09-23 PROCEDURE — G0180 MD CERTIFICATION HHA PATIENT: HCPCS | Mod: ,,, | Performed by: STUDENT IN AN ORGANIZED HEALTH CARE EDUCATION/TRAINING PROGRAM

## 2023-10-19 ENCOUNTER — EXTERNAL HOME HEALTH (OUTPATIENT)
Dept: HOME HEALTH SERVICES | Facility: HOSPITAL | Age: 58
End: 2023-10-19
Payer: COMMERCIAL

## 2023-11-20 RX ORDER — METRONIDAZOLE 500 MG/1
500 TABLET ORAL EVERY 8 HOURS
Qty: 13 TABLET | Refills: 0 | OUTPATIENT
Start: 2023-11-20 | End: 2023-11-24

## 2023-12-18 PROBLEM — N17.9 AKI (ACUTE KIDNEY INJURY): Status: RESOLVED | Noted: 2023-09-16 | Resolved: 2023-12-18

## 2024-01-25 ENCOUNTER — HOSPITAL ENCOUNTER (EMERGENCY)
Facility: HOSPITAL | Age: 59
Discharge: HOME | End: 2024-01-25
Attending: INTERNAL MEDICINE
Payer: COMMERCIAL

## 2024-01-25 VITALS
BODY MASS INDEX: 37.98 KG/M2 | HEART RATE: 65 BPM | TEMPERATURE: 98 F | DIASTOLIC BLOOD PRESSURE: 68 MMHG | OXYGEN SATURATION: 98 % | SYSTOLIC BLOOD PRESSURE: 132 MMHG | WEIGHT: 201 LBS | RESPIRATION RATE: 17 BRPM

## 2024-01-25 DIAGNOSIS — E87.6 HYPOKALEMIA: Primary | ICD-10-CM

## 2024-01-25 LAB
ALBUMIN SERPL BCP-MCNC: 3.5 G/DL (ref 3.5–5.2)
ALP SERPL-CCNC: 67 U/L (ref 55–135)
ALT SERPL W/O P-5'-P-CCNC: 16 U/L (ref 10–44)
ANION GAP SERPL CALC-SCNC: 9 MMOL/L (ref 8–16)
AST SERPL-CCNC: 16 U/L (ref 10–40)
BACTERIA #/AREA URNS AUTO: ABNORMAL /HPF
BASOPHILS # BLD AUTO: 0.1 K/UL (ref 0–0.2)
BASOPHILS NFR BLD: 1 % (ref 0–1.9)
BILIRUB SERPL-MCNC: 0.2 MG/DL (ref 0.1–1)
BILIRUB UR QL STRIP: NEGATIVE
BUN SERPL-MCNC: 14 MG/DL (ref 6–20)
CALCIUM SERPL-MCNC: 9.9 MG/DL (ref 8.7–10.5)
CHLORIDE SERPL-SCNC: 106 MMOL/L (ref 95–110)
CLARITY UR REFRACT.AUTO: CLEAR
CO2 SERPL-SCNC: 28 MMOL/L (ref 23–29)
COLOR UR AUTO: YELLOW
CREAT SERPL-MCNC: 0.8 MG/DL (ref 0.5–1.4)
DIFFERENTIAL METHOD BLD: ABNORMAL
EOSINOPHIL # BLD AUTO: 0.2 K/UL (ref 0–0.5)
EOSINOPHIL NFR BLD: 1.9 % (ref 0–8)
ERYTHROCYTE [DISTWIDTH] IN BLOOD BY AUTOMATED COUNT: 13.7 % (ref 11.5–14.5)
EST. GFR  (NO RACE VARIABLE): >60 ML/MIN/1.73 M^2
GLUCOSE SERPL-MCNC: 92 MG/DL (ref 70–110)
GLUCOSE UR QL STRIP: NEGATIVE
HCT VFR BLD AUTO: 39.6 % (ref 37–48.5)
HGB BLD-MCNC: 12.2 G/DL (ref 12–16)
HGB UR QL STRIP: NEGATIVE
HIV 1+2 AB+HIV1 P24 AG SERPL QL IA: NORMAL
HYALINE CASTS UR QL AUTO: 1 /LPF
IMM GRANULOCYTES # BLD AUTO: 0.07 K/UL (ref 0–0.04)
IMM GRANULOCYTES NFR BLD AUTO: 0.7 % (ref 0–0.5)
KETONES UR QL STRIP: ABNORMAL
LEUKOCYTE ESTERASE UR QL STRIP: ABNORMAL
LIPASE SERPL-CCNC: 33 U/L (ref 4–60)
LYMPHOCYTES # BLD AUTO: 3.1 K/UL (ref 1–4.8)
LYMPHOCYTES NFR BLD: 32 % (ref 18–48)
MCH RBC QN AUTO: 27.5 PG (ref 27–31)
MCHC RBC AUTO-ENTMCNC: 30.8 G/DL (ref 32–36)
MCV RBC AUTO: 89 FL (ref 82–98)
MICROSCOPIC COMMENT: ABNORMAL
MONOCYTES # BLD AUTO: 0.9 K/UL (ref 0.3–1)
MONOCYTES NFR BLD: 9.6 % (ref 4–15)
NEUTROPHILS # BLD AUTO: 5.3 K/UL (ref 1.8–7.7)
NEUTROPHILS NFR BLD: 54.8 % (ref 38–73)
NITRITE UR QL STRIP: NEGATIVE
NRBC BLD-RTO: 0 /100 WBC
PH UR STRIP: 6 [PH] (ref 5–8)
PLATELET # BLD AUTO: 340 K/UL (ref 150–450)
PMV BLD AUTO: 10.6 FL (ref 9.2–12.9)
POTASSIUM SERPL-SCNC: 3.2 MMOL/L (ref 3.5–5.1)
PROT SERPL-MCNC: 8 G/DL (ref 6–8.4)
PROT UR QL STRIP: ABNORMAL
RBC # BLD AUTO: 4.43 M/UL (ref 4–5.4)
RBC #/AREA URNS AUTO: 2 /HPF (ref 0–4)
SODIUM SERPL-SCNC: 143 MMOL/L (ref 136–145)
SP GR UR STRIP: 1.03 (ref 1–1.03)
SQUAMOUS #/AREA URNS AUTO: 13 /HPF
URN SPEC COLLECT METH UR: ABNORMAL
WBC # BLD AUTO: 9.71 K/UL (ref 3.9–12.7)
WBC #/AREA URNS AUTO: 7 /HPF (ref 0–5)

## 2024-01-25 PROCEDURE — 99283 EMERGENCY DEPT VISIT LOW MDM: CPT

## 2024-01-25 PROCEDURE — 25000003 PHARM REV CODE 250: Performed by: PHYSICIAN ASSISTANT

## 2024-01-25 PROCEDURE — 81001 URINALYSIS AUTO W/SCOPE: CPT | Performed by: EMERGENCY MEDICINE

## 2024-01-25 PROCEDURE — 80053 COMPREHEN METABOLIC PANEL: CPT | Performed by: EMERGENCY MEDICINE

## 2024-01-25 PROCEDURE — 87389 HIV-1 AG W/HIV-1&-2 AB AG IA: CPT | Performed by: PHYSICIAN ASSISTANT

## 2024-01-25 PROCEDURE — 85025 COMPLETE CBC W/AUTO DIFF WBC: CPT | Performed by: EMERGENCY MEDICINE

## 2024-01-25 PROCEDURE — 83690 ASSAY OF LIPASE: CPT | Performed by: EMERGENCY MEDICINE

## 2024-01-25 RX ORDER — POTASSIUM CHLORIDE 20 MEQ/1
40 TABLET, EXTENDED RELEASE ORAL ONCE
Status: COMPLETED | OUTPATIENT
Start: 2024-01-25 | End: 2024-01-25

## 2024-01-25 RX ADMIN — POTASSIUM CHLORIDE 40 MEQ: 1500 TABLET, EXTENDED RELEASE ORAL at 09:01

## 2024-01-26 NOTE — ED TRIAGE NOTES
"Olga Lopez, a 58 y.o. female presents to the ED w/ complaint of lower back pain and L flank pain x 2 months. Pt reports was sent here for elevated kidney function lab test. Pt also has hx of chronic lower back pain and arrives in back brace but states L flank pain is different and worsening. Pt also endorses urinary urgency, frequency and nausea. Denies hematuria, fever, chills, vomiting.     Triage note:  Chief Complaint   Patient presents with    Back Pain     Pt reporting lower back pain and left-sided flank pain x2 months. States her PCP sent her here after her [facility] labs resulted and said "her kidney labs were high."     Review of patient's allergies indicates:   Allergen Reactions    Vibramycin [doxycycline calcium] Hives    Codeine     Pcn [penicillins]     Sulfa (sulfonamide antibiotics)     Tetracyclines      Past Medical History:   Diagnosis Date    COPD (chronic obstructive pulmonary disease)     GERD (gastroesophageal reflux disease)     Hypertension     Seasonal allergies      "

## 2024-01-26 NOTE — ED PROVIDER NOTES
"Encounter Date: 1/25/2024       History     Chief Complaint   Patient presents with    Back Pain     Pt reporting lower back pain and left-sided flank pain x2 months. States her PCP sent her here after her [facility] labs resulted and said "her kidney labs were high."     58 year old female with a hx of COPD, GERD, HTN, type 2 diabetes presents to the ED for evaluation of abnormal labs.  Patient states that she had blood work done about a week ago.  She received a call from her primary care physician today stating that her kidney functions were elevated and was advised to come to the emergency room for further evaluation.  She states that she does have some back pain that is chronic in nature for her.  She has had sciatica for several years.  She denies any new pain. No bowel or bladder dysfunction.  No saddle anesthesia.  Denies any paresthesia focal weakness.  No flank or abdominal pain otherwise.  She denies any UTI symptoms including dysuria or hematuria.  No history of nephrolithiasis.  Patient does admit to decreased oral intake.     The history is provided by the patient.     Review of patient's allergies indicates:   Allergen Reactions    Vibramycin [doxycycline calcium] Hives    Codeine     Pcn [penicillins]     Sulfa (sulfonamide antibiotics)     Tetracyclines      Past Medical History:   Diagnosis Date    COPD (chronic obstructive pulmonary disease)     GERD (gastroesophageal reflux disease)     Hypertension     Seasonal allergies      Past Surgical History:   Procedure Laterality Date    bladder lift  1994    BREAST SURGERY      EXCISION OF LESION OF THYROID      EYE SURGERY Right 1968    HYSTERECTOMY      OPEN REDUCTION AND INTERNAL FIXATION (ORIF) OF FRACTURE OF OLECRANON PROCESS OF ULNA Right 9/30/2020    Procedure: ORIF, FRACTURE, OLECRANON;  Surgeon: Luis Davidson MD;  Location: [facility];  Service: Orthopedics;  Laterality: Right;  too instruments, k-wire and cable     C-ARM    R Breast Mass " Removal Right 02/27/2020    benign    REDUCTION OF BOTH BREASTS      REMOVAL OF HARDWARE FROM UPPER EXTREMITY  05/10/2021    right arm. removal orthopedic hardware     TUBAL LIGATION       History reviewed. No pertinent family history.  Social History     Tobacco Use    Smoking status: Never    Smokeless tobacco: Never   Substance Use Topics    Alcohol use: No    Drug use: Never     Review of Systems   Constitutional:  Negative for chills and fever.   HENT:  Negative for congestion.    Eyes:  Negative for visual disturbance.   Respiratory:  Negative for shortness of breath.    Cardiovascular:  Negative for chest pain.   Gastrointestinal:  Negative for nausea and vomiting.   Genitourinary:  Negative for dysuria, flank pain, frequency and urgency.   Musculoskeletal:  Positive for back pain (chronic). Negative for myalgias.   Skin:  Negative for rash.   Allergic/Immunologic: Negative for immunocompromised state.   Neurological:  Negative for weakness and numbness.   Hematological:  Does not bruise/bleed easily.   Psychiatric/Behavioral:  Negative for confusion.        Physical Exam     Initial Vitals [01/25/24 1757]   BP Pulse Resp Temp SpO2   136/70 61 18 97.5 °F (36.4 °C) 97 %      MAP       --         Physical Exam    Vitals reviewed.  Constitutional: She appears well-developed and well-nourished. She is not diaphoretic. No distress.   HENT:   Head: Normocephalic and atraumatic.   Eyes: Conjunctivae and EOM are normal.   Neck: Neck supple.   Cardiovascular:  Normal rate, regular rhythm, normal heart sounds and intact distal pulses.           Pulmonary/Chest: Breath sounds normal. No respiratory distress.   Musculoskeletal:         General: Normal range of motion.      Cervical back: Neck supple.      Lumbar back: Tenderness (bilateral lumbar, worse on left) present.     Neurological: She is alert and oriented to person, place, and time.   Skin: Skin is warm.         ED Course   Procedures  Labs Reviewed   CBC W/  AUTO DIFFERENTIAL - Abnormal; Notable for the following components:       Result Value    MCHC 30.8 (*)     Immature Granulocytes 0.7 (*)     Immature Grans (Abs) 0.07 (*)     All other components within normal limits   COMPREHENSIVE METABOLIC PANEL - Abnormal; Notable for the following components:    Potassium 3.2 (*)     All other components within normal limits   URINALYSIS, REFLEX TO URINE CULTURE - Abnormal; Notable for the following components:    Protein, UA 1+ (*)     Ketones, UA Trace (*)     Leukocytes, UA 1+ (*)     All other components within normal limits    Narrative:     Specimen Source->Urine   URINALYSIS MICROSCOPIC - Abnormal; Notable for the following components:    WBC, UA 7 (*)     All other components within normal limits    Narrative:     Specimen Source->Urine   HIV 1 / 2 ANTIBODY    Narrative:     Release to patient->Immediate   LIPASE          Imaging Results    None          Medications   potassium chloride SA CR tablet 40 mEq (40 mEq Oral Given 1/25/24 2102)     Medical Decision Making  Risk  Prescription drug management.         APC / Resident Notes:   Patient seen in the ER promptly upon arrival.  She is afebrile, no acute distress.  Physical examination reveals tenderness on palpation to the paraspinal muscle of the lumbar spine bilaterally, worse on the left.  No CVA tenderness on exam.  She states that this is acute on chronic for patient.  She does use a brace almost on a daily basis for her sciatica.  No paresthesia focal weakness to extremity otherwise. Patient not here for evaluation of her chronic back pain.            ED Course as of 01/25/24 2131   Thu Jan 25, 2024 2106 Lab work shows normal white count of 9.7.  Hemoglobin is stable.  Chemistries reveal potassium of 3.2.  Patient was given supplemental potassium in the ED.    Kidney functions are within normal limits.  Creatinine is 0.8, BUN 14 with GFR greater than 60. [AJ]   2106 LFTs within normal limits [AJ]   2121 Not  convincing of UTI at this time.  Dirty catch with 13 squamous cell.  WBC of 7 leukocytes 1+. [AJ]   2122 On reassessment patient is resting comfortably.  I informed patient of her normal lab finding as far as her kidney function.  She will follow-up with her primary care physician as needed.  She was however given strict return precautions ED which was agreeable to.  She is otherwise stable for discharge and close follow-up. [AJ]   2127 Disclaimer: This note has been generated using voice-recognition software. There may be typographical errors that have been missed during proof-reading.     [AJ]      ED Course User Index  [AJ] Elizabeth Villatoro PA-C                           Clinical Impression:  Final diagnoses:  [E87.6] Hypokalemia (Primary)          ED Disposition Condition    Discharge Stable          ED Prescriptions    None       Follow-up Information       Follow up With Specialties Details Why Contact Jonh Ortiz MD Internal Medicine   [address] [facility] [address]  [phone]               Elizabeth Villatoro PA-C  01/25/24 2131

## 2024-01-30 ENCOUNTER — TELEPHONE (OUTPATIENT)
Dept: OPHTHALMOLOGY | Facility: CLINIC | Age: 59
End: 2024-01-30
Payer: COMMERCIAL

## 2024-01-30 RX ORDER — PEN NEEDLE, DIABETIC 30 GX3/16"
NEEDLE, DISPOSABLE MISCELLANEOUS
Qty: 100 EACH | Refills: 0 | Status: CANCELLED | OUTPATIENT
Start: 2024-01-30

## 2024-02-08 ENCOUNTER — OFFICE VISIT (OUTPATIENT)
Dept: NEPHROLOGY | Facility: CLINIC | Age: 59
End: 2024-02-08
Payer: COMMERCIAL

## 2024-02-08 VITALS
DIASTOLIC BLOOD PRESSURE: 92 MMHG | OXYGEN SATURATION: 95 % | BODY MASS INDEX: 37.96 KG/M2 | HEIGHT: 61 IN | WEIGHT: 201.06 LBS | HEART RATE: 72 BPM | SYSTOLIC BLOOD PRESSURE: 142 MMHG

## 2024-02-08 DIAGNOSIS — N28.89 BILATERAL RENAL MASSES: ICD-10-CM

## 2024-02-08 DIAGNOSIS — N28.89 RENAL MASS: Primary | ICD-10-CM

## 2024-02-08 DIAGNOSIS — E11.9 CONTROLLED TYPE 2 DIABETES MELLITUS WITHOUT COMPLICATION, WITHOUT LONG-TERM CURRENT USE OF INSULIN: ICD-10-CM

## 2024-02-08 DIAGNOSIS — I10 PRIMARY HYPERTENSION: Chronic | ICD-10-CM

## 2024-02-08 PROCEDURE — 99204 OFFICE O/P NEW MOD 45 MIN: CPT | Mod: S$GLB,,, | Performed by: STUDENT IN AN ORGANIZED HEALTH CARE EDUCATION/TRAINING PROGRAM

## 2024-02-08 PROCEDURE — 1160F RVW MEDS BY RX/DR IN RCRD: CPT | Mod: CPTII,S$GLB,, | Performed by: STUDENT IN AN ORGANIZED HEALTH CARE EDUCATION/TRAINING PROGRAM

## 2024-02-08 PROCEDURE — 1159F MED LIST DOCD IN RCRD: CPT | Mod: CPTII,S$GLB,, | Performed by: STUDENT IN AN ORGANIZED HEALTH CARE EDUCATION/TRAINING PROGRAM

## 2024-02-08 PROCEDURE — 3080F DIAST BP >= 90 MM HG: CPT | Mod: CPTII,S$GLB,, | Performed by: STUDENT IN AN ORGANIZED HEALTH CARE EDUCATION/TRAINING PROGRAM

## 2024-02-08 PROCEDURE — 99999 PR PBB SHADOW E&M-EST. PATIENT-LVL V: CPT | Mod: PBBFAC,,, | Performed by: STUDENT IN AN ORGANIZED HEALTH CARE EDUCATION/TRAINING PROGRAM

## 2024-02-08 PROCEDURE — 3077F SYST BP >= 140 MM HG: CPT | Mod: CPTII,S$GLB,, | Performed by: STUDENT IN AN ORGANIZED HEALTH CARE EDUCATION/TRAINING PROGRAM

## 2024-02-08 PROCEDURE — 3066F NEPHROPATHY DOC TX: CPT | Mod: CPTII,S$GLB,, | Performed by: STUDENT IN AN ORGANIZED HEALTH CARE EDUCATION/TRAINING PROGRAM

## 2024-02-08 PROCEDURE — 3008F BODY MASS INDEX DOCD: CPT | Mod: CPTII,S$GLB,, | Performed by: STUDENT IN AN ORGANIZED HEALTH CARE EDUCATION/TRAINING PROGRAM

## 2024-02-08 PROCEDURE — 4010F ACE/ARB THERAPY RXD/TAKEN: CPT | Mod: CPTII,S$GLB,, | Performed by: STUDENT IN AN ORGANIZED HEALTH CARE EDUCATION/TRAINING PROGRAM

## 2024-02-08 NOTE — PROGRESS NOTES
Nephrology Clinic Note   2/8/2024    Chief Complaint   Patient presents with    Chronic Kidney Disease      History of present illness:  Patient is a 58 y.o. female.   Presents to the clinic today for medical conditions listed below.  Problem Noted   Bilateral Renal Masses 9/18/2023    At 80 and 50 hounsfield units clearly solid tissue lesions on both kidneys     Controlled Type 2 Diabetes Mellitus Without Complication, Without Long-Term Current Use of Insulin 4/14/2022   Htn (Hypertension) 4/14/2022     Review of Systems   Musculoskeletal:  Positive for back pain.       History:  Past Medical History:   Diagnosis Date    COPD (chronic obstructive pulmonary disease)     GERD (gastroesophageal reflux disease)     Hypertension     Seasonal allergies       Past Surgical History:   Procedure Laterality Date    bladder lift  1994    BREAST SURGERY      EXCISION OF LESION OF THYROID      EYE SURGERY Right 1968    HYSTERECTOMY      OPEN REDUCTION AND INTERNAL FIXATION (ORIF) OF FRACTURE OF OLECRANON PROCESS OF ULNA Right 9/30/2020    Procedure: ORIF, FRACTURE, OLECRANON;  Surgeon: Luis Davidson MD;  Location: [facility];  Service: Orthopedics;  Laterality: Right;  too instruments, k-wire and cable     C-ARM    R Breast Mass Removal Right 02/27/2020    benign    REDUCTION OF BOTH BREASTS      REMOVAL OF HARDWARE FROM UPPER EXTREMITY  05/10/2021    right arm. removal orthopedic hardware     TUBAL LIGATION          Current Outpatient Medications:     albuterol (PROVENTIL/VENTOLIN HFA) 90 mcg/actuation inhaler, Inhale 2 puffs into the lungs 2 (two) times daily., Disp: , Rfl:     aspirin (ECOTRIN) 81 MG EC tablet, Take 81 mg by mouth once daily., Disp: , Rfl:     atorvastatin (LIPITOR) 40 MG tablet, Take 1 tablet (40 mg total) by mouth once daily. Follow up with PCP before resuming medication, Disp: 90 tablet, Rfl: 3    blood sugar diagnostic Strp, One test strip use 1 times a day to check blood glucose,  ICD-10: E11.9,  compatible with insurance/glucometer, Disp: 50 each, Rfl: 11    blood-glucose meter kit, One glucometer, use to check 1 times a day.   ICD-10: E11.9,  Dispense machine covered by insurance, Disp: 1 each, Rfl: 0    brimonidine 0.2% (ALPHAGAN) 0.2 % Drop, Place 1 drop into both eyes every 12 (twelve) hours., Disp: 10 mL, Rfl: 4    cetirizine (ZYRTEC) 10 MG tablet, Take 1 tablet (10 mg total) by mouth once daily., Disp: 30 tablet, Rfl: 11    famotidine (PEPCID) 20 MG tablet, Take 1 tablet (20 mg total) by mouth 2 (two) times daily. HOLD until completed with antibiotics (resume 9/26/23), Disp: 60 tablet, Rfl: 11    insulin detemir U-100, Levemir, (LEVEMIR FLEXPEN) 100 unit/mL (3 mL) InPn pen, Inject 30 Units into the skin 2 (two) times daily., Disp: 54 mL, Rfl: 3    lancets Misc, One lancets use 1 times a day to check blood glucose, ICD-10: E11.9, Disp: 50 each, Rfl: 11    lancing device Misc, One device, used to check blood glucose, ICD-10: E11.9, Disp: 1 each, Rfl: 0    latanoprost 0.005 % ophthalmic solution, Place 1 drop into both eyes every evening., Disp: 2.5 mL, Rfl: 3    latanoprost 0.005 % ophthalmic solution, Place 1 drop into both eyes every evening., Disp: 2.5 mL, Rfl: 4    levothyroxine (SYNTHROID) 50 MCG tablet, Take 1 tablet (50 mcg total) by mouth before breakfast., Disp: 90 tablet, Rfl: 1    losartan-hydrochlorothiazide 100-25 mg (HYZAAR) 100-25 mg per tablet, Take 1 tablet by mouth once daily., Disp: , Rfl:     metFORMIN (GLUCOPHAGE-XR) 500 MG ER 24hr tablet, Take 2 tablets with breakfast and 1 tablet with supper., Disp: 270 tablet, Rfl: 1    metoprolol succinate (TOPROL-XL) 100 MG 24 hr tablet, Take 100 mg by mouth once daily., Disp: , Rfl:     metoprolol succinate (TOPROL-XL) 100 MG 24 hr tablet, Take 1 tablet (100 mg total) by mouth once daily., Disp: 90 tablet, Rfl: 0    NIFEdipine (PROCARDIA-XL) 60 MG (OSM) 24 hr tablet, Take 1 tablet (60 mg total) by mouth once daily., Disp: 30 tablet, Rfl: 11     "omega-3 fatty acids/fish oil (FISH OIL-OMEGA-3 FATTY ACIDS) 300-1,000 mg capsule, Take by mouth once daily., Disp: , Rfl:     pen needle, diabetic 31 gauge x 3/16" Ndle, Use to inject insulin into the skin., Disp: 100 each, Rfl: 0    pen needle, diabetic 31 gauge x 3/16" Ndle, use to inject insulin, Disp: 100 each, Rfl: 0    polyethylene glycol (GLYCOLAX) 17 gram/dose powder, Use cap to measure out 17 grams, mix with a liquid and take by mouth 2 (two) times daily., Disp: 476 g, Rfl: 0  Review of patient's allergies indicates:   Allergen Reactions    Vibramycin [doxycycline calcium] Hives    Codeine     Pcn [penicillins]     Sulfa (sulfonamide antibiotics)     Tetracyclines       Social History     Tobacco Use    Smoking status: Never    Smokeless tobacco: Never   Substance Use Topics    Alcohol use: No      No family history on file.     Physical Exam :  Vitals:    02/08/24 1052   BP: (!) 142/92   Pulse: 72     Physical Exam  Vitals and nursing note reviewed.   Constitutional:       General: She is not in acute distress.     Appearance: She is obese. She is ill-appearing.   Eyes:      General: No scleral icterus.  Cardiovascular:      Rate and Rhythm: Normal rate.   Pulmonary:      Effort: Pulmonary effort is normal. No respiratory distress.   Musculoskeletal:      Right lower leg: No edema.      Left lower leg: No edema.   Skin:     Coloration: Skin is not jaundiced.   Neurological:      Mental Status: She is alert.         The following labs reviewed   Lab Results   Component Value Date    HGB 12.2 01/25/2024     01/25/2024    K 3.2 (L) 01/25/2024    CREATININE 0.8 01/25/2024    PHOS 2.8 09/20/2023    PTH 48 03/28/2023        Assessment:    1. Renal mass    2. Primary hypertension    3. Bilateral renal masses    4. Controlled type 2 diabetes mellitus without complication, without long-term current use of insulin        Plan:    Bilateral renal masses  Ct with and w/o contrast and referral to " urology  Discussed with patient importance of follow up for monitoring  Discussed low risk of NANNETTE with her normal kidney function    HTN (hypertension)  Acceptable on losartan-hctz, metoprolol, and nifedipine  If she has hypokalemia issues in the future, would stop the hctz    Controlled type 2 diabetes mellitus without complication, without long-term current use of insulin  Discussed importance of diabetes control  Recommended she discuss with her diabetes doctor about GLP-1    Discussed her back pain low likelihood to be from kidneys, likely MSK. Discussed her currently normal kidney, but stressed that it could quickly change if she does not maintain adequate diabetes control. She already admits to vision changes.      Follow up if symptoms worsen or fail to improve.     I spent a total of 55 minutes on the day of the visit.      Orders Placed This Encounter   Procedures    CT Abdomen Pelvis W Wo Contrast    Ambulatory referral/consult to Urology     There are no discontinued medications.   Future Appointments   Date Time Provider Department Center   2/24/2024  8:15 AM [facility]CT1 500 LB LIMIT [facility]       Moises Page

## 2024-02-08 NOTE — ASSESSMENT & PLAN NOTE
Acceptable on losartan-hctz, metoprolol, and nifedipine  If she has hypokalemia issues in the future, would stop the hctz

## 2024-02-08 NOTE — ASSESSMENT & PLAN NOTE
Discussed importance of diabetes control  Recommended she discuss with her diabetes doctor about GLP-1

## 2024-02-08 NOTE — ASSESSMENT & PLAN NOTE
Ct with and w/o contrast and referral to urology  Discussed with patient importance of follow up for monitoring  Discussed low risk of NANNETTE with her normal kidney function

## 2024-02-14 ENCOUNTER — TELEPHONE (OUTPATIENT)
Dept: HEMATOLOGY/ONCOLOGY | Facility: CLINIC | Age: 59
End: 2024-02-14
Payer: COMMERCIAL

## 2024-02-14 NOTE — NURSING
Attempted to contact patient regarding urology referral, no answer. Detailed voicemail provided for return call.

## 2024-02-24 ENCOUNTER — HOSPITAL ENCOUNTER (OUTPATIENT)
Dept: RADIOLOGY | Facility: HOSPITAL | Age: 59
Discharge: HOME | End: 2024-02-24
Attending: STUDENT IN AN ORGANIZED HEALTH CARE EDUCATION/TRAINING PROGRAM
Payer: COMMERCIAL

## 2024-02-24 DIAGNOSIS — N28.89 RENAL MASS: ICD-10-CM

## 2024-02-24 PROCEDURE — 25500020 PHARM REV CODE 255: Performed by: STUDENT IN AN ORGANIZED HEALTH CARE EDUCATION/TRAINING PROGRAM

## 2024-02-24 PROCEDURE — 74178 CT ABD&PLV WO CNTR FLWD CNTR: CPT | Mod: TC

## 2024-02-24 PROCEDURE — 74178 CT ABD&PLV WO CNTR FLWD CNTR: CPT | Mod: 26,,, | Performed by: RADIOLOGY

## 2024-02-24 RX ADMIN — IOHEXOL 100 ML: 350 INJECTION, SOLUTION INTRAVENOUS at 09:02

## 2024-03-04 ENCOUNTER — OFFICE VISIT (OUTPATIENT)
Dept: UROLOGY | Facility: CLINIC | Age: 59
End: 2024-03-04
Payer: COMMERCIAL

## 2024-03-04 VITALS
HEART RATE: 78 BPM | BODY MASS INDEX: 37.96 KG/M2 | WEIGHT: 201.06 LBS | HEIGHT: 61 IN | SYSTOLIC BLOOD PRESSURE: 140 MMHG | DIASTOLIC BLOOD PRESSURE: 87 MMHG

## 2024-03-04 DIAGNOSIS — N28.89 BILATERAL RENAL MASSES: Primary | ICD-10-CM

## 2024-03-04 PROCEDURE — 3079F DIAST BP 80-89 MM HG: CPT | Mod: CPTII,S$GLB,, | Performed by: UROLOGY

## 2024-03-04 PROCEDURE — 3066F NEPHROPATHY DOC TX: CPT | Mod: CPTII,S$GLB,, | Performed by: UROLOGY

## 2024-03-04 PROCEDURE — 1159F MED LIST DOCD IN RCRD: CPT | Mod: CPTII,S$GLB,, | Performed by: UROLOGY

## 2024-03-04 PROCEDURE — 4010F ACE/ARB THERAPY RXD/TAKEN: CPT | Mod: CPTII,S$GLB,, | Performed by: UROLOGY

## 2024-03-04 PROCEDURE — 3077F SYST BP >= 140 MM HG: CPT | Mod: CPTII,S$GLB,, | Performed by: UROLOGY

## 2024-03-04 PROCEDURE — 99999 PR PBB SHADOW E&M-EST. PATIENT-LVL IV: CPT | Mod: PBBFAC,,, | Performed by: UROLOGY

## 2024-03-04 PROCEDURE — 99204 OFFICE O/P NEW MOD 45 MIN: CPT | Mod: S$GLB,,, | Performed by: UROLOGY

## 2024-03-04 PROCEDURE — 3008F BODY MASS INDEX DOCD: CPT | Mod: CPTII,S$GLB,, | Performed by: UROLOGY

## 2024-03-04 NOTE — PROGRESS NOTES
[facility]  Urologic Oncology      Date of Service: 03/04/2024    Chief Complaint/Reason for Consultation: bilateral Bosniak 2 renal cysts     Requesting Provider:   Moises Page Jr., MD  [address] [place] [address]      History of Present Illness:   Patient is a 58 y.o. female presenting for evaluation of bilateral renal cysts.  She has a history of COPD, HF, poorly controlled T2D c/b DKA (A1c 14).       CTAP 2/24/24 w/ and w/o shows 3.8 R midpole lesion (36 HU pre and 42 HU post contrast).  1.7 cm L lower pole lesion 74 HU pre and 76 HU post contrast.  1.3 cm L mid- lower pole lesion 30 HU pre and 31 HU post contrast.  All consistent with Bosniak II and IIF lesions.      Denies gross hematuria, dysuria.  Denies flank pain.  Does endorse lower back pain radiating down the left leg more consistent with sciatica.  Denies FH of kidney, bladder cancer.       Imaging: I have reviewed the imaging study CT of the abdomen and pelvis with and without contrast on 02/24/2024 personally, and agree with the findings and interpretation.         Current Problem List:  Patient Active Problem List    Diagnosis Date Noted    Glaucoma 09/21/2023    Bilateral renal masses 09/18/2023    Transaminitis 09/18/2023    Elevated brain natriuretic peptide (BNP) level 09/16/2023    Diverticulitis 09/16/2023    Atelectasis 10/06/2022    Hyperlipidemia 10/06/2022    GERD (gastroesophageal reflux disease) 10/06/2022    COPD (chronic obstructive pulmonary disease) 10/06/2022    Acute congestive heart failure 10/06/2022    Fracture of humeral shaft, right, closed 06/30/2022    Postoperative hypothyroidism 06/03/2022    Goiter 04/14/2022    Controlled type 2 diabetes mellitus without complication, without long-term current use of insulin 04/14/2022    HTN (hypertension) 04/14/2022    Pre-op testing 05/10/2021    Hypertrophy of breast 08/25/2014        Allergies:  Review of patient's allergies indicates:   Allergen  Reactions    Vibramycin [doxycycline calcium] Hives    Codeine     Pcn [penicillins]     Sulfa (sulfonamide antibiotics)     Tetracyclines         Medications per EMR:  (Not in a hospital admission)      Past Medical History:  Past Medical History:   Diagnosis Date    COPD (chronic obstructive pulmonary disease)     GERD (gastroesophageal reflux disease)     Hypertension     Seasonal allergies         Past Surgical History:  Past Surgical History:   Procedure Laterality Date    bladder lift  1994    BREAST SURGERY      EXCISION OF LESION OF THYROID      EYE SURGERY Right 1968    HYSTERECTOMY      OPEN REDUCTION AND INTERNAL FIXATION (ORIF) OF FRACTURE OF OLECRANON PROCESS OF ULNA Right 9/30/2020    Procedure: ORIF, FRACTURE, OLECRANON;  Surgeon: Luis Davidson MD;  Location: [facility];  Service: Orthopedics;  Laterality: Right;  too instruments, k-wire and cable     C-ARM    R Breast Mass Removal Right 02/27/2020    benign    REDUCTION OF BOTH BREASTS      REMOVAL OF HARDWARE FROM UPPER EXTREMITY  05/10/2021    right arm. removal orthopedic hardware     TUBAL LIGATION          Family History:  No family history on file.     Social History:  Social History     Tobacco Use    Smoking status: Never    Smokeless tobacco: Never   Substance Use Topics    Alcohol use: No          OBJECTIVE:     There were no vitals filed for this visit.       General Appearance: Alert, cooperative, no distress  Head: Normocephalic  Eyes: Clear conjunctiva  Neck: No obvious LND or JVD  Lungs: Normal chest excursion, no accessory muscle use  Chest: Regular rate rhythm by palpation, no pedal edema  Extremities: Atraumatic   Lymph Nodes: No appreciable lymph adenopathy  Neurologic: No gross gait, motor or sensory deficits        LAB:    CBC:  Lab Results   Component Value Date    WBC 9.71 01/25/2024    HGB 12.2 01/25/2024    HCT 39.6 01/25/2024    MCV 89 01/25/2024     01/25/2024         BMP:  Lab Results   Component Value Date      01/25/2024    K 3.2 (L) 01/25/2024     01/25/2024    CO2 28 01/25/2024    BUN 14 01/25/2024    CREATININE 0.8 01/25/2024    CALCIUM 9.9 01/25/2024    ANIONGAP 9 01/25/2024    EGFRNORACEVR >60.0 01/25/2024           IMAGING:      CTAP 2/24/24 w/ and w/o shows 3.8 R midpole lesion (36 HU pre and 42 HU post contrast).  1.7 cm L lower pole lesion 74 HU pre and 76 HU post contrast.  1.3 cm L mid- lower pole lesion 30 HU pre and 31 HU post contrast.  All consistent with Bosniak II and IIF lesions.          ASSESSMENT/PLAN:     Assessment:    58F with Bosniak II/IIF renal cysts     Plan:    - RTC 6 months with renal US     Discussed complexity and grading of renal cysts termed the Bosniak classification. Explained that per my read the patient exhibited a grade II-IIF classification.     Bosniak 1 - simple cyst with: imperceptible wall, round characterization. No further workup is needed as the likelihood of malignancy is ~0%.  Bosniak 2 - minimally complex with: a few thin <1mm septa or thin calcifications (thickness not measurable); non-enhancing high attenuation (due to proteinaceous or hemorrhagic contents); renal lesions of <3cm are also included; well marginated. No further workup is needed as the likelihood of malignancy is ~0%.  Bosniak 2F - minimally complex with: an increased number of septa, minimally thickened with nodular or thick calcifications; enhancement of hairlin-thin smooth septa; hyperdense cyst >3cm in diameter, mostly intrarenal with less than 25% of wall visible; no enhancement. This type of lesion requires additional followup with Ultrasound/CT in approximately 6 months. The likelihood of malignancy is ~5%.  Bosniak 3 - indeterminate with: thick, nodular multiple septa or wall with measurable enhancement, hyperdense on CT. Due to the higher likelihood of malignancy of ~55%, typically treatment is recommended: partial nephrectomy or radiofrequency ablation.   Bosniak 4 - clearly  malignancy with: solid mass with a large cystic or necrotic component. Due to the very high likelihood of malignancy nearing ~80% or greater the recommendation for treatment include a partial or a radical nephrectomy.     I spent a total of 45 minutes on the day of the visit.This includes face to face time and non-face to face time preparing to see the patient (eg, review of tests), obtaining and/or reviewing separately obtained history, documenting clinical information in the electronic or other health record, independently interpreting results and communicating results to the patient/family/caregiver, or care coordinator.

## 2024-03-20 ENCOUNTER — PATIENT MESSAGE (OUTPATIENT)
Dept: ENDOSCOPY | Facility: HOSPITAL | Age: 59
End: 2024-03-20
Payer: COMMERCIAL

## 2024-03-20 ENCOUNTER — TELEPHONE (OUTPATIENT)
Dept: ENDOSCOPY | Facility: HOSPITAL | Age: 59
End: 2024-03-20
Payer: COMMERCIAL

## 2024-05-15 NOTE — DISCHARGE INSTRUCTIONS
Your kidney function is normal today. Stay hydrated. Have potassium rich diet as your potassium was slightly low today. Continue following up with your doctor. Return to the ED with concerning or worsening symptoms.      no known allergies

## 2024-06-18 NOTE — PROCEDURES
"Olga Lopez is a 56 y.o. female patient.    Temp: 98.1 °F (36.7 °C) (22)  Pulse: 100 (22)  Resp: 20 (22)  BP: (!) 186/116 (22)  SpO2: 98 % (22)  Weight: 84.4 kg (186 lb) (22)  Height: 5' 1" (154.9 cm) (22)       Orthopedic Injury Treatment    Date/Time: 2022 4:30 AM  Performed by: Vel Hernandez MD  Authorized by: DO Paris Victor procedure was performed:  [facility] EMERGENCY DEPARTMENT  Consent Done?:  Yes  Universal Protocol:     Verbal consent obtained?: Yes      Consent given by:  Patient    Patient identity confirmed:  , verbally with patient, MRN and name  Injury:     Injury location:  Upper arm    Location details:  Right upper arm    Injury type:  Fracture    Fracture type: humeral shaft        Pre-procedure assessment:     Neurovascular status: Neurovascularly intact        Selections made in this section will also lock the Injury type section above.:     Manipulation performed?: Yes      Confirmation: Reduction confirmed by x-ray      Immobilization:  Splint    Splint type: Coaptation     Complications: No      Specimens: No      Implants: No    Post-procedure assessment:     Distal perfusion: normal      Neurological function: normal      Range of motion: splinted      Patient tolerance:  Patient tolerated the procedure well with no immediate complications       Procedure Note: Right humerus closed reduction  Patient was explained risks, benefits, and alternatives to treatment and verbalized consent to proceed. Time out was performed and patient name, , site, and procedure were confirmed. IV ativan 1mg was used for pain control. Splint was applied and post-reduction films were performed and confirmed adequate reduction. Patient tolerated the procedure well.           2022  "
Opt out